# Patient Record
Sex: MALE | Race: WHITE | NOT HISPANIC OR LATINO | Employment: OTHER | URBAN - METROPOLITAN AREA
[De-identification: names, ages, dates, MRNs, and addresses within clinical notes are randomized per-mention and may not be internally consistent; named-entity substitution may affect disease eponyms.]

---

## 2017-02-03 DIAGNOSIS — B20 HUMAN IMMUNODEFICIENCY VIRUS (HIV) DISEASE (H): ICD-10-CM

## 2017-02-03 LAB
ALBUMIN SERPL-MCNC: 3.9 G/DL (ref 3.4–5)
ALP SERPL-CCNC: 77 U/L (ref 40–150)
ALT SERPL W P-5'-P-CCNC: 24 U/L (ref 0–70)
ANION GAP SERPL CALCULATED.3IONS-SCNC: 9 MMOL/L (ref 3–14)
AST SERPL W P-5'-P-CCNC: 25 U/L (ref 0–45)
BASOPHILS # BLD AUTO: 0 10E9/L (ref 0–0.2)
BASOPHILS NFR BLD AUTO: 0.3 %
BILIRUB SERPL-MCNC: 0.7 MG/DL (ref 0.2–1.3)
BUN SERPL-MCNC: 10 MG/DL (ref 7–30)
CALCIUM SERPL-MCNC: 8.5 MG/DL (ref 8.5–10.1)
CD3 CELLS # BLD: 1789 CELLS/UL (ref 603–2990)
CD3 CELLS NFR BLD: 73 % (ref 49–84)
CD3+CD4+ CELLS # BLD: 801 CELLS/UL (ref 441–2156)
CD3+CD4+ CELLS NFR BLD: 33 % (ref 28–63)
CD3+CD4+ CELLS/CD3+CD8+ CLL BLD: 0.85 % (ref 1.4–2.6)
CD3+CD8+ CELLS # BLD: 956 CELLS/UL (ref 125–1312)
CD3+CD8+ CELLS NFR BLD: 39 % (ref 10–40)
CHLORIDE SERPL-SCNC: 100 MMOL/L (ref 94–109)
CHOLEST SERPL-MCNC: 122 MG/DL
CO2 SERPL-SCNC: 28 MMOL/L (ref 20–32)
CREAT SERPL-MCNC: 0.79 MG/DL (ref 0.66–1.25)
DIFFERENTIAL METHOD BLD: NORMAL
EOSINOPHIL # BLD AUTO: 0.1 10E9/L (ref 0–0.7)
EOSINOPHIL NFR BLD AUTO: 0.9 %
ERYTHROCYTE [DISTWIDTH] IN BLOOD BY AUTOMATED COUNT: 12 % (ref 10–15)
GFR SERPL CREATININE-BSD FRML MDRD: NORMAL ML/MIN/1.7M2
GLUCOSE SERPL-MCNC: 85 MG/DL (ref 70–99)
HCT VFR BLD AUTO: 48.1 % (ref 40–53)
HDLC SERPL-MCNC: 52 MG/DL
HGB BLD-MCNC: 17 G/DL (ref 13.3–17.7)
IFC SPECIMEN: ABNORMAL
IMM GRANULOCYTES # BLD: 0 10E9/L (ref 0–0.4)
IMM GRANULOCYTES NFR BLD: 0.2 %
IMMUNODEFICIENCY MARKERS SPEC-IMP: ABNORMAL
LDLC SERPL CALC-MCNC: 59 MG/DL
LYMPHOCYTES # BLD AUTO: 2.2 10E9/L (ref 0.8–5.3)
LYMPHOCYTES NFR BLD AUTO: 32.9 %
MCH RBC QN AUTO: 32.9 PG (ref 26.5–33)
MCHC RBC AUTO-ENTMCNC: 35.3 G/DL (ref 31.5–36.5)
MCV RBC AUTO: 93 FL (ref 78–100)
MONOCYTES # BLD AUTO: 0.5 10E9/L (ref 0–1.3)
MONOCYTES NFR BLD AUTO: 8 %
NEUTROPHILS # BLD AUTO: 3.8 10E9/L (ref 1.6–8.3)
NEUTROPHILS NFR BLD AUTO: 57.7 %
NONHDLC SERPL-MCNC: 70 MG/DL
NRBC # BLD AUTO: 0 10*3/UL
NRBC BLD AUTO-RTO: 0 /100
PLATELET # BLD AUTO: 271 10E9/L (ref 150–450)
POTASSIUM SERPL-SCNC: 4 MMOL/L (ref 3.4–5.3)
PROT SERPL-MCNC: 6.9 G/DL (ref 6.8–8.8)
RBC # BLD AUTO: 5.16 10E12/L (ref 4.4–5.9)
SODIUM SERPL-SCNC: 137 MMOL/L (ref 133–144)
TRIGL SERPL-MCNC: 52 MG/DL
WBC # BLD AUTO: 6.6 10E9/L (ref 4–11)

## 2017-02-04 LAB
HIV1 RNA # PLAS NAA DL=20: 30 {COPIES}/ML
HIV1 RNA SERPL NAA+PROBE-LOG#: 1.5 {LOG_COPIES}/ML

## 2017-02-16 ENCOUNTER — TELEPHONE (OUTPATIENT)
Dept: INFECTIOUS DISEASES | Facility: CLINIC | Age: 67
End: 2017-02-16

## 2017-02-17 ENCOUNTER — OFFICE VISIT (OUTPATIENT)
Dept: INFECTIOUS DISEASES | Facility: CLINIC | Age: 67
End: 2017-02-17
Attending: INTERNAL MEDICINE
Payer: COMMERCIAL

## 2017-02-17 VITALS
HEART RATE: 58 BPM | SYSTOLIC BLOOD PRESSURE: 181 MMHG | DIASTOLIC BLOOD PRESSURE: 97 MMHG | WEIGHT: 199.8 LBS | BODY MASS INDEX: 28.6 KG/M2 | HEIGHT: 70 IN | TEMPERATURE: 97.5 F

## 2017-02-17 DIAGNOSIS — B20 HUMAN IMMUNODEFICIENCY VIRUS (HIV) DISEASE (H): Primary | ICD-10-CM

## 2017-02-17 PROCEDURE — 99212 OFFICE O/P EST SF 10 MIN: CPT | Mod: ZF

## 2017-02-17 PROCEDURE — 36415 COLL VENOUS BLD VENIPUNCTURE: CPT | Performed by: INTERNAL MEDICINE

## 2017-02-17 PROCEDURE — 87536 HIV-1 QUANT&REVRSE TRNSCRPJ: CPT | Performed by: INTERNAL MEDICINE

## 2017-02-17 ASSESSMENT — PAIN SCALES - GENERAL: PAINLEVEL: NO PAIN (0)

## 2017-02-17 NOTE — PROGRESS NOTES
Infectious Disease Clinic  HIV Follow Up Visit    Chief Complaint:  RECHECK (F/U B20)    HPI:  Zac Rosenthal is a 66 year old male who is here for follow-up on HIV treatment.  He is currently taking Stribild once a day without missing doses and no side effects.  Labs were draw 2 weeks prior and viral load was 30 copies/mL and CD4 count was 801.  Patient was concerned about viral load result as he had been consistently undetectable previously.  Reassured patient that this result was probably a blip.     [facility].  Most recently, he had a high resolution anoscopy in August and pathology revealed low grade dysplasia AIN1.  He is due for a followup this month.       ROS:   Eyes: negative, visual blurring, double vision  Ears/Nose/Throat: negative, hearing loss, vertigo  Respiratory: No shortness of breath, dyspnea on exertion, cough, or hemoptysis  Cardiovascular: negative, tachycardia, irregular heart beat, chest pain and exertional chest pain or pressure  Gastrointestinal: negative, poor appetite, nausea, vomiting, abdominal pain, constipation and diarrhea  Genitourinary: negative, frequency, urgency and hematuria  Musculoskeletal: negative, joint pain and joint swelling  Neurologic: negative, headaches, syncope, numbness or tingling of hands and numbness or tingling of feet  Hematologic/Lymphatic/Immunologic: negative, chills and fever      Medications:  Current Outpatient Prescriptions   Medication Sig Dispense Refill     STRIBILD [id number] mg tablet Take 1 tablet by mouth daily (with breakfast) Dispense only in original container. 31 tablet 11     fluconazole (DIFLUCAN) 200 MG tablet Take 1 tablet (200 mg) by mouth daily 14 tablet 11     ketoconazole (NIZORAL) 2 % cream Apply topically daily 15 g 11       Exam:  B/P: 181/97, T: 97.5, P: 58, R: Data Unavailable, Weight:   Wt Readings from Last 2 Encounters:   02/17/17 90.6 kg  (199 lb 12.8 oz)   08/18/16 89.2 kg (196 lb 11.2 oz)     Later remeasured in the exam room to be 145/75.     Physical Exam   Constitutional: He is well-developed, well-nourished, and in no distress.   Cardiovascular: Normal rate and regular rhythm.    Pulmonary/Chest: Breath sounds normal.   Abdominal: Soft. Bowel sounds are normal.   Lymphadenopathy:     He has no cervical adenopathy.   Neurological: He is alert.   Psychiatric: Affect and judgment normal.         Assessment and Plan:  Zac Rosenthal is a 66 year old male who is here for followup on HIV treatment, currently doing well on Stribild once a day.  Will redo viral load today to assure patient's concerns about last count of 30.  If undetectable, will followup in 6 months.  If roughly the same, will see patient in 3 months.  If above 100, will repeat again and schedule appt immediately to discuss change in medication regimen.        Attending:    This note as edited by me reflects my history, physical, assessment and plan.    Victor Hugo Dacosta MD  Professor of Medicine

## 2017-02-17 NOTE — MR AVS SNAPSHOT
After Visit Summary   2/17/2017    Zac Rosenthal    MRN: [id number]           Patient Information     Date Of Birth          1950        Visit Information        Provider Department      2/17/2017 8:30 AM Victor Hugo Dacosta MD [facility] and Infectious Diseases        Today's Diagnoses     Human immunodeficiency virus (HIV) disease (H)    -  1       Follow-ups after your visit        Follow-up notes from your care team     Return in about 6 months (around 8/17/2017).      Your next 10 appointments already scheduled     Aug 18, 2017  8:30 AM CDT   (Arrive by 8:15 AM)   Return Visit with Victor Hugo Dacosta MD   [facility] and Infectious Diseases ([facility])    [address]  3rd Floor  [place] [address] [phone]              Who to contact     If you have questions or need follow up information about today's clinic visit or your schedule please contact [facility] AND INFECTIOUS DISEASES directly at [phone].  Normal or non-critical lab and imaging results will be communicated to you by [employer], letter or phone within 4 business days after the clinic has received the results. If you do not hear from us within 7 days, please contact the clinic through [employer] or phone. If you have a critical or abnormal lab result, we will notify you by phone as soon as possible.  Submit refill requests through [employer] or call your pharmacy and they will forward the refill request to us. Please allow 3 business days for your refill to be completed.          Additional Information About Your Visit        [employer] Information     [employer] gives you secure access to your electronic health record. If you see a primary care provider, you can also send messages to your care team and make appointments. If you have questions, please call your primary care clinic.  If you do not have a primary care provider, please call  "[phone] and they will assist you.        Care EveryWhere ID     This is your Care EveryWhere ID. This could be used by other organizations to access your [place] medical records  [id number]        Your Vitals Were     Pulse Temperature Height BMI (Body Mass Index)          58 97.5  F (36.4  C) (Oral) 1.778 m (5' 10\") 28.67 kg/m2         Blood Pressure from Last 3 Encounters:   02/17/17 (!) 181/97   08/18/16 143/87   08/12/16 155/84    Weight from Last 3 Encounters:   02/17/17 90.6 kg (199 lb 12.8 oz)   08/18/16 89.2 kg (196 lb 11.2 oz)   08/12/16 88.7 kg (195 lb 9.6 oz)              We Performed the Following     HIV-1 RNA quantitative        Primary Care Provider    None Doctor, MD       No address on file        Thank you!     Thank you for choosing [facility] AND INFECTIOUS DISEASES  for your care. Our goal is always to provide you with excellent care. Hearing back from our patients is one way we can continue to improve our services. Please take a few minutes to complete the written survey that you may receive in the mail after your visit with us. Thank you!             Your Updated Medication List - Protect others around you: Learn how to safely use, store and throw away your medicines at www.disposemymeds.org.          This list is accurate as of: 2/17/17 11:59 PM.  Always use your most recent med list.                   Brand Name Dispense Instructions for use    Gupjbyi-Rylnc-Imjrilpd-TenofDF [id number] MG per tablet    STRIBILD    31 tablet    Take 1 tablet by mouth daily (with breakfast) Dispense only in original container.       fluconazole 200 MG tablet    DIFLUCAN    14 tablet    Take 1 tablet (200 mg) by mouth daily       ketoconazole 2 % cream    NIZORAL    15 g    Apply topically daily         "

## 2017-02-17 NOTE — PROGRESS NOTES
Answers for HPI/ROS submitted by the patient on 2/6/2017   General Symptoms: No  Skin Symptoms: No  HENT Symptoms: No  EYE SYMPTOMS: No  HEART SYMPTOMS: No  LUNG SYMPTOMS: No  INTESTINAL SYMPTOMS: No  URINARY SYMPTOMS: No  REPRODUCTIVE SYMPTOMS: No  SKELETAL SYMPTOMS: No  BLOOD SYMPTOMS: No  NERVOUS SYSTEM SYMPTOMS: No  MENTAL HEALTH SYMPTOMS: No

## 2017-02-17 NOTE — LETTER
2/17/2017       RE: Zac Rosenthal  [address]   [place] [facility] [address]     Dear Colleague,    Thank you for referring your patient, Zac Rosenthal, to the [facility] AND INFECTIOUS DISEASES at [facility]. Please see a copy of my visit note below.    Infectious Disease Clinic  HIV Follow Up Visit    Chief Complaint:  RECHECK (F/U B20)    HPI:  Zac Rosenthal is a 66 year old male who is here for follow-up on HIV treatment.  He is currently taking Stribild once a day without missing doses and no side effects.  Labs were draw 2 weeks prior and viral load was 30 copies/mL and CD4 count was 801.  Patient was concerned about viral load result as he had been consistently undetectable previously.  Reassured patient that this result was probably a blip.     [facility] is [facility].  Most recently, he had a high resolution anoscopy in August and pathology revealed low grade dysplasia AIN1.  He is due for a followup this month.       ROS:   Eyes: negative, visual blurring, double vision  Ears/Nose/Throat: negative, hearing loss, vertigo  Respiratory: No shortness of breath, dyspnea on exertion, cough, or hemoptysis  Cardiovascular: negative, tachycardia, irregular heart beat, chest pain and exertional chest pain or pressure  Gastrointestinal: negative, poor appetite, nausea, vomiting, abdominal pain, constipation and diarrhea  Genitourinary: negative, frequency, urgency and hematuria  Musculoskeletal: negative, joint pain and joint swelling  Neurologic: negative, headaches, syncope, numbness or tingling of hands and numbness or tingling of feet  Hematologic/Lymphatic/Immunologic: negative, chills and fever      Medications:  Current Outpatient Prescriptions   Medication Sig Dispense Refill     STRIBILD [id number] mg tablet Take 1 tablet by mouth daily (with breakfast) Dispense only in  original container. 31 tablet 11     fluconazole (DIFLUCAN) 200 MG tablet Take 1 tablet (200 mg) by mouth daily 14 tablet 11     ketoconazole (NIZORAL) 2 % cream Apply topically daily 15 g 11       Exam:  B/P: 181/97, T: 97.5, P: 58, R: Data Unavailable, Weight:   Wt Readings from Last 2 Encounters:   02/17/17 90.6 kg (199 lb 12.8 oz)   08/18/16 89.2 kg (196 lb 11.2 oz)     Later remeasured in the exam room to be 145/75.     Physical Exam   Constitutional: He is well-developed, well-nourished, and in no distress.   Cardiovascular: Normal rate and regular rhythm.    Pulmonary/Chest: Breath sounds normal.   Abdominal: Soft. Bowel sounds are normal.   Lymphadenopathy:     He has no cervical adenopathy.   Neurological: He is alert.   Psychiatric: Affect and judgment normal.         Assessment and Plan:  Zac Rosenthal is a 66 year old male who is here for followup on HIV treatment, currently doing well on Stribild once a day.  Will redo viral load today to assure patient's concerns about last count of 30.  If undetectable, will followup in 6 months.  If roughly the same, will see patient in 3 months.  If above 100, will repeat again and schedule appt immediately to discuss change in medication regimen.        Attending:    This note as edited by me reflects my history, physical, assessment and plan.    Victor Hugo Dacosta MD  Professor of Medicine      Answers for HPI/ROS submitted by the patient on 2/6/2017   General Symptoms: No  Skin Symptoms: No  HENT Symptoms: No  EYE SYMPTOMS: No  HEART SYMPTOMS: No  LUNG SYMPTOMS: No  INTESTINAL SYMPTOMS: No  URINARY SYMPTOMS: No  REPRODUCTIVE SYMPTOMS: No  SKELETAL SYMPTOMS: No  BLOOD SYMPTOMS: No  NERVOUS SYSTEM SYMPTOMS: No  MENTAL HEALTH SYMPTOMS: No      Again, thank you for allowing me to participate in the care of your patient.      Sincerely,    Victor Hugo Dacosta MD

## 2017-02-17 NOTE — LETTER
Date:February 20, 2017      Patient was self referred, no letter generated. Do not send.        [facility] Health Information

## 2017-02-17 NOTE — NURSING NOTE
"Chief Complaint   Patient presents with     RECHECK     F/U B20       Initial BP (!) 181/97 (Cuff Size: Adult Regular)  Pulse 58  Temp 97.5  F (36.4  C) (Oral)  Ht 1.778 m (5' 10\")  Wt 90.6 kg (199 lb 12.8 oz)  BMI 28.67 kg/m2 Estimated body mass index is 28.67 kg/(m^2) as calculated from the following:    Height as of this encounter: 1.778 m (5' 10\").    Weight as of this encounter: 90.6 kg (199 lb 12.8 oz).  Medication Reconciliation: complete  "

## 2017-02-21 LAB
HIV1 RNA # PLAS NAA DL=20: NORMAL {COPIES}/ML
HIV1 RNA SERPL NAA+PROBE-LOG#: NORMAL {LOG_COPIES}/ML

## 2017-02-24 ENCOUNTER — TELEPHONE (OUTPATIENT)
Dept: INFECTIOUS DISEASES | Facility: CLINIC | Age: 67
End: 2017-02-24

## 2017-02-24 NOTE — TELEPHONE ENCOUNTER
Dr. Dacosta requested that nurse call pt and let him know HIV viral load from 2/17/17 was undetectable. Spoke with pt on the phone and notified him.  Lisa Kelly RN

## 2017-04-13 ENCOUNTER — OFFICE VISIT (OUTPATIENT)
Dept: SURGERY | Facility: CLINIC | Age: 67
End: 2017-04-13

## 2017-04-13 VITALS
HEART RATE: 59 BPM | OXYGEN SATURATION: 100 % | WEIGHT: 206 LBS | HEIGHT: 70 IN | SYSTOLIC BLOOD PRESSURE: 147 MMHG | TEMPERATURE: 98.4 F | DIASTOLIC BLOOD PRESSURE: 84 MMHG | BODY MASS INDEX: 29.49 KG/M2

## 2017-04-13 DIAGNOSIS — K62.82 ANAL DYSPLASIA: Primary | ICD-10-CM

## 2017-04-13 ASSESSMENT — PAIN SCALES - GENERAL: PAINLEVEL: NO PAIN (0)

## 2017-04-13 NOTE — NURSING NOTE
"Chief Complaint   Patient presents with     Clinic Care Coordination - Follow-up     HRA procedure today.        Vitals:    04/13/17 1246   BP: 147/84   BP Location: Left arm   Patient Position: Chair   Cuff Size: Adult Regular   Pulse: 59   Temp: 98.4  F (36.9  C)   TempSrc: Oral   SpO2: 100%   Weight: 206 lb   Height: 5' 10\"       Body mass index is 29.56 kg/(m^2).    Sarai MITCHELL LPN                       "

## 2017-04-13 NOTE — LETTER
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 He was last seen in our clinic for HRA on 2017 with biopsies at that time showing low grade dysplasia. Anal cytology on 16 was negative. He presents today for high resolution anoscopy.     HPI: Zac Rosenthal is not a smoker. His HIV is under good control. He has no anorectal complaints and no other concerns today.   His last colonoscopy was in  with a repeat recommended this year.    Answers for HPI/ROS submitted by the patient on 2016   General Symptoms: No  Skin Symptoms: No  HENT Symptoms: No  EYE SYMPTOMS: No  HEART SYMPTOMS: No  LUNG SYMPTOMS: No  INTESTINAL SYMPTOMS: No  URINARY SYMPTOMS: No  REPRODUCTIVE SYMPTOMS: No  SKELETAL SYMPTOMS: No  BLOOD SYMPTOMS: No  NERVOUS SYSTEM SYMPTOMS: No  MENTAL HEALTH SYMPTOMS: No      ASSESSMENT: Written, informed consent was obtained prior to procedure.  Prior to the start of the procedure and with procedural staff participation, I verbally confirmed the patient s identity using two indicators, relevant allergies, that the procedure was appropriate and matched the consent or emergent situation, and that the correct equipment/implants were available. Immediately prior to starting the procedure I conducted the Time Out with the procedural staff and re-confirmed the patient s name, procedure, and site/side. (The Joint Commission universal protocol was followed.)  Yes    Sedation (Moderate or Deep): None    Anal cytology was not obtained today. Digital anal rectal exam was performed without any palpable lesions. Dilute  acetic acid soak was completed for 2 minutes. Lubricant was used to insert the anoscope. Performed high resolution microscopy using the colposcope. The dentate line was viewed in its entirety. Abnormal areas noted were a distinct area of the bright acetowhitening with punctation in the left anterior position at the dentate line and a larger area in the posterior anal canal at the anal verge extending to the perianal skin. No verruciform lesions. After injection with 1% lidocaine with epinephrine biopsies were obtained the left anterior position at the dentate line and in the posterior position in the distal anal canal using a baby Tischler forceps. We discussed that if this represents high grade dysplasia that I would recommend fulguration in the OR. The patient requested to have fulguration in clinic today rather than having to return. Both sites were fulgurated, the base was curetted out, and re fulguration was performed. He tolerated this well. Hemostasis was obtained using Monsel solution at the internal site as this was overlying a hemorrhoid. He tolerated this well.  The perianal area was inspected after acetic acid soak. No additional lesions, acetowhitening, or abnormalities noted.    PLAN: We will follow-up with him with results of the biopsies from today. If this is high-grade dysplasia, it was destroyed completely today and I would recommend follow up for HRA in clinic in 3 months. If low grade dysplasia or normal, repeat HRA in 6 months.   Patient's questions were answered to his stated satisfaction and he is in agreement with this plan.    For details of past medical history, surgical history, family history, medications, allergies, and review of systems, please see details below.    Medical history:  No past medical history on file.    Surgical history:  No past surgical history on file.    Family history:  No family history on file.    Medications:  Current Outpatient Prescriptions   Medication Sig  "Dispense Refill     STRIBILD [id number] mg tablet Take 1 tablet by mouth daily (with breakfast) Dispense only in original container. 31 tablet 11     fluconazole (DIFLUCAN) 200 MG tablet Take 1 tablet (200 mg) by mouth daily 14 tablet 11     ketoconazole (NIZORAL) 2 % cream Apply topically daily 15 g 11     Allergies:  The patientis allergic to amoxicillin.    Social history:  Social History   Substance Use Topics     Smoking status: Never Smoker     Smokeless tobacco: Not on file     Alcohol use Not on file     Marital status: single.    Review of Systems:  Nursing Notes:   Anaya Burgos LPN  4/13/2017 12:51 PM  Signed  Chief Complaint   Patient presents with     Clinic Care Coordination - Follow-up     HRA procedure today.        Vitals:    04/13/17 1246   BP: 147/84   BP Location: Left arm   Patient Position: Chair   Cuff Size: Adult Regular   Pulse: 59   Temp: 98.4  F (36.9  C)   TempSrc: Oral   SpO2: 100%   Weight: 206 lb   Height: 5' 10\"       Body mass index is 29.56 kg/(m^2).    Sarai MITCHELL LPN                          This procedure was performed under a collaborative agreement with Dr. Mikael Prabhakar MD, Chief of Colon and Rectal Surgery, [facility].    Rosangela Valentin, NP-C  Colon and Rectal Surgery  [facility]    Rosangela Valentin, APRN CNP      "

## 2017-04-13 NOTE — MR AVS SNAPSHOT
After Visit Summary   4/13/2017    Zac Rosenthal    MRN: [id number]           Patient Information     Date Of Birth          1950        Visit Information        Provider Department      4/13/2017 1:00 PM Rosangela Pritchett APRN [facility] Colon and Rectal Surgery        Today's Diagnoses     Anal dysplasia    -  1       Follow-ups after your visit        Your next 10 appointments already scheduled     Aug 18, 2017  8:30 AM CDT   (Arrive by 8:15 AM)   Return Visit with Victor Hugo Dacosta MD   [facility] and Infectious Diseases ([facility])    [address] [place] [address] [phone]              Who to contact     Please call your clinic at [phone] to:    Ask questions about your health    Make or cancel appointments    Discuss your medicines    Learn about your test results    Speak to your doctor   If you have compliments or concerns about an experience at your clinic, or if you wish to file a complaint, please contact [facility] Patient Relations at [phone] or email us at [email or web address]         Additional Information About Your Visit        MyChart Information     [employer] gives you secure access to your electronic health record. If you see a primary care provider, you can also send messages to your care team and make appointments. If you have questions, please call your primary care clinic.  If you do not have a primary care provider, please call [phone] and they will assist you.      [employer] is an electronic gateway that provides easy, online access to your medical records. With [employer], you can request a clinic appointment, read your test results, renew a prescription or communicate with your care team.     To access your existing account, please contact your [facility] or call [phone] for assistance.       "  Care EveryWhere ID     This is your Care EveryWhere ID. This could be used by other organizations to access your [place] medical records  [id number]        Your Vitals Were     Pulse Temperature Height Pulse Oximetry BMI (Body Mass Index)       59 98.4  F (36.9  C) (Oral) 5' 10\" 100% 29.56 kg/m2        Blood Pressure from Last 3 Encounters:   04/13/17 147/84   02/17/17 (!) 181/97   08/18/16 143/87    Weight from Last 3 Encounters:   04/13/17 206 lb   02/17/17 199 lb 12.8 oz   08/18/16 196 lb 11.2 oz              We Performed the Following     HC ANOSCOPY DX, HRA W/ BIOPSY(IES)     Surgical pathology exam        Primary Care Provider    None Doctor, MD       No address on file        Thank you!     Thank you for choosing [facility] COLON AND RECTAL SURGERY  for your care. Our goal is always to provide you with excellent care. Hearing back from our patients is one way we can continue to improve our services. Please take a few minutes to complete the written survey that you may receive in the mail after your visit with us. Thank you!             Your Updated Medication List - Protect others around you: Learn how to safely use, store and throw away your medicines at www.disposemymeds.org.          This list is accurate as of: 4/13/17  2:05 PM.  Always use your most recent med list.                   Brand Name Dispense Instructions for use    Btxjjzg-Ntiyt-Cadafiab-TenofDF [id number] MG per tablet    STRIBILD    31 tablet    Take 1 tablet by mouth daily (with breakfast) Dispense only in original container.       fluconazole 200 MG tablet    DIFLUCAN    14 tablet    Take 1 tablet (200 mg) by mouth daily       ketoconazole 2 % cream    NIZORAL    15 g    Apply topically daily         "

## 2017-04-13 NOTE — PROGRESS NOTES
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 He was last seen in our clinic for HRA on 2017 with biopsies at that time showing low grade dysplasia. Anal cytology on 16 was negative. He presents today for high resolution anoscopy.     HPI: Zac Rosenthal is not a smoker. His HIV is under good control. He has no anorectal complaints and no other concerns today.   His last colonoscopy was in  with a repeat recommended this year.    Answers for HPI/ROS submitted by the patient on 2016   General Symptoms: No  Skin Symptoms: No  HENT Symptoms: No  EYE SYMPTOMS: No  HEART SYMPTOMS: No  LUNG SYMPTOMS: No  INTESTINAL SYMPTOMS: No  URINARY SYMPTOMS: No  REPRODUCTIVE SYMPTOMS: No  SKELETAL SYMPTOMS: No  BLOOD SYMPTOMS: No  NERVOUS SYSTEM SYMPTOMS: No  MENTAL HEALTH SYMPTOMS: No      ASSESSMENT: Written, informed consent was obtained prior to procedure.  Prior to the start of the procedure and with procedural staff participation, I verbally confirmed the patient s identity using two indicators, relevant allergies, that the procedure was appropriate and matched the consent or emergent situation, and that the correct equipment/implants were available. Immediately prior to starting the procedure I conducted the Time Out with the procedural staff and re-confirmed the patient s name, procedure, and site/side. (The Joint Commission universal protocol was followed.)  Yes    Sedation (Moderate or Deep): None    Anal cytology was not obtained today. Digital anal rectal exam was performed without any palpable lesions. Dilute acetic acid soak was completed for 2 minutes. Lubricant was used to insert the anoscope.  Performed high resolution microscopy using the colposcope. The dentate line was viewed in its entirety. Abnormal areas noted were a distinct area of the bright acetowhitening with punctation in the left anterior position at the dentate line and a larger area in the posterior anal canal at the anal verge extending to the perianal skin. No verruciform lesions. After injection with 1% lidocaine with epinephrine biopsies were obtained the left anterior position at the dentate line and in the posterior position in the distal anal canal using a baby Tischler forceps. We discussed that if this represents high grade dysplasia that I would recommend fulguration in the OR. The patient requested to have fulguration in clinic today rather than having to return. Both sites were fulgurated, the base was curetted out, and re fulguration was performed. He tolerated this well. Hemostasis was obtained using Monsel solution at the internal site as this was overlying a hemorrhoid. He tolerated this well.  The perianal area was inspected after acetic acid soak. No additional lesions, acetowhitening, or abnormalities noted.    PLAN: We will follow-up with him with results of the biopsies from today. If this is high-grade dysplasia, it was destroyed completely today and I would recommend follow up for HRA in clinic in 3 months. If low grade dysplasia or normal, repeat HRA in 6 months.   Due for colonoscopy this year and we will help him set this up.  Patient's questions were answered to his stated satisfaction and he is in agreement with this plan.    For details of past medical history, surgical history, family history, medications, allergies, and review of systems, please see details below.    Medical history:  No past medical history on file.    Surgical history:  No past surgical history on file.    Family history:  No family history on file.    Medications:  Current Outpatient Prescriptions   Medication Sig Dispense Refill      "STRIBILD [id number] mg tablet Take 1 tablet by mouth daily (with breakfast) Dispense only in original container. 31 tablet 11     fluconazole (DIFLUCAN) 200 MG tablet Take 1 tablet (200 mg) by mouth daily 14 tablet 11     ketoconazole (NIZORAL) 2 % cream Apply topically daily 15 g 11     Allergies:  The patientis allergic to amoxicillin.    Social history:  Social History   Substance Use Topics     Smoking status: Never Smoker     Smokeless tobacco: Not on file     Alcohol use Not on file     Marital status: single.    Review of Systems:  Nursing Notes:   Anaya Burgos LPN  4/13/2017 12:51 PM  Signed  Chief Complaint   Patient presents with     Clinic Care Coordination - Follow-up     HRA procedure today.        Vitals:    04/13/17 1246   BP: 147/84   BP Location: Left arm   Patient Position: Chair   Cuff Size: Adult Regular   Pulse: 59   Temp: 98.4  F (36.9  C)   TempSrc: Oral   SpO2: 100%   Weight: 206 lb   Height: 5' 10\"       Body mass index is 29.56 kg/(m^2).    Sarai MITCHELL LPN                          This procedure was performed under a collaborative agreement with Dr. Mikael Prabhakar MD, Chief of Colon and Rectal Surgery, [facility].    Rosangela Valentin, NP-C  Colon and Rectal Surgery  [facility]  "

## 2017-04-14 LAB — COPATH REPORT: NORMAL

## 2017-04-17 ENCOUNTER — TELEPHONE (OUTPATIENT)
Dept: SURGERY | Facility: CLINIC | Age: 67
End: 2017-04-17

## 2017-04-17 NOTE — TELEPHONE ENCOUNTER
Called to discuss pathology showing low grade dysplasia. Recommend repeat HRA in 6 months. Due for a colonoscopy also which he would like to have us set up.  Encouraged the patient to contact the clinic in the meantime with any questions or concerns.    JULY Marquez, NP-C  Colon and Rectal Surgery  [facility]

## 2017-05-17 ENCOUNTER — TELEPHONE (OUTPATIENT)
Dept: GASTROENTEROLOGY | Facility: OUTPATIENT CENTER | Age: 67
End: 2017-05-17

## 2017-05-17 NOTE — TELEPHONE ENCOUNTER
Patient taking any blood thinners ? no    Heart disease ? denies    Lung disease ? denies      Sleep apnea ? denies    Diabetic ? denies    Kidney disease ? denies    Dialysis ? n/a    Electronic implanted medical devices ? denies    Are you taking any narcotic pain medication ? no  What is your daily dosage ?    PTSD ? n/a    Prep instructions reviewed with patient ? Instructions,  policy, conscious sedation plan reviewed. Advised pt. To have someone stay with him post exam    Pharmacy : n/a    Indication for procedure : Screening colonoscopy    Referring provider : Rosangela Pritchett

## 2017-05-25 ENCOUNTER — DOCUMENTATION ONLY (OUTPATIENT)
Dept: GASTROENTEROLOGY | Facility: OUTPATIENT CENTER | Age: 67
End: 2017-05-25

## 2017-05-25 ENCOUNTER — TRANSFERRED RECORDS (OUTPATIENT)
Dept: HEALTH INFORMATION MANAGEMENT | Facility: CLINIC | Age: 67
End: 2017-05-25

## 2017-05-30 LAB — COPATH REPORT: NORMAL

## 2017-08-18 ENCOUNTER — OFFICE VISIT (OUTPATIENT)
Dept: INFECTIOUS DISEASES | Facility: CLINIC | Age: 67
End: 2017-08-18
Attending: INTERNAL MEDICINE
Payer: COMMERCIAL

## 2017-08-18 VITALS
BODY MASS INDEX: 28.95 KG/M2 | HEIGHT: 70 IN | TEMPERATURE: 98.1 F | HEART RATE: 59 BPM | OXYGEN SATURATION: 98 % | DIASTOLIC BLOOD PRESSURE: 88 MMHG | WEIGHT: 202.2 LBS | SYSTOLIC BLOOD PRESSURE: 165 MMHG

## 2017-08-18 DIAGNOSIS — B20 HUMAN IMMUNODEFICIENCY VIRUS (HIV) DISEASE (H): Primary | ICD-10-CM

## 2017-08-18 DIAGNOSIS — Z00.00 ROUTINE HEALTH MAINTENANCE: ICD-10-CM

## 2017-08-18 LAB
ALBUMIN SERPL-MCNC: 3.8 G/DL (ref 3.4–5)
ALP SERPL-CCNC: 92 U/L (ref 40–150)
ALT SERPL W P-5'-P-CCNC: 23 U/L (ref 0–70)
ANION GAP SERPL CALCULATED.3IONS-SCNC: 8 MMOL/L (ref 3–14)
AST SERPL W P-5'-P-CCNC: 21 U/L (ref 0–45)
BASOPHILS # BLD AUTO: 0 10E9/L (ref 0–0.2)
BASOPHILS NFR BLD AUTO: 0.5 %
BILIRUB SERPL-MCNC: 0.7 MG/DL (ref 0.2–1.3)
BUN SERPL-MCNC: 13 MG/DL (ref 7–30)
CALCIUM SERPL-MCNC: 8.9 MG/DL (ref 8.5–10.1)
CD3 CELLS # BLD: 1319 CELLS/UL (ref 603–2990)
CD3 CELLS NFR BLD: 70 % (ref 49–84)
CD3+CD4+ CELLS # BLD: 591 CELLS/UL (ref 441–2156)
CD3+CD4+ CELLS NFR BLD: 52 % (ref 28–63)
CD3+CD4+ CELLS/CD3+CD8+ CLL BLD: 1.41 % (ref 1.4–2.6)
CD3+CD8+ CELLS # BLD: 700 CELLS/UL (ref 125–1312)
CD3+CD8+ CELLS NFR BLD: 37 % (ref 10–40)
CHLORIDE SERPL-SCNC: 101 MMOL/L (ref 94–109)
CO2 SERPL-SCNC: 27 MMOL/L (ref 20–32)
CREAT SERPL-MCNC: 0.74 MG/DL (ref 0.66–1.25)
DIFFERENTIAL METHOD BLD: NORMAL
EOSINOPHIL # BLD AUTO: 0.1 10E9/L (ref 0–0.7)
EOSINOPHIL NFR BLD AUTO: 1.1 %
ERYTHROCYTE [DISTWIDTH] IN BLOOD BY AUTOMATED COUNT: 12.1 % (ref 10–15)
GFR SERPL CREATININE-BSD FRML MDRD: >90 ML/MIN/1.7M2
GLUCOSE SERPL-MCNC: 91 MG/DL (ref 70–99)
HCT VFR BLD AUTO: 48.7 % (ref 40–53)
HGB BLD-MCNC: 17.2 G/DL (ref 13.3–17.7)
IFC SPECIMEN: NORMAL
IMM GRANULOCYTES # BLD: 0 10E9/L (ref 0–0.4)
IMM GRANULOCYTES NFR BLD: 0.3 %
LYMPHOCYTES # BLD AUTO: 1.8 10E9/L (ref 0.8–5.3)
LYMPHOCYTES NFR BLD AUTO: 27.3 %
MCH RBC QN AUTO: 32.6 PG (ref 26.5–33)
MCHC RBC AUTO-ENTMCNC: 35.3 G/DL (ref 31.5–36.5)
MCV RBC AUTO: 92 FL (ref 78–100)
MONOCYTES # BLD AUTO: 0.5 10E9/L (ref 0–1.3)
MONOCYTES NFR BLD AUTO: 7.3 %
NEUTROPHILS # BLD AUTO: 4.1 10E9/L (ref 1.6–8.3)
NEUTROPHILS NFR BLD AUTO: 63.5 %
NRBC # BLD AUTO: 0 10*3/UL
NRBC BLD AUTO-RTO: 0 /100
PLATELET # BLD AUTO: 263 10E9/L (ref 150–450)
POTASSIUM SERPL-SCNC: 4.2 MMOL/L (ref 3.4–5.3)
PROT SERPL-MCNC: 7.6 G/DL (ref 6.8–8.8)
RBC # BLD AUTO: 5.27 10E12/L (ref 4.4–5.9)
SODIUM SERPL-SCNC: 136 MMOL/L (ref 133–144)
WBC # BLD AUTO: 6.4 10E9/L (ref 4–11)

## 2017-08-18 PROCEDURE — G0009 ADMIN PNEUMOCOCCAL VACCINE: HCPCS | Mod: ZF

## 2017-08-18 PROCEDURE — 90715 TDAP VACCINE 7 YRS/> IM: CPT | Mod: ZF | Performed by: INTERNAL MEDICINE

## 2017-08-18 PROCEDURE — 90472 IMMUNIZATION ADMIN EACH ADD: CPT | Mod: ZF

## 2017-08-18 PROCEDURE — 86360 T CELL ABSOLUTE COUNT/RATIO: CPT | Performed by: INTERNAL MEDICINE

## 2017-08-18 PROCEDURE — 87536 HIV-1 QUANT&REVRSE TRNSCRPJ: CPT | Performed by: INTERNAL MEDICINE

## 2017-08-18 PROCEDURE — 90670 PCV13 VACCINE IM: CPT | Mod: ZF | Performed by: INTERNAL MEDICINE

## 2017-08-18 PROCEDURE — 80053 COMPREHEN METABOLIC PANEL: CPT | Performed by: INTERNAL MEDICINE

## 2017-08-18 PROCEDURE — 85025 COMPLETE CBC W/AUTO DIFF WBC: CPT | Performed by: INTERNAL MEDICINE

## 2017-08-18 PROCEDURE — 86359 T CELLS TOTAL COUNT: CPT | Performed by: INTERNAL MEDICINE

## 2017-08-18 PROCEDURE — 36415 COLL VENOUS BLD VENIPUNCTURE: CPT | Performed by: INTERNAL MEDICINE

## 2017-08-18 PROCEDURE — 99212 OFFICE O/P EST SF 10 MIN: CPT | Mod: 25,ZF

## 2017-08-18 PROCEDURE — 25000128 H RX IP 250 OP 636: Mod: ZF | Performed by: INTERNAL MEDICINE

## 2017-08-18 RX ADMIN — TETANUS TOXOID, REDUCED DIPHTHERIA TOXOID AND ACELLULAR PERTUSSIS VACCINE, ADSORBED 0.5 ML: 5; 2.5; 8; 8; 2.5 SUSPENSION INTRAMUSCULAR at 10:38

## 2017-08-18 RX ADMIN — PNEUMOCOCCAL 13-VALENT CONJUGATE VACCINE 0.5 ML: 2.2; 2.2; 2.2; 2.2; 2.2; 4.4; 2.2; 2.2; 2.2; 2.2; 2.2; 2.2; 2.2 INJECTION, SUSPENSION INTRAMUSCULAR at 10:37

## 2017-08-18 ASSESSMENT — PAIN SCALES - GENERAL: PAINLEVEL: NO PAIN (0)

## 2017-08-18 NOTE — MR AVS SNAPSHOT
After Visit Summary   8/18/2017    Zac Rosenthal    MRN: [id number]           Patient Information     Date Of Birth          1950        Visit Information        Provider Department      8/18/2017 8:30 AM Victor Hugo Dacosta MD [facility] and Infectious Diseases        Today's Diagnoses     Human immunodeficiency virus (HIV) disease (H)    -  1    Routine health maintenance           Follow-ups after your visit        Follow-up notes from your care team     Return in about 6 months (around 2/18/2018).      Your next 10 appointments already scheduled     Jan 19, 2018  3:00 PM CST   LAB with  LAB   [facility] ([facility])    [address] [place] [address] [id number] [phone]           Patient must bring picture ID. Patient should be prepared to give a urine specimen  Please do not eat 10-12 hours before your appointment if you are coming in fasting for labs on lipids, cholesterol, or glucose (sugar). Pregnant women should follow their Care Team instructions. Water with medications is okay. Do not drink coffee or other fluids. If you have concerns about taking  your medications, please ask at office or if scheduling via GCW, send a message by clicking on Secure Messaging, Message Your Care Team.            Feb 16, 2018  8:30 AM CST   (Arrive by 8:15 AM)   Return Visit with Victor Hugo Dacosta MD   [facility] Infectious Diseases ([facility])    [address] [place] [address] [phone]              Future tests that were ordered for you today     Open Future Orders        Priority Expected Expires Ordered    Comprehensive metabolic panel Routine 1/18/2018 8/18/2018 8/18/2017    CBC with platelets differential Routine 1/18/2018 8/18/2018 8/18/2017    HIV-1 RNA quantitative Routine 1/18/2018 8/18/2018 8/18/2017    T cell subset profile Routine 1/18/2018  "8/18/2018 8/18/2017    Comprehensive metabolic panel Routine  8/18/2018 8/18/2017    CBC with platelets differential Routine  8/18/2018 8/18/2017    HIV-1 RNA quantitative Routine  8/18/2018 8/18/2017    T cell subset profile Routine  8/18/2018 8/18/2017            Who to contact     If you have questions or need follow up information about today's clinic visit or your schedule please contact [facility] AND INFECTIOUS DISEASES directly at [phone].  Normal or non-critical lab and imaging results will be communicated to you by [employer], letter or phone within 4 business days after the clinic has received the results. If you do not hear from us within 7 days, please contact the clinic through theAudience or phone. If you have a critical or abnormal lab result, we will notify you by phone as soon as possible.  Submit refill requests through theAudience or call your pharmacy and they will forward the refill request to us. Please allow 3 business days for your refill to be completed.          Additional Information About Your Visit        [email or web address] Information     theAudience gives you secure access to your electronic health record. If you see a primary care provider, you can also send messages to your care team and make appointments. If you have questions, please call your primary care clinic.  If you do not have a primary care provider, please call [phone] and they will assist you.        Care EveryWhere ID     This is your Care EveryWhere ID. This could be used by other organizations to access your [facility] medical records  [id number]        Your Vitals Were     Pulse Temperature Height Pulse Oximetry BMI (Body Mass Index)       59 98.1  F (36.7  C) (Oral) 1.778 m (5' 10\") 98% 29.01 kg/m2        Blood Pressure from Last 3 Encounters:   08/18/17 165/88   04/13/17 147/84   02/17/17 (!) 181/97    Weight from Last 3 Encounters:   08/18/17 91.7 kg (202 lb 3.2 oz)   04/13/17 93.4 kg (206 lb)   02/17/17 90.6 kg " (199 lb 12.8 oz)              We Performed the Following     CBC with platelets differential     Comprehensive metabolic panel     HIV-1 RNA quantitative     T cell subset profile        Primary Care Provider    None Doctor, MD       No address on file        Equal Access to Services     PRAVEEN MCKAYADOLFO : Leandro florentin matos addi Smith, asad luqroxana, dheeraj kadaniel ny, silverio mims. So [facility] [phone].    ATENCIÓN: Si habla español, tiene a nicole disposición servicios gratuitos de asistencia lingüística. Llame al [phone].    We comply with applicable federal civil rights laws and [place] laws. We do not discriminate on the basis of race, color, national origin, age, disability sex, sexual orientation or gender identity.            Thank you!     Thank you for choosing [facility] AND INFECTIOUS DISEASES  for your care. Our goal is always to provide you with excellent care. Hearing back from our patients is one way we can continue to improve our services. Please take a few minutes to complete the written survey that you may receive in the mail after your visit with us. Thank you!             Your Updated Medication List - Protect others around you: Learn how to safely use, store and throw away your medicines at www.disposemymeds.org.          This list is accurate as of: 8/18/17 11:19 AM.  Always use your most recent med list.                   Brand Name Dispense Instructions for use Diagnosis    Geujtgw-Dusqr-Bdxakcru-TenofDF [id number] MG per tablet    STRIBILD    31 tablet    Take 1 tablet by mouth daily (with breakfast) Dispense only in original container.    Human immunodeficiency virus (HIV) disease (H)       fluconazole 200 MG tablet    DIFLUCAN    14 tablet    Take 1 tablet (200 mg) by mouth daily    Human immunodeficiency virus (HIV) disease (H)       ketoconazole 2 % cream    NIZORAL    15 g    Apply topically daily    Human immunodeficiency virus  (HIV) disease (H)

## 2017-08-18 NOTE — NURSING NOTE
Chief Complaint   Patient presents with     RECHECK     B20   Pt roomed, vitals, meds, and allergies reviewed with pt. Pt ready for provider.  Kamron Lopez, CMA

## 2017-08-18 NOTE — PROGRESS NOTES
Infectious Disease Clinic  HIV Follow Up Visit    Chief Complaint:  RECHECK (B20)    HPI:  Zac Rosenthal is a 66 year old male who is here for follow-up on HIV treatment.  He is currently taking Stribild once a day without missing doses and no side effects.  He feels well and has no concerns. Had an HRA in April that showed AIN1 and plans to have a rpt HRA in 6m. Also had a c scope which showed a tubular adenoma. Interestingly, he was biopsied to rule out microscopic colitis. It appears that he complained of loose stools while getting prepped for the scope and the proceduralist interpreted that as diarrhea and biopsied him.       ROS:   Eyes: negative, visual blurring, double vision  Ears/Nose/Throat: negative, hearing loss, vertigo  Respiratory: No shortness of breath, dyspnea on exertion, cough, or hemoptysis  Cardiovascular: negative, tachycardia, irregular heart beat, chest pain and exertional chest pain or pressure  Gastrointestinal: negative, poor appetite, nausea, vomiting, abdominal pain, constipation and diarrhea  Genitourinary: negative, frequency, urgency and hematuria  Musculoskeletal: negative, joint pain and joint swelling  Neurologic: negative, headaches, syncope, numbness or tingling of hands and numbness or tingling of feet  Hematologic/Lymphatic/Immunologic: negative, chills and fever      Medications:  Current Outpatient Prescriptions   Medication Sig Dispense Refill     STRIBILD [id number] mg tablet Take 1 tablet by mouth daily (with breakfast) Dispense only in original container. 31 tablet 11     fluconazole (DIFLUCAN) 200 MG tablet Take 1 tablet (200 mg) by mouth daily 14 tablet 11     ketoconazole (NIZORAL) 2 % cream Apply topically daily 15 g 11       Exam:  B/P: 181/97, T: 97.5, P: 58, R: Data Unavailable, Weight:   Wt Readings from Last 2 Encounters:   08/18/17 91.7 kg (202 lb 3.2 oz)   04/13/17 93.4 kg (206 lb)     Later remeasured in the exam room to be 145/75.     Physical Exam    Constitutional: He is well-developed, well-nourished, and in no distress.   Cardiovascular: Normal rate and regular rhythm.    Pulmonary/Chest: Breath sounds normal.   Abdominal: Soft. Bowel sounds are normal.   Lymphadenopathy:     He has no cervical adenopathy.   Neurological: He is alert.   Psychiatric: Affect and judgment normal.         Assessment and Plan:  Zac Rosenthal is a 66 year old male who is here for followup on HIV treatment.    #HIV: Well controlled on Stribild. No missed doses. Will re do routine labs today and put in labs for 6 months to be done before his next appt    #HTN: Recorded on multiple occasions however patient states that his pressure his normal at home. Encouraged him to get a PCP who can order a 24 hour ambulatory cuff for him to rule out white coat HTN.     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 Needs rpt HRA in October, 2017.     #Routine care: TdaP and PCV13 ordered today. Rpt c-scope in 5 years    Patient seen and discussed with Dr. Victor Hugo Zamora  Internal Medicine, PGY-3  [phone]      ID Staff:    I saw and examined this patient with Dr. Zamora and discussed with him the assessment and plan.  I agree with the above note.    Victor Hugo Dacosta MD  Professor of Medicine

## 2017-08-18 NOTE — LETTER
8/18/2017      RE: Zac Rosenthal  [address]   [facility] [address]       Infectious Disease Clinic  HIV Follow Up Visit    Chief Complaint:  RECHECK (B20)    HPI:  Zac Rosenthal is a 66 year old male who is here for follow-up on HIV treatment.  He is currently taking Stribild once a day without missing doses and no side effects.  He feels well and has no concerns. Had an HRA in April that showed AIN1 and plans to have a rpt HRA in 6m. Also had a c scope which showed a tubular adenoma. Interestingly, he was biopsied to rule out microscopic colitis. It appears that he complained of loose stools while getting prepped for the scope and the proceduralist interpreted that as diarrhea and biopsied him.       ROS:   Eyes: negative, visual blurring, double vision  Ears/Nose/Throat: negative, hearing loss, vertigo  Respiratory: No shortness of breath, dyspnea on exertion, cough, or hemoptysis  Cardiovascular: negative, tachycardia, irregular heart beat, chest pain and exertional chest pain or pressure  Gastrointestinal: negative, poor appetite, nausea, vomiting, abdominal pain, constipation and diarrhea  Genitourinary: negative, frequency, urgency and hematuria  Musculoskeletal: negative, joint pain and joint swelling  Neurologic: negative, headaches, syncope, numbness or tingling of hands and numbness or tingling of feet  Hematologic/Lymphatic/Immunologic: negative, chills and fever      Medications:  Current Outpatient Prescriptions   Medication Sig Dispense Refill     STRIBILD [phone] mg tablet Take 1 tablet by mouth daily (with breakfast) Dispense only in original container. 31 tablet 11     fluconazole (DIFLUCAN) 200 MG tablet Take 1 tablet (200 mg) by mouth daily 14 tablet 11     ketoconazole (NIZORAL) 2 % cream Apply topically daily 15 g 11       Exam:  B/P: 181/97, T: 97.5, P: 58, R: Data Unavailable, Weight:   Wt Readings from Last 2 Encounters:   08/18/17 91.7 kg (202 lb 3.2 oz)   04/13/17  93.4 kg (206 lb)     Later remeasured in the exam room to be 145/75.     Physical Exam   Constitutional: He is well-developed, well-nourished, and in no distress.   Cardiovascular: Normal rate and regular rhythm.    Pulmonary/Chest: Breath sounds normal.   Abdominal: Soft. Bowel sounds are normal.   Lymphadenopathy:     He has no cervical adenopathy.   Neurological: He is alert.   Psychiatric: Affect and judgment normal.         Assessment and Plan:  Zac Rosenthal is a 66 year old male who is here for followup on HIV treatment.    #HIV: Well controlled on Stribild. No missed doses. Will re do routine labs today and put in labs for 6 months to be done before his next appt    #HTN: Recorded on multiple occasions however patient states that his pressure his normal at home. Encouraged him to get a PCP who can order a 24 hour ambulatory cuff for him to rule out white coat HTN.     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 Needs rpt HRA in October, 2017.     #Routine care: TdaP and PCV13 ordered today. Rpt c-scope in 5 years    Patient seen and discussed with Dr. Victor Hugo Zamora  Internal Medicine, PGY-3  [phone]      ID Staff:    I saw and examined this patient with Dr. Zamora and discussed with him the assessment and plan.  I agree with the above note.    Victor Hugo Dacosta MD  Professor of Medicine    Victor Hugo Dacosta MD

## 2017-08-18 NOTE — LETTER
Date:August 21, 2017      Patient was self referred, no letter generated. Do not send.        [facility] Health Information

## 2017-08-24 ENCOUNTER — OFFICE VISIT (OUTPATIENT)
Dept: FAMILY MEDICINE | Facility: CLINIC | Age: 67
End: 2017-08-24
Payer: COMMERCIAL

## 2017-08-24 VITALS
RESPIRATION RATE: 14 BRPM | DIASTOLIC BLOOD PRESSURE: 98 MMHG | OXYGEN SATURATION: 97 % | HEIGHT: 70 IN | SYSTOLIC BLOOD PRESSURE: 146 MMHG | TEMPERATURE: 97.1 F | HEART RATE: 62 BPM | BODY MASS INDEX: 29.35 KG/M2 | WEIGHT: 205 LBS

## 2017-08-24 DIAGNOSIS — I10 ESSENTIAL HYPERTENSION: Primary | ICD-10-CM

## 2017-08-24 DIAGNOSIS — B20 HUMAN IMMUNODEFICIENCY VIRUS (HIV) DISEASE (H): ICD-10-CM

## 2017-08-24 PROCEDURE — 99214 OFFICE O/P EST MOD 30 MIN: CPT | Performed by: FAMILY MEDICINE

## 2017-08-24 RX ORDER — FLUCONAZOLE 200 MG/1
200 TABLET ORAL DAILY
Qty: 14 TABLET | Refills: 11 | Status: CANCELLED | OUTPATIENT
Start: 2017-08-24

## 2017-08-24 RX ORDER — FLUCONAZOLE 200 MG/1
TABLET ORAL
Refills: 11 | COMMUNITY
Start: 2016-12-05 | End: 2017-12-13

## 2017-08-24 RX ORDER — HYDROCHLOROTHIAZIDE 25 MG/1
25 TABLET ORAL DAILY
Qty: 30 TABLET | Refills: 11 | Status: SHIPPED | OUTPATIENT
Start: 2017-08-24 | End: 2018-03-09

## 2017-08-24 NOTE — PROGRESS NOTES
Per pt:  Colonoscopy done [facility] 5-25-17  DEXA scan done at [facility]   Tdap done at [facility] 8-18-17    Info sent to joe.  ZACHARY Corona, CMA

## 2017-08-24 NOTE — NURSING NOTE
Chief Complaint   Patient presents with     Establish Care     new patient with [facility]     Hypertension     follow up       Per pt:  Colonoscopy done [facility] 5-25-17  DEXA scan done at [facility]   Tdap done at [facility] 8-18-17    Info sent to [facility] and Nino sent to [facility].ZACHARY Corona, CMA

## 2017-08-24 NOTE — PROGRESS NOTES
SUBJECTIVE:   Zac Rosenthal is a 66 year old male who presents to clinic today for the following health issues:      New Patient/Transfer of Care  Hypertension Follow-up      Outpatient blood pressures are being checked at home.  Results are 110- 130 / 60's- 80's    Low Salt Diet: not monitoring salt    Amount of exercise or physical activity: 6-7 days/week for an average of > 45-60 minutes    Problems taking medications regularly: No    Medication side effects: none  Diet: regular (no restrictions)    BP Readings from Last 6 Encounters:   17 (!) 146/98   17 165/88   17 147/84   17 (!) 181/97   16 143/87   16 155/84     Patient has mainly been following with [facility] for infectious disease  They suggested that he get a primary care provider to manage his other conditions  We discussed that he has elevated blood pressure as noted above  His readings at home with his home blood pressure cuff however tend to be much lower than this    He feels like he has maximized lifestyle treatment eats very well exercises regularly  He is unsure if he has high blood pressure history his dad was a doctor and didn't really pay attention to this  His mom  at a young age from cervical cancer    ROS: As per HPI.    CV: no palpitations, no chest pain, no lower extremity swelling.  Resp: no shortness of breath, wheezing, or cough.      I have reviewed and updated the patient's past medical history in the EMR. Current problems are:    Patient Active Problem List   Diagnosis     Human immunodeficiency virus (HIV) disease (H)     Anal dysplasia     Essential hypertension     History reviewed. No pertinent surgical history.    Social History   Substance Use Topics     Smoking status: Never Smoker     Smokeless tobacco: Never Used     Alcohol use 0.0 oz/week     0 Standard drinks or equivalent per week      Comment: 3x yr     Family History   Problem Relation Age of Onset     Cervical  "Cancer Mother      Lymphoma Mother      Other Cancer Mother      Myocardial Infarction Father      HEART DISEASE Brother          Allergies, reviewed:     Allergies   Allergen Reactions     Amoxicillin        Current Outpatient Prescriptions   Medication Sig Dispense Refill     hydrochlorothiazide (HYDRODIURIL) 25 MG tablet Take 1 tablet (25 mg) by mouth daily 30 tablet 11     STRIBILD [phone] mg tablet Take 1 tablet by mouth daily (with breakfast) Dispense only in original container. 31 tablet 11     ketoconazole (NIZORAL) 2 % cream Apply topically daily 15 g 11     fluconazole (DIFLUCAN) 200 MG tablet TK 1 T PO D  11       Objective:  BP (!) 146/98  Pulse 62  Temp 97.1  F (36.2  C) (Oral)  Resp 14  Ht 5' 10\" (1.778 m)  Wt 205 lb (93 kg)  SpO2 97%  BMI 29.41 kg/m2  General Appearance: Pleasant, alert, WN/WD in no acute respiratory distress.  Neurologic Exam: Nonfocal, no tremor. Normal gait.  Psychiatric Exam: Alert - appropriate, normal affect    ASSESSMENT/PLAN:    ICD-10-CM    1. Essential hypertension I10 hydrochlorothiazide (HYDRODIURIL) 25 MG tablet   2. Human immunodeficiency virus (HIV) disease (H) B20       Visit was mainly counseling about age and essential hypertension  Whitecoat hypertension  His blood pressures never really been normal at any visit and I think it's likely to be high most of the time though it might be lower in the morning at home  I think he should take a blood pressure medication anyway we could consider doing ambulatory blood pressure monitoring he has a problem with this or his blood pressure gets too low    Counselled on medication choice, use, side effects of medications, nonprescription adjunct treatment and appropriate follow up. Couns time: 20 minutes of 25 minutes total face to face time.    Francis Cordon MD MPH    "

## 2017-08-24 NOTE — NURSING NOTE
"Chief Complaint   Patient presents with     [facility]     new patient with [place]     Hypertension     follow up       Initial BP (!) 146/98  Pulse 62  Temp 97.1  F (36.2  C) (Oral)  Resp 14  Ht 5' 10\" (1.778 m)  Wt 205 lb (93 kg)  SpO2 97%  BMI 29.41 kg/m2 Estimated body mass index is 29.41 kg/(m^2) as calculated from the following:    Height as of this encounter: 5' 10\" (1.778 m).    Weight as of this encounter: 205 lb (93 kg).  BP completed using cuff size: regular    Health Maintenance that is potentially due pending provider review:  BP was high, used pink card, recheck manually and   Health Maintenance Due   Topic Date Due     ADVANCE DIRECTIVE PLANNING Q5 YRS  11/13/2004     FALL RISK ASSESSMENT  11/13/2014     AORTIC ANEURYSM SCREENING (SYSTEM ASSIGNED)  11/13/2014         Fall risk done-rest per provider  "

## 2017-08-24 NOTE — MR AVS SNAPSHOT
After Visit Summary   8/24/2017    aZc Rosenthal    MRN: [id number]           Patient Information     Date Of Birth          1950        Visit Information        Provider Department      8/24/2017 11:30 AM Francis Cordon MD [facility]        Today's Diagnoses     Essential hypertension    -  1    Human immunodeficiency virus (HIV) disease (H)           Follow-ups after your visit        Follow-up notes from your care team     Return in about 3 months (around 11/24/2017).      Your next 10 appointments already scheduled     Jan 31, 2018  3:00 PM CST   LAB with  LAB   [facility] ([facility])    [address] [place] [id number] [phone]           Patient must bring picture ID. Patient should be prepared to give a urine specimen  Please do not eat 10-12 hours before your appointment if you are coming in fasting for labs on lipids, cholesterol, or glucose (sugar). Pregnant women should follow their Care Team instructions. Water with medications is okay. Do not drink coffee or other fluids. If you have concerns about taking  your medications, please ask at office or if scheduling via [employer], send a message by clicking on Secure Messaging, Message Your Care Team.            Feb 16, 2018  8:30 AM CST   (Arrive by 8:15 AM)   Return Visit with Victor Hugo Dacosta MD   [facility] and Infectious Diseases ([facility])    [address] [place] [id number] [phone]              Who to contact     If you have questions or need follow up information about today's clinic visit or your schedule please contact [facility] directly at [phone].  Normal or non-critical lab and imaging results will be communicated to you by MyChart, letter or phone within 4 business days after the clinic has received the results. If you do not hear from us within 7  "days, please contact the clinic through [employer] or phone. If you have a critical or abnormal lab result, we will notify you by phone as soon as possible.  Submit refill requests through [employer] or call your pharmacy and they will forward the refill request to us. Please allow 3 business days for your refill to be completed.          Additional Information About Your Visit        [employer] Information     [employer] gives you secure access to your electronic health record. If you see a primary care provider, you can also send messages to your care team and make appointments. If you have questions, please call your primary care clinic.  If you do not have a primary care provider, please call [phone] and they will assist you.        Care EveryWhere ID     This is your Care EveryWhere ID. This could be used by other organizations to access your [place] medical records  [id number]        Your Vitals Were     Pulse Temperature Respirations Height Pulse Oximetry BMI (Body Mass Index)    62 97.1  F (36.2  C) (Oral) 14 5' 10\" (1.778 m) 97% 29.41 kg/m2       Blood Pressure from Last 3 Encounters:   08/24/17 (!) 146/98   08/18/17 165/88   04/13/17 147/84    Weight from Last 3 Encounters:   08/24/17 205 lb (93 kg)   08/18/17 202 lb 3.2 oz (91.7 kg)   04/13/17 206 lb (93.4 kg)              Today, you had the following     No orders found for display         Today's Medication Changes          These changes are accurate as of: 8/24/17 12:23 PM.  If you have any questions, ask your nurse or doctor.               Start taking these medicines.        Dose/Directions    hydrochlorothiazide 25 MG tablet   Commonly known as:  HYDRODIURIL   Used for:  Essential hypertension   Started by:  Francis Cordon MD        Dose:  25 mg   Take 1 tablet (25 mg) by mouth daily   Quantity:  30 tablet   Refills:  11            Where to get your medicines      These medications were sent to [facility] - [place], [place] - " [address] [facility] AT [facility]  [address] [facility] [address]    Hours:  24-hours Phone:  [phone]     hydrochlorothiazide 25 MG tablet                Primary Care Provider    None , MD       No address on file        Equal Access to Services     PRAVEEN CONSTANCE : Leandro florentin matso addi Smith, waaxda luqadaha, qaybta kaalmada adezuleymada, silverio sierra laQuinndmitry mims. So [facility] [phone].    ATENCIÓN: Si habla español, tiene a nicole disposición servicios gratuitos de asistencia lingüística. Llame al [phone].    We comply with applicable federal civil rights laws and [place] laws. We do not discriminate on the basis of race, color, national origin, age, disability sex, sexual orientation or gender identity.            Thank you!     Thank you for choosing [facility]  for your care. Our goal is always to provide you with excellent care. Hearing back from our patients is one way we can continue to improve our services. Please take a few minutes to complete the written survey that you may receive in the mail after your visit with us. Thank you!             Your Updated Medication List - Protect others around you: Learn how to safely use, store and throw away your medicines at www.disposemymeds.org.          This list is accurate as of: 8/24/17 12:23 PM.  Always use your most recent med list.                   Brand Name Dispense Instructions for use Diagnosis    Rrpmldz-Aomdy-Yjbwukws-TenofDF [phone] MG per tablet    STRIBILD    31 tablet    Take 1 tablet by mouth daily (with breakfast) Dispense only in original container.    Human immunodeficiency virus (HIV) disease (H)       fluconazole 200 MG tablet    DIFLUCAN     TK 1 T PO D        hydrochlorothiazide 25 MG tablet    HYDRODIURIL    30 tablet    Take 1 tablet (25 mg) by mouth daily    Essential hypertension       ketoconazole 2 % cream    NIZORAL    15 g    Apply topically daily    Human immunodeficiency virus  (HIV) disease (H)

## 2017-08-24 NOTE — Clinical Note
Per pt: Colonoscopy done [facility] 5-25-17 DEXA scan done at [facility]  Tdap done at [facility] 8-18-17

## 2017-09-19 ENCOUNTER — TELEPHONE (OUTPATIENT)
Dept: SURGERY | Facility: CLINIC | Age: 67
End: 2017-09-19

## 2017-09-19 NOTE — TELEPHONE ENCOUNTER
Left VM for pt to return my call to schedule follow-up (anal microscopy) appt w/ Rosangela Valentin (Colorectal).  Amisha [phone]

## 2017-10-12 ENCOUNTER — OFFICE VISIT (OUTPATIENT)
Dept: SURGERY | Facility: CLINIC | Age: 67
End: 2017-10-12

## 2017-10-12 VITALS
BODY MASS INDEX: 28.93 KG/M2 | TEMPERATURE: 98.2 F | HEART RATE: 59 BPM | HEIGHT: 70 IN | WEIGHT: 202.1 LBS | OXYGEN SATURATION: 98 % | SYSTOLIC BLOOD PRESSURE: 147 MMHG | DIASTOLIC BLOOD PRESSURE: 84 MMHG

## 2017-10-12 DIAGNOSIS — K62.82 ANAL DYSPLASIA: Primary | ICD-10-CM

## 2017-10-12 ASSESSMENT — PAIN SCALES - GENERAL: PAINLEVEL: NO PAIN (0)

## 2017-10-12 NOTE — PROGRESS NOTES
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 He was last seen in our clinic for HRA on 17 with biopsies at that time showing low grade dysplasia. Anal cytology on 16 was negative.     HPI: Zac Rosenthal is not a smoker. His HIV is under good control. He has no anorectal complaints and no other concerns today.   His underwent a colonoscopy on 17 with one tubular adenoma and repeat recommended after 5 years.    ASSESSMENT: Written, informed consent was obtained prior to procedure.  Prior to the start of the procedure and with procedural staff participation, I verbally confirmed the patient s identity using two indicators, relevant allergies, that the procedure was appropriate and matched the consent or emergent situation, and that the correct equipment/implants were available. Immediately prior to starting the procedure I conducted the Time Out with the procedural staff and re-confirmed the patient s name, procedure, and site/side. (The Joint Commission universal protocol was followed.)  Yes    Sedation (Moderate or Deep): None    Anal cytology was obtained today using a Dacron swab. Digital anal rectal exam was performed without any palpable lesions. Dilute acetic acid soak was completed for 2 minutes. Lubricant was used to insert the anoscope. Performed high resolution microscopy using the colposcope. The dentate line was viewed in its entirety. There was diffuse mild acetowhitening with some fine punctation versus striated vessels. No bright acetowhitening, coarse punctation or verruciform lesions. No biopsies obtained today.  The perianal area was inspected after acetic acid soak. Again some mild acetowhitening with striated vessels in the posterior but no bright acetowhitening, coarse punctation, or verruciform  lesions.    PLAN: Exam with normal to mild dysplasia noted. Repeat HRA in 6 months.   Repeat colonoscopy in 5 years.  Patient's questions were answered to his stated satisfaction and he is in agreement with this plan.    For details of past medical history, surgical history, family history, medications, allergies, and review of systems, please see details below.    Medical history:  No past medical history on file.    Surgical history:  No past surgical history on file.    Family history:  Family History   Problem Relation Age of Onset     Cervical Cancer Mother      Lymphoma Mother      Other Cancer Mother      Myocardial Infarction Father      HEART DISEASE Brother      Coronary Artery Disease Brother        Medications:  Current Outpatient Prescriptions   Medication Sig Dispense Refill     STRIBILD [phone] mg tablet Take 1 tablet by mouth daily (with breakfast) Dispense only in original container. 31 tablet 11     fluconazole (DIFLUCAN) 200 MG tablet TK 1 T PO D  11     hydrochlorothiazide (HYDRODIURIL) 25 MG tablet Take 1 tablet (25 mg) by mouth daily (Patient not taking: Reported on 10/12/2017) 30 tablet 11     ketoconazole (NIZORAL) 2 % cream Apply topically daily (Patient not taking: Reported on 10/12/2017) 15 g 11     Allergies:  The patientis allergic to amoxicillin.    Social history:  Social History   Substance Use Topics     Smoking status: Never Smoker     Smokeless tobacco: Never Used     Alcohol use 0.0 oz/week     0 Standard drinks or equivalent per week      Comment: 3x yr     Marital status: single.    Review of Systems:  Nursing Notes:   Anaya Burgos LPN  10/12/2017  2:00 PM  Signed  Chief Complaint   Patient presents with     Clinic Care Coordination - Follow-up     Patient here today for HRA procedure.        Vitals:    10/12/17 1357   BP: 147/84   BP Location: Left arm   Patient Position: Chair   Cuff Size: Adult Large   Pulse: 59   Temp: 98.2  F (36.8  C)   TempSrc: Oral   SpO2: 98%  "  Weight: 202 lb 1.6 oz   Height: 5' 10\"       Body mass index is 29 kg/(m^2).    Sarai MITCHELL LPN                        This procedure was performed under a collaborative agreement with Dr. Mikael Prabhakar MD, Chief of Colon and Rectal Surgery, [facility].    Rosangela Valentin NP-C  Colon and Rectal Surgery  [facility]  "

## 2017-10-12 NOTE — NURSING NOTE
"Chief Complaint   Patient presents with     Clinic Care Coordination - Follow-up     Patient here today for HRA procedure.        Vitals:    10/12/17 1357   BP: 147/84   BP Location: Left arm   Patient Position: Chair   Cuff Size: Adult Large   Pulse: 59   Temp: 98.2  F (36.8  C)   TempSrc: Oral   SpO2: 98%   Weight: 202 lb 1.6 oz   Height: 5' 10\"       Body mass index is 29 kg/(m^2).    Sarai MITCHELL LPN                     "

## 2017-10-12 NOTE — MR AVS SNAPSHOT
After Visit Summary   10/12/2017    Zac Rosenthal    MRN: [id number]           Patient Information     Date Of Birth          1950        Visit Information        Provider Department      10/12/2017 2:00 PM Rosangela Pritchett APRN [facility] Colon and Rectal Surgery        Today's Diagnoses     Anal dysplasia    -  1       Follow-ups after your visit        Your next 10 appointments already scheduled     Jan 31, 2018  3:00 PM CST   LAB with  LAB   [facility] ([facility])    [address] [place] [id number] [phone]           Patient must bring picture ID. Patient should be prepared to give a urine specimen  Please do not eat 10-12 hours before your appointment if you are coming in fasting for labs on lipids, cholesterol, or glucose (sugar). Pregnant women should follow their Care Team instructions. Water with medications is okay. Do not drink coffee or other fluids. If you have concerns about taking  your medications, please ask at office or if scheduling via [employer], send a message by clicking on Secure Messaging, Message Your Care Team.            Feb 16, 2018  8:30 AM CST   (Arrive by 8:15 AM)   Return Visit with Victor Hugo Dacosta MD   [facility] and Infectious Diseases ([facility])    [address] [place] [address] [phone]              Who to contact     Please call your clinic at [phone] to:    Ask questions about your health    Make or cancel appointments    Discuss your medicines    Learn about your test results    Speak to your doctor   If you have compliments or concerns about an experience at your clinic, or if you wish to file a complaint, please contact [facility] Patient Relations at [phone] or email us at [email or web address]         Additional Information About Your Visit       "  MyChart Information     [employer] gives you secure access to your electronic health record. If you see a primary care provider, you can also send messages to your care team and make appointments. If you have questions, please call your primary care clinic.  If you do not have a primary care provider, please call [phone] and they will assist you.      [employer] is an electronic gateway that provides easy, online access to your medical records. With [employer], you can request a clinic appointment, read your test results, renew a prescription or communicate with your care team.     To access your existing account, please contact your [facility] or call [phone] for assistance.        Care EveryWhere ID     This is your Care EveryWhere ID. This could be used by other organizations to access your [place] medical records  [id number]        Your Vitals Were     Pulse Temperature Height Pulse Oximetry BMI (Body Mass Index)       59 98.2  F (36.8  C) (Oral) 5' 10\" 98% 29 kg/m2        Blood Pressure from Last 3 Encounters:   10/12/17 147/84   08/24/17 (!) 146/98   08/18/17 165/88    Weight from Last 3 Encounters:   10/12/17 202 lb 1.6 oz   08/24/17 205 lb   08/18/17 202 lb 3.2 oz              We Performed the Following     Cytology non gyn (Anal PAP)     HC ANOSCOPY DX, HRA, INCL BRANDAN SPECIMEN, BRUSH/WASH        Primary Care Provider Office Phone # Fax #    Francis Wyatt Cordon [phone] [address] [facility] [address]        Equal Access to Services     DELORES NOLASCO : Hadii aad ku hadasho Soomaali, waaxda luqadaha, qaybta kaalmada adeegyadarby, waxay idiin hayaan adeeg kharash la'aan . So [facility] [phone].    ATENCIÓN: Si habla español, tiene a nicole disposición servicios gratuitos de asistencia lingüística. Llame al [phone].    We comply with applicable federal civil rights laws and [place] laws. We do not discriminate on the basis of race, " color, national origin, age, disability, sex, sexual orientation, or gender identity.            Thank you!     Thank you for choosing [facility] COLON AND RECTAL SURGERY  for your care. Our goal is always to provide you with excellent care. Hearing back from our patients is one way we can continue to improve our services. Please take a few minutes to complete the written survey that you may receive in the mail after your visit with us. Thank you!             Your Updated Medication List - Protect others around you: Learn how to safely use, store and throw away your medicines at www.disposemymeds.org.          This list is accurate as of: 10/12/17  3:10 PM.  Always use your most recent med list.                   Brand Name Dispense Instructions for use Diagnosis    Rzuyxit-Yklne-Mbwinnmh-TenofDF [phone] MG per tablet    STRIBILD    31 tablet    Take 1 tablet by mouth daily (with breakfast) Dispense only in original container.    Human immunodeficiency virus (HIV) disease       fluconazole 200 MG tablet    DIFLUCAN     TK 1 T PO D        hydrochlorothiazide 25 MG tablet    HYDRODIURIL    30 tablet    Take 1 tablet (25 mg) by mouth daily    Essential hypertension       ketoconazole 2 % cream    NIZORAL    15 g    Apply topically daily    Human immunodeficiency virus (HIV) disease

## 2017-10-12 NOTE — LETTER
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 He was last seen in our clinic for HRA on 17 with biopsies at that time showing low grade dysplasia. Anal cytology on 16 was negative.     HPI: Zac Rosenthal is not a smoker. His HIV is under good control. He has no anorectal complaints and no other concerns today.   His underwent a colonoscopy on 17 with one tubular adenoma and repeat recommended after 5 years.    ASSESSMENT: Written, informed consent was obtained prior to procedure.  Prior to the start of the procedure and with procedural staff participation, I verbally confirmed the patient s identity using two indicators, relevant allergies, that the procedure was appropriate and matched the consent or emergent situation, and that the correct equipment/implants were available. Immediately prior to starting the procedure I conducted the Time Out with the procedural staff and re-confirmed the patient s name, procedure, and site/side. (The Joint Commission universal protocol was followed.)  Yes    Sedation (Moderate or Deep): None    Anal cytology was obtained today using a Dacron swab. Digital anal rectal exam was performed without any palpable lesions. Dilute acetic acid soak was completed for 2 minutes. Lubricant was used to insert the anoscope. Performed high resolution microscopy using the colposcope. The dentate line was viewed in its entirety. There was diffuse mild acetowhitening with some fine punctation versus striated vessels. No bright acetowhitening, coarse punctation or verruciform lesions. No biopsies obtained today.  The perianal area was inspected after acetic acid soak. Again some mild acetowhitening with striated  vessels in the posterior but no bright acetowhitening, coarse punctation, or verruciform lesions.    PLAN: Exam with normal to mild dysplasia noted. Repeat HRA in 6 months.   Repeat colonoscopy in 5 years.  Patient's questions were answered to his stated satisfaction and he is in agreement with this plan.    For details of past medical history, surgical history, family history, medications, allergies, and review of systems, please see details below.    Medical history:  No past medical history on file.    Surgical history:  No past surgical history on file.    Family history:  Family History   Problem Relation Age of Onset     Cervical Cancer Mother      Lymphoma Mother      Other Cancer Mother      Myocardial Infarction Father      HEART DISEASE Brother      Coronary Artery Disease Brother        Medications:  Current Outpatient Prescriptions   Medication Sig Dispense Refill     STRIBILD [phone] mg tablet Take 1 tablet by mouth daily (with breakfast) Dispense only in original container. 31 tablet 11     fluconazole (DIFLUCAN) 200 MG tablet TK 1 T PO D  11     hydrochlorothiazide (HYDRODIURIL) 25 MG tablet Take 1 tablet (25 mg) by mouth daily (Patient not taking: Reported on 10/12/2017) 30 tablet 11     ketoconazole (NIZORAL) 2 % cream Apply topically daily (Patient not taking: Reported on 10/12/2017) 15 g 11     Allergies:  The patientis allergic to amoxicillin.    Social history:  Social History   Substance Use Topics     Smoking status: Never Smoker     Smokeless tobacco: Never Used     Alcohol use 0.0 oz/week     0 Standard drinks or equivalent per week      Comment: 3x yr     Marital status: single.    Review of Systems:  Nursing Notes:   Anaya Burgos LPN  10/12/2017  2:00 PM  Signed  Chief Complaint   Patient presents with     Clinic Care Coordination - Follow-up     Patient here today for HRA procedure.        Vitals:    10/12/17 1357   BP: 147/84   BP Location: Left arm   Patient Position: Chair  "  Cuff Size: Adult Large   Pulse: 59   Temp: 98.2  F (36.8  C)   TempSrc: Oral   SpO2: 98%   Weight: 202 lb 1.6 oz   Height: 5' 10\"       Body mass index is 29 kg/(m^2).    Sarai MITCHELL LPN                        This procedure was performed under a collaborative agreement with Dr. Mikael Prabhakar MD, Chief of Colon and Rectal Surgery, [facility].    Rosangela Kaur, NP-C  Colon and Rectal Surgery  [facility]    Rosangela Kaur, APRN CNP      "

## 2017-10-13 LAB — COPATH REPORT: NORMAL

## 2017-10-26 DIAGNOSIS — Z21 HIV (HUMAN IMMUNODEFICIENCY VIRUS INFECTION) (H): Primary | ICD-10-CM

## 2017-12-13 DIAGNOSIS — Z21 HIV (HUMAN IMMUNODEFICIENCY VIRUS INFECTION) (H): Primary | ICD-10-CM

## 2017-12-13 RX ORDER — KETOCONAZOLE 20 MG/G
CREAM TOPICAL DAILY
Qty: 15 G | Refills: 11 | Status: SHIPPED | OUTPATIENT
Start: 2017-12-13 | End: 2018-05-07

## 2017-12-13 RX ORDER — FLUCONAZOLE 200 MG/1
TABLET ORAL
Qty: 30 TABLET | Refills: 3 | Status: SHIPPED | OUTPATIENT
Start: 2017-12-13 | End: 2018-05-07

## 2018-02-21 DIAGNOSIS — B20 HUMAN IMMUNODEFICIENCY VIRUS (HIV) DISEASE (H): ICD-10-CM

## 2018-02-21 LAB
ALBUMIN SERPL-MCNC: 3.9 G/DL (ref 3.4–5)
ALP SERPL-CCNC: 82 U/L (ref 40–150)
ALT SERPL W P-5'-P-CCNC: 22 U/L (ref 0–70)
ANION GAP SERPL CALCULATED.3IONS-SCNC: 6 MMOL/L (ref 3–14)
AST SERPL W P-5'-P-CCNC: 23 U/L (ref 0–45)
BASOPHILS # BLD AUTO: 0 10E9/L (ref 0–0.2)
BASOPHILS NFR BLD AUTO: 0.3 %
BILIRUB SERPL-MCNC: 0.9 MG/DL (ref 0.2–1.3)
BUN SERPL-MCNC: 10 MG/DL (ref 7–30)
CALCIUM SERPL-MCNC: 8.9 MG/DL (ref 8.5–10.1)
CD3 CELLS # BLD: 1874 CELLS/UL (ref 603–2990)
CD3 CELLS NFR BLD: 70 % (ref 49–84)
CD3+CD4+ CELLS # BLD: 848 CELLS/UL (ref 441–2156)
CD3+CD4+ CELLS NFR BLD: 32 % (ref 28–63)
CD3+CD4+ CELLS/CD3+CD8+ CLL BLD: 0.86 % (ref 1.4–2.6)
CD3+CD8+ CELLS # BLD: 989 CELLS/UL (ref 125–1312)
CD3+CD8+ CELLS NFR BLD: 37 % (ref 10–40)
CHLORIDE SERPL-SCNC: 94 MMOL/L (ref 94–109)
CO2 SERPL-SCNC: 27 MMOL/L (ref 20–32)
CREAT SERPL-MCNC: 0.85 MG/DL (ref 0.66–1.25)
DIFFERENTIAL METHOD BLD: NORMAL
EOSINOPHIL # BLD AUTO: 0.1 10E9/L (ref 0–0.7)
EOSINOPHIL NFR BLD AUTO: 1.1 %
ERYTHROCYTE [DISTWIDTH] IN BLOOD BY AUTOMATED COUNT: 11.6 % (ref 10–15)
GFR SERPL CREATININE-BSD FRML MDRD: 89 ML/MIN/1.7M2
GLUCOSE SERPL-MCNC: 80 MG/DL (ref 70–99)
HCT VFR BLD AUTO: 47.3 % (ref 40–53)
HGB BLD-MCNC: 17.1 G/DL (ref 13.3–17.7)
IFC SPECIMEN: ABNORMAL
IMM GRANULOCYTES # BLD: 0 10E9/L (ref 0–0.4)
IMM GRANULOCYTES NFR BLD: 0.3 %
LYMPHOCYTES # BLD AUTO: 2.4 10E9/L (ref 0.8–5.3)
LYMPHOCYTES NFR BLD AUTO: 33.5 %
MCH RBC QN AUTO: 32.3 PG (ref 26.5–33)
MCHC RBC AUTO-ENTMCNC: 36.2 G/DL (ref 31.5–36.5)
MCV RBC AUTO: 89 FL (ref 78–100)
MONOCYTES # BLD AUTO: 0.7 10E9/L (ref 0–1.3)
MONOCYTES NFR BLD AUTO: 10.1 %
NEUTROPHILS # BLD AUTO: 3.8 10E9/L (ref 1.6–8.3)
NEUTROPHILS NFR BLD AUTO: 54.7 %
NRBC # BLD AUTO: 0 10*3/UL
NRBC BLD AUTO-RTO: 0 /100
PLATELET # BLD AUTO: 278 10E9/L (ref 150–450)
POTASSIUM SERPL-SCNC: 3.6 MMOL/L (ref 3.4–5.3)
PROT SERPL-MCNC: 7.2 G/DL (ref 6.8–8.8)
RBC # BLD AUTO: 5.3 10E12/L (ref 4.4–5.9)
SODIUM SERPL-SCNC: 127 MMOL/L (ref 133–144)
WBC # BLD AUTO: 7 10E9/L (ref 4–11)

## 2018-03-09 ENCOUNTER — OFFICE VISIT (OUTPATIENT)
Dept: FAMILY MEDICINE | Facility: CLINIC | Age: 68
End: 2018-03-09
Payer: COMMERCIAL

## 2018-03-09 ENCOUNTER — OFFICE VISIT (OUTPATIENT)
Dept: INFECTIOUS DISEASES | Facility: CLINIC | Age: 68
End: 2018-03-09
Attending: INTERNAL MEDICINE
Payer: COMMERCIAL

## 2018-03-09 VITALS
BODY MASS INDEX: 29.14 KG/M2 | DIASTOLIC BLOOD PRESSURE: 82 MMHG | SYSTOLIC BLOOD PRESSURE: 143 MMHG | OXYGEN SATURATION: 98 % | WEIGHT: 203.1 LBS | HEART RATE: 86 BPM

## 2018-03-09 VITALS
SYSTOLIC BLOOD PRESSURE: 179 MMHG | BODY MASS INDEX: 28.88 KG/M2 | TEMPERATURE: 97.7 F | DIASTOLIC BLOOD PRESSURE: 89 MMHG | HEIGHT: 70 IN | HEART RATE: 65 BPM | WEIGHT: 201.7 LBS

## 2018-03-09 DIAGNOSIS — I10 ESSENTIAL HYPERTENSION: ICD-10-CM

## 2018-03-09 DIAGNOSIS — K62.82 ANAL DYSPLASIA: ICD-10-CM

## 2018-03-09 DIAGNOSIS — B20 HUMAN IMMUNODEFICIENCY VIRUS (HIV) DISEASE (H): Primary | ICD-10-CM

## 2018-03-09 DIAGNOSIS — I10 ESSENTIAL HYPERTENSION: Primary | ICD-10-CM

## 2018-03-09 PROCEDURE — 99214 OFFICE O/P EST MOD 30 MIN: CPT | Performed by: FAMILY MEDICINE

## 2018-03-09 PROCEDURE — G0463 HOSPITAL OUTPT CLINIC VISIT: HCPCS | Mod: ZF

## 2018-03-09 RX ORDER — LISINOPRIL 20 MG/1
20 TABLET ORAL DAILY
Qty: 30 TABLET | Refills: 11 | Status: SHIPPED | OUTPATIENT
Start: 2018-03-09 | End: 2019-02-18

## 2018-03-09 ASSESSMENT — PAIN SCALES - GENERAL: PAINLEVEL: NO PAIN (0)

## 2018-03-09 NOTE — MR AVS SNAPSHOT
After Visit Summary   3/9/2018    Zac Rosenthal    MRN: [id number]           Patient Information     Date Of Birth          1950        Visit Information        Provider Department      3/9/2018 9:00 AM Victor Hugo Dacosta MD [facility] and Infectious Diseases        Today's Diagnoses     Human immunodeficiency virus (HIV) disease (H)    -  1    Anal dysplasia        Essential hypertension           Follow-ups after your visit        Follow-up notes from your care team     Return in about 6 months (around 9/9/2018).      Your next 10 appointments already scheduled     Aug 30, 2018  3:00 PM CDT   LAB with  LAB   [facility] ([facility])    [address]  1st Floor  [facility] [id number] [phone]           Please do not eat 10-12 hours before your appointment if you are coming in fasting for labs on lipids, cholesterol, or glucose (sugar). This does not apply to pregnant women. Water, hot tea and black coffee (with nothing added) are okay. Do not drink other fluids, diet soda or chew gum.            Sep 14, 2018  8:30 AM CDT   (Arrive by 8:15 AM)   Return Visit with Victor Hugo Dacosta MD   [facility] Infect[facility] ([facility])    [address] [place] [address]  [address]  [facility] [id number] [phone]              Future tests that were ordered for you today     Open Future Orders        Priority Expected Expires Ordered    Comprehensive metabolic panel Routine 8/9/2018 10/9/2018 3/9/2018    Amylase Routine 8/9/2018 10/9/2018 3/9/2018    CBC with platelets differential Routine 8/9/2018 10/9/2018 3/9/2018    HIV-1 RNA quantitative Routine 8/9/2018 10/9/2018 3/9/2018    T cell subset profile Routine 8/9/2018 10/9/2018 3/9/2018            Who to contact     If you have questions or need follow up information about today's clinic visit or your schedule please contact [facility]  "STREET AND INFECTIOUS DISEASES directly at [phone].  Normal or non-critical lab and imaging results will be communicated to you by MyChart, letter or phone within 4 business days after the clinic has received the results. If you do not hear from us within 7 days, please contact the clinic through Vindiciahart or phone. If you have a critical or abnormal lab result, we will notify you by phone as soon as possible.  Submit refill requests through independenceIT or call your pharmacy and they will forward the refill request to us. Please allow 3 business days for your refill to be completed.          Additional Information About Your Visit        VindiciaharOmniture Information     independenceIT gives you secure access to your electronic health record. If you see a primary care provider, you can also send messages to your care team and make appointments. If you have questions, please call your primary care clinic.  If you do not have a primary care provider, please call [phone] and they will assist you.        Care EveryWhere ID     This is your Care EveryWhere ID. This could be used by other organizations to access your [facility] medical records  [id number]        Your Vitals Were     Pulse Temperature Height BMI (Body Mass Index)          65 97.7  F (36.5  C) (Oral) 1.778 m (5' 10\") 28.94 kg/m2         Blood Pressure from Last 3 Encounters:   03/09/18 179/89   10/12/17 147/84   08/24/17 (!) 146/98    Weight from Last 3 Encounters:   03/09/18 91.5 kg (201 lb 11.2 oz)   10/12/17 91.7 kg (202 lb 1.6 oz)   08/24/17 93 kg (205 lb)               Primary Care Provider Office Phone # Fax #    Francis Wyatt Cordon [phone] [address] [facility] [address]        Equal Access to Services     DELORES NOLASCO : Leandro Smith, waandrew fernandez, qaybta kaalsilverio ayoub. So [facility] [phone].    ATENCIÓN: Si habla español, tiene a nicole disposición servicios " suleiman de asistencia lingüística. Daphne arango [phone].    We comply with applicable federal civil rights laws and [place] laws. We do not discriminate on the basis of race, color, national origin, age, disability, sex, sexual orientation, or gender identity.            Thank you!     Thank you for choosing [facility] AND INFECTIOUS DISEASES  for your care. Our goal is always to provide you with excellent care. Hearing back from our patients is one way we can continue to improve our services. Please take a few minutes to complete the written survey that you may receive in the mail after your visit with us. Thank you!             Your Updated Medication List - Protect others around you: Learn how to safely use, store and throw away your medicines at www.disposemymeds.org.          This list is accurate as of 3/9/18  9:39 AM.  Always use your most recent med list.                   Brand Name Dispense Instructions for use Diagnosis    Wozkpvk-Hlvhv-Gwericdj-TenofDF [id number] MG per tablet    STRIBILD    90 tablet    Take 1 tablet by mouth daily with food    HIV (human immunodeficiency virus infection) (H)       fluconazole 200 MG tablet    DIFLUCAN    30 tablet    Take 1 tablet by mouth daily.    HIV (human immunodeficiency virus infection) (H)       hydrochlorothiazide 25 MG tablet    HYDRODIURIL    30 tablet    Take 1 tablet (25 mg) by mouth daily    Essential hypertension       ketoconazole 2 % cream    NIZORAL    15 g    Apply topically daily    HIV (human immunodeficiency virus infection) (H)

## 2018-03-09 NOTE — NURSING NOTE
"Chief Complaint   Patient presents with     RECHECK       Initial /82 (BP Location: Left arm, Patient Position: Chair, Cuff Size: Adult Regular)  Pulse 86  Wt 203 lb 1.6 oz (92.1 kg)  SpO2 98%  BMI 29.14 kg/m2 Estimated body mass index is 29.14 kg/(m^2) as calculated from the following:    Height as of an earlier encounter on 3/9/18: 5' 10\" (1.778 m).    Weight as of this encounter: 203 lb 1.6 oz (92.1 kg).  Medication Reconciliation: complete     Beverly Medina MA     "

## 2018-03-09 NOTE — MR AVS SNAPSHOT
After Visit Summary   3/9/2018    Zac Rosenthal    MRN: [id number]           Patient Information     Date Of Birth          1950        Visit Information        Provider Department      3/9/2018 3:00 PM Francis Cordon MD [facility]        Today's Diagnoses     Essential hypertension    -  1       Follow-ups after your visit        Follow-up notes from your care team     Return in about 4 weeks (around 4/6/2018) for BP Recheck.      Your next 10 appointments already scheduled     Aug 30, 2018  3:00 PM CDT   LAB with  LAB   [facility] ([facility])    [address]  1st Floor  [facility] [id number] [phone]           Please do not eat 10-12 hours before your appointment if you are coming in fasting for labs on lipids, cholesterol, or glucose (sugar). This does not apply to pregnant women. Water, hot tea and black coffee (with nothing added) are okay. Do not drink other fluids, diet soda or chew gum.            Sep 14, 2018  8:30 AM CDT   (Arrive by 8:15 AM)   Return Visit with Victor Hugo Dacosta MD   [facility] and Infectious Diseases ([facility])    [address] [place] [address]  [address]  [facility] [id number] [phone]              Future tests that were ordered for you today     Open Future Orders        Priority Expected Expires Ordered    Comprehensive metabolic panel Routine 8/9/2018 10/9/2018 3/9/2018    Amylase Routine 8/9/2018 10/9/2018 3/9/2018    CBC with platelets differential Routine 8/9/2018 10/9/2018 3/9/2018    HIV-1 RNA quantitative Routine 8/9/2018 10/9/2018 3/9/2018    T cell subset profile Routine 8/9/2018 10/9/2018 3/9/2018            Who to contact     If you have questions or need follow up information about today's clinic visit or your schedule please contact [facility] directly at [phone].  Normal or non-critical lab and imaging results will be  communicated to you by Quant the [employer], letter or phone within 4 business days after the clinic has received the results. If you do not hear from us within 7 days, please contact the clinic through CatchThatBus or phone. If you have a critical or abnormal lab result, we will notify you by phone as soon as possible.  Submit refill requests through CatchThatBus or call your pharmacy and they will forward the refill request to us. Please allow 3 business days for your refill to be completed.          Additional Information About Your Visit        CatchThatBus Information     CatchThatBus gives you secure access to your electronic health record. If you see a primary care provider, you can also send messages to your care team and make appointments. If you have questions, please call your primary care clinic.  If you do not have a primary care provider, please call [phone] and they will assist you.        Care EveryWhere ID     This is your Care EveryWhere ID. This could be used by other organizations to access your [facility] medical records  [id number]        Your Vitals Were     Pulse Pulse Oximetry BMI (Body Mass Index)             86 98% 29.14 kg/m2          Blood Pressure from Last 3 Encounters:   03/09/18 143/82   03/09/18 179/89   10/12/17 147/84    Weight from Last 3 Encounters:   03/09/18 203 lb 1.6 oz (92.1 kg)   03/09/18 201 lb 11.2 oz (91.5 kg)   10/12/17 202 lb 1.6 oz (91.7 kg)              Today, you had the following     No orders found for display         Today's Medication Changes          These changes are accurate as of 3/9/18  3:42 PM.  If you have any questions, ask your nurse or doctor.               Start taking these medicines.        Dose/Directions    lisinopril 20 MG tablet   Commonly known as:  PRINIVIL/ZESTRIL   Used for:  Essential hypertension   Started by:  Francis Cordon MD        Dose:  20 mg   Take 1 tablet (20 mg) by mouth daily   Quantity:  30 tablet   Refills:  11         Stop taking these  medicines if you haven't already. Please contact your care team if you have questions.     hydrochlorothiazide 25 MG tablet   Commonly known as:  HYDRODIURIL   Stopped by:  Francis Cordon MD                Where to get your medicines      These medications were sent to [facility] [address] [place] [address] AT [address] [address]    Hours:  24-hours Phone:  [phone]     lisinopril 20 MG tablet                Primary Care Provider Office Phone # Fax #    Francis Cordon [phone] [address] [facility] [address]        Equal Access to Services     PRAVEEN NOLASCO : Hadii aad ku hadasho Soomaali, waaxda luqadaha, qaybta kaalmada adeegyada, waxay laurain hayemirn jacinto yanez . So [facility] [phone].    ATENCIÓN: Si habla español, tiene a nicole disposición servicios gratuitos de asistencia lingüística. Llame al [phone].    We comply with applicable federal civil rights laws and [place] laws. We do not discriminate on the basis of race, color, national origin, age, disability, sex, sexual orientation, or gender identity.            Thank you!     Thank you for choosing [facility]  for your care. Our goal is always to provide you with excellent care. Hearing back from our patients is one way we can continue to improve our services. Please take a few minutes to complete the written survey that you may receive in the mail after your visit with us. Thank you!             Your Updated Medication List - Protect others around you: Learn how to safely use, store and throw away your medicines at www.disposemymeds.org.          This list is accurate as of 3/9/18  3:42 PM.  Always use your most recent med list.                   Brand Name Dispense Instructions for use Diagnosis    Yxvedci-Vfmnt-Agwsbqsa-TenofDF [id number] MG per tablet    STRIBILD    90 tablet    Take 1 tablet by mouth daily with  food    HIV (human immunodeficiency virus infection) (H)       fluconazole 200 MG tablet    DIFLUCAN    30 tablet    Take 1 tablet by mouth daily.    HIV (human immunodeficiency virus infection) (H)       ketoconazole 2 % cream    NIZORAL    15 g    Apply topically daily    HIV (human immunodeficiency virus infection) (H)       lisinopril 20 MG tablet    PRINIVIL/ZESTRIL    30 tablet    Take 1 tablet (20 mg) by mouth daily    Essential hypertension

## 2018-03-09 NOTE — NURSING NOTE
"Chief Complaint   Patient presents with     RECHECK     follow up with B20, tzimmer cma       Initial /89  Pulse 65  Temp 97.7  F (36.5  C) (Oral)  Ht 1.778 m (5' 10\")  Wt 91.5 kg (201 lb 11.2 oz)  BMI 28.94 kg/m2 Estimated body mass index is 28.94 kg/(m^2) as calculated from the following:    Height as of this encounter: 1.778 m (5' 10\").    Weight as of this encounter: 91.5 kg (201 lb 11.2 oz).  Medication Reconciliation: complete    "

## 2018-03-09 NOTE — PROGRESS NOTES
SUBJECTIVE:   Zac Rosenthal is a 67 year old male who presents to clinic today for the following health issues:      Hypertension Follow-up          Outpatient blood pressures are not being checked.    Low Salt Diet: not monitoring salt      Amount of exercise or physical activity: 6-7 days/week for an average of greater than 60 minutes    Problems taking medications regularly: No    Medication side effects: none    Diet: low fat/cholesterol    Pt states that he has been experiencing some dizziness - with the HCTZ, would like to try something different      BP Readings from Last 6 Encounters:   18 143/82   18 179/89   10/12/17 147/84   17 (!) 146/98   17 165/88   17 147/84       Patient has mainly been following with [facility] for infectious disease  They suggested that he get a primary care provider to manage his other conditions  He was seen again there recently and they noted his blood pressure was still high despite starting hydrochlorthiazide        He feels like he has maximized lifestyle treatment eats very well exercises regularly  He is unsure if he has high blood pressure history his dad was a doctor and didn't really pay attention to this  His mom  at a young age from cervical cancer    ROS: As per HPI.    CV: no palpitations, no chest pain, no lower extremity swelling.  Resp: no shortness of breath, wheezing, or cough.      I have reviewed and updated the patient's past medical history in the EMR. Current problems are:    Patient Active Problem List   Diagnosis     Human immunodeficiency virus (HIV) disease (H)     Anal dysplasia     Essential hypertension     History reviewed. No pertinent surgical history.    Social History   Substance Use Topics     Smoking status: Never Smoker     Smokeless tobacco: Never Used     Alcohol use 0.0 oz/week     0 Standard drinks or equivalent per week      Comment: 3x yr     Family History   Problem Relation Age of Onset      Cervical Cancer Mother      Lymphoma Mother      Other Cancer Mother      Myocardial Infarction Father      HEART DISEASE Brother      Coronary Artery Disease Brother          Allergies, reviewed:     Allergies   Allergen Reactions     Amoxicillin        Current Outpatient Prescriptions   Medication Sig Dispense Refill     lisinopril (PRINIVIL/ZESTRIL) 20 MG tablet Take 1 tablet (20 mg) by mouth daily 30 tablet 11     ketoconazole (NIZORAL) 2 % cream Apply topically daily 15 g 11     fluconazole (DIFLUCAN) 200 MG tablet Take 1 tablet by mouth daily. 30 tablet 3     STRIBILD [phone] mg tablet Take 1 tablet by mouth daily with food 90 tablet 3       Objective:  /82 (BP Location: Left arm, Patient Position: Chair, Cuff Size: Adult Regular)  Pulse 86  Wt 203 lb 1.6 oz (92.1 kg)  SpO2 98%  BMI 29.14 kg/m2  General Appearance: Pleasant, alert, WN/WD in no acute respiratory distress.  Neurologic Exam: Nonfocal, no tremor. Normal gait.  Psychiatric Exam: Alert - appropriate, normal affect    ASSESSMENT/PLAN:    ICD-10-CM    1. Essential hypertension I10 lisinopril (PRINIVIL/ZESTRIL) 20 MG tablet      Visit was mainly counseling about age and essential hypertension  Home blood pressures have been in the 120s-150s  However he is getting some lightheadedness on the hydrochlorothiazide we will try switching him to lisinopril  We will also consider adding amlodipine to that after he is adjusted to lisinopril    Counselled on medication choice, use, side effects of medications, nonprescription adjunct treatment and appropriate follow up. Couns time: 20 minutes of 25 minutes total face to face time.    Follow-up with home blood pressure monitoring and in clinic follow-up    Francis Cordon MD MPH

## 2018-03-09 NOTE — PROGRESS NOTES
Infectious Disease Clinic  HIV Follow Up Visit    HPI:  Zac Rosenthal is a 66 year old male who is here for follow-up on HIV treatment.  He is currently taking Stribild once a day without missing doses and no side effects.  He feels well and has no concerns.     Had a high resolution anoscopy in April 2017 that showed AIN1 and had a repeat in October 2017 with pap showing ASCUS. Also had a colonoscopy which showed a tubular adenoma in April 2017.    He established primary care with family medicine, Dr. Francis Cordon, who started hum on HCTZ for hypertension.    ROS:   Eyes: negative, visual blurring, double vision  Ears/Nose/Throat: negative, hearing loss, vertigo  Respiratory: No shortness of breath, dyspnea on exertion, cough, or hemoptysis  Cardiovascular: negative, tachycardia, irregular heart beat, chest pain and exertional chest pain or pressure  Gastrointestinal: negative, poor appetite, nausea, vomiting, abdominal pain, constipation and diarrhea  Genitourinary: negative, frequency, urgency and hematuria  Musculoskeletal: negative, joint pain and joint swelling  Neurologic: negative, headaches, syncope, numbness or tingling of hands and numbness or tingling of feet  Hematologic/Lymphatic/Immunologic: negative, chills and fever      Medications:  Current Outpatient Prescriptions   Medication Sig Dispense Refill     ketoconazole (NIZORAL) 2 % cream Apply topically daily 15 g 11     STRIBILD [phone] mg tablet Take 1 tablet by mouth daily with food 90 tablet 3     hydrochlorothiazide (HYDRODIURIL) 25 MG tablet Take 1 tablet (25 mg) by mouth daily 30 tablet 11     fluconazole (DIFLUCAN) 200 MG tablet Take 1 tablet by mouth daily. 30 tablet 3       Exam:  /89  Wt Readings from Last 2 Encounters:   03/09/18 91.5 kg (201 lb 11.2 oz)   10/12/17 91.7 kg (202 lb 1.6 oz)       Physical Exam   Constitutional: He is well-developed, well-nourished, and in no distress.   Cardiovascular: Normal rate and  regular rhythm.    Pulmonary/Chest: Breath sounds normal.   Abdominal: Soft. Bowel sounds are normal.   Lymphadenopathy:     He has no cervical adenopathy.   Neurological: He is alert.   Psychiatric: Affect and judgment normal.         Assessment and Plan:  Zac Rosenthal is a 66 year old male who is here for followup on HIV treatment.    #HIV: Well controlled on Stribild. No missed doses. Will repeat HIV labs in 6 months, before his next appt    #HTN: The patient will call his PCP to address his elevated BP.    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 Anal Pap in Oct 2017 showed ASCUS.  Repeat anoscopy scheduled for April 2018.     #Colonic tubular adenoma polyp in April 2017.  Colonoscopy recommended after 5 years from then (4 more years).      Victor Hugo Dacosta MD  Professor of Medicine  Answers for HPI/ROS submitted by the patient on 3/4/2018   General Symptoms: No  Skin Symptoms: No  HENT Symptoms: No  EYE SYMPTOMS: No  HEART SYMPTOMS: No  LUNG SYMPTOMS: No  INTESTINAL SYMPTOMS: No  URINARY SYMPTOMS: No  REPRODUCTIVE SYMPTOMS: No  SKELETAL SYMPTOMS: No  BLOOD SYMPTOMS: No  NERVOUS SYSTEM SYMPTOMS: No  MENTAL HEALTH SYMPTOMS: No

## 2018-03-09 NOTE — LETTER
3/9/2018      RE: Zac Rosenthal  [address]   [facility] [address]       Infectious Disease Clinic  HIV Follow Up Visit    HPI:  Zac Rosenthal is a 66 year old male who is here for follow-up on HIV treatment.  He is currently taking Stribild once a day without missing doses and no side effects.  He feels well and has no concerns.     Had a high resolution anoscopy in April 2017 that showed AIN1 and had a repeat in October 2017 with pap showing ASCUS. Also had a colonoscopy which showed a tubular adenoma in April 2017.    He established primary care with family medicine, Dr. Francis Cordon, who started hum on HCTZ for hypertension.    ROS:   Eyes: negative, visual blurring, double vision  Ears/Nose/Throat: negative, hearing loss, vertigo  Respiratory: No shortness of breath, dyspnea on exertion, cough, or hemoptysis  Cardiovascular: negative, tachycardia, irregular heart beat, chest pain and exertional chest pain or pressure  Gastrointestinal: negative, poor appetite, nausea, vomiting, abdominal pain, constipation and diarrhea  Genitourinary: negative, frequency, urgency and hematuria  Musculoskeletal: negative, joint pain and joint swelling  Neurologic: negative, headaches, syncope, numbness or tingling of hands and numbness or tingling of feet  Hematologic/Lymphatic/Immunologic: negative, chills and fever      Medications:  Current Outpatient Prescriptions   Medication Sig Dispense Refill     ketoconazole (NIZORAL) 2 % cream Apply topically daily 15 g 11     STRIBILD [phone] mg tablet Take 1 tablet by mouth daily with food 90 tablet 3     hydrochlorothiazide (HYDRODIURIL) 25 MG tablet Take 1 tablet (25 mg) by mouth daily 30 tablet 11     fluconazole (DIFLUCAN) 200 MG tablet Take 1 tablet by mouth daily. 30 tablet 3       Exam:  /89  Wt Readings from Last 2 Encounters:   03/09/18 91.5 kg (201 lb 11.2 oz)   10/12/17 91.7 kg (202 lb 1.6 oz)       Physical Exam   Constitutional: He is  well-developed, well-nourished, and in no distress.   Cardiovascular: Normal rate and regular rhythm.    Pulmonary/Chest: Breath sounds normal.   Abdominal: Soft. Bowel sounds are normal.   Lymphadenopathy:     He has no cervical adenopathy.   Neurological: He is alert.   Psychiatric: Affect and judgment normal.     Assessment and Plan:  Zac Rosenthal is a 66 year old male who is here for followup on HIV treatment.    #HIV: Well controlled on Stribild. No missed doses. Will repeat HIV labs in 6 months, before his next appt    #HTN: The patient will call his PCP to address his elevated BP.    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 Anal Pap in Oct 2017 showed ASCUS.  Repeat anoscopy scheduled for April 2018.     #Colonic tubular adenoma polyp in April 2017.  Colonoscopy recommended after 5 years from then (4 more years).    Victor Hugo Dacosta MD  Professor of Medicine

## 2018-05-03 ENCOUNTER — OFFICE VISIT (OUTPATIENT)
Dept: SURGERY | Facility: CLINIC | Age: 68
End: 2018-05-03
Payer: COMMERCIAL

## 2018-05-03 VITALS
TEMPERATURE: 98.1 F | WEIGHT: 203.2 LBS | HEIGHT: 70 IN | SYSTOLIC BLOOD PRESSURE: 147 MMHG | BODY MASS INDEX: 29.09 KG/M2 | OXYGEN SATURATION: 98 % | DIASTOLIC BLOOD PRESSURE: 91 MMHG | HEART RATE: 82 BPM

## 2018-05-03 DIAGNOSIS — K62.82 ANAL DYSPLASIA: Primary | ICD-10-CM

## 2018-05-03 ASSESSMENT — PAIN SCALES - GENERAL: PAINLEVEL: NO PAIN (0)

## 2018-05-03 NOTE — NURSING NOTE
"Chief Complaint   Patient presents with     HRA     HRA       Vitals:    05/03/18 1138   BP: (!) 147/91   Pulse: 82   Temp: 98.1  F (36.7  C)   TempSrc: Oral   SpO2: 98%   Weight: 203 lb 3.2 oz   Height: 5' 10\"       Body mass index is 29.16 kg/(m^2).    Brea REVELES LPN                    "

## 2018-05-03 NOTE — PROGRESS NOTES
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 He was last seen in our clinic for HRA on 10/12/2017 with mild acetowhitening on exam and no biopsies at that time. Anal cytology on 10/12/17 showed ASCUS.    HPI: Zac Rosenthal is not a smoker. His HIV is under good control. He has no anorectal complaints and no other concerns today.   His underwent a colonoscopy on 17 with one tubular adenoma and repeat recommended after 5 years.    ASSESSMENT: Written, informed consent was obtained prior to procedure.  Prior to the start of the procedure and with procedural staff participation, I verbally confirmed the patient s identity using two indicators, relevant allergies, that the procedure was appropriate and matched the consent or emergent situation, and that the correct equipment/implants were available. Immediately prior to starting the procedure I conducted the Time Out with the procedural staff and re-confirmed the patient s name, procedure, and site/side. (The Joint Commission universal protocol was followed.)  Yes    Sedation (Moderate or Deep): None    Anal cytology was not obtained today. Digital anal rectal exam was performed without any palpable lesions. Dilute acetic acid soak was completed for 2 minutes. Lubricant was used to insert the anoscope. Performed high resolution microscopy using the colposcope. The dentate line was viewed in its entirety. There was one small area of bright acetowhitening with coarse punctation in the right lateral position at the dentate line. After injection with 1% lidocaine with epinephrine, biopsy was obtained at this site using a baby Tischler forceps. Entire lesion was destroyed with fulguration. Hemostasis was obtained using Monsel solution as this was overlying a hemorrhoid. The remainder of  the anal canal with diffuse mild acetowhitening with some fine punctation versus striated vessels.  The perianal area was inspected after acetic acid soak. Again some mild acetowhitening with striated vessels in the posterior but no bright acetowhitening, coarse punctation, or verruciform lesions.    PLAN: Will follow up with results of biopsy from today. If high grade dysplasia, this was destroyed completely today so would have him follow up in 3 months. If low grade or normal, follow up in 6 months.  Repeat colonoscopy in 2022.  Patient's questions were answered to his stated satisfaction and he is in agreement with this plan.    For details of past medical history, surgical history, family history, medications, allergies, and review of systems, please see details below.    Medical history:  No past medical history on file.    Surgical history:  No past surgical history on file.    Family history:  Family History   Problem Relation Age of Onset     Cervical Cancer Mother      Lymphoma Mother      Other Cancer Mother      Myocardial Infarction Father      HEART DISEASE Brother      Coronary Artery Disease Brother        Medications:  Current Outpatient Prescriptions   Medication Sig Dispense Refill     fluconazole (DIFLUCAN) 200 MG tablet Take 1 tablet by mouth daily. 30 tablet 3     ketoconazole (NIZORAL) 2 % cream Apply topically daily 15 g 11     lisinopril (PRINIVIL/ZESTRIL) 20 MG tablet Take 1 tablet (20 mg) by mouth daily 30 tablet 11     STRIBILD [phone] mg tablet Take 1 tablet by mouth daily with food 90 tablet 3     Allergies:  The patientis allergic to amoxicillin.    Social history:  Social History   Substance Use Topics     Smoking status: Never Smoker     Smokeless tobacco: Never Used     Alcohol use 0.0 oz/week     0 Standard drinks or equivalent per week      Comment: 3x yr     Marital status: single.    Review of Systems:  Nursing Notes:   Brea Morataya LPN  5/3/2018 11:42 AM  Signed  Chief  "Complaint   Patient presents with     HRA     HRA       Vitals:    05/03/18 1138   BP: (!) 147/91   Pulse: 82   Temp: 98.1  F (36.7  C)   TempSrc: Oral   SpO2: 98%   Weight: 203 lb 3.2 oz   Height: 5' 10\"       Body mass index is 29.16 kg/(m^2).    Brea REVELES LPN                       This procedure was performed under a collaborative agreement with Dr. Mikael Prabhakar MD, Chief of Colon and Rectal Surgery, [facility].    Rosangela Valentin, NP-C  Colon and Rectal Surgery  [facility]  "

## 2018-05-03 NOTE — NURSING NOTE
The following medication was given:     MEDICATION:  Lidocaine with epinephrine  ROUTE: SQ  SITE: Rectum   DOSE: 1% 30mL  LOT #: -[id number]  : Hospira  EXPIRATION DATE: 2/1/2019  NDC#: [id number]   Was there drug waste? Yes  Amount of drug waste (mL): 25.  Reason for waste:  Single use vial      Brea Morataya LPN  May 3, 2018

## 2018-05-03 NOTE — MR AVS SNAPSHOT
After Visit Summary   5/3/2018    Zac Rosenthal    MRN: [id number]           Patient Information     Date Of Birth          1950        Visit Information        Provider Department      5/3/2018 12:00 PM Rosangela Pritchett APRN [facility] Colon and Rectal Surgery        Today's Diagnoses     Anal dysplasia    -  1       Follow-ups after your visit        Your next 10 appointments already scheduled     Aug 30, 2018  3:00 PM CDT   LAB with  LAB   [facility] ([facility])    [address] [place] [address]  1st Floor  [facility] [address] [phone]           Please do not eat 10-12 hours before your appointment if you are coming in fasting for labs on lipids, cholesterol, or glucose (sugar). This does not apply to pregnant women. Water, hot tea and black coffee (with nothing added) are okay. Do not drink other fluids, diet soda or chew gum.            Sep 14, 2018  8:30 AM CDT   (Arrive by 8:15 AM)   Return Visit with Victor Hugo Dacosta MD   [facility] ([facility])    [address] [place] [address]  [address] [place] [address] [phone]              Who to contact     Please call your clinic at [phone] to:    Ask questions about your health    Make or cancel appointments    Discuss your medicines    Learn about your test results    Speak to your doctor            Additional Information About Your Visit        [email or web address] Information     My 1% gives you secure access to your electronic health record. If you see a primary care provider, you can also send messages to your care team and make appointments. If you have questions, please call your primary care clinic.  If you do not have a primary care provider, please call [phone] and they will assist you.      My 1% is an electronic gateway that provides easy, online access to your medical records. With My 1%, you can request a  "clinic appointment, read your test results, renew a prescription or communicate with your care team.     To access your existing account, please contact your [facility] or call [phone] for assistance.        Care EveryWhere ID     This is your Care EveryWhere ID. This could be used by other organizations to access your [facility] medical records  [id number]        Your Vitals Were     Pulse Temperature Height Pulse Oximetry BMI (Body Mass Index)       82 98.1  F (36.7  C) (Oral) 5' 10\" 98% 29.16 kg/m2        Blood Pressure from Last 3 Encounters:   05/03/18 (!) 147/91   03/09/18 143/82   03/09/18 179/89    Weight from Last 3 Encounters:   05/03/18 203 lb 3.2 oz   03/09/18 203 lb 1.6 oz   03/09/18 201 lb 11.2 oz              We Performed the Following     HC ANOSCOPY DX, HRA W/ BIOPSY(IES)     Surgical pathology exam        Primary Care Provider Office Phone # Fax #    Francis Wyatt Corodn [phone] [address] [facility] [address]        Equal Access to Services     [facility]: Hadii aad ku hadasho Sopatricia, waaxda luqadaha, qaybta kaalmada adeegyada, silverio yanez . So [facility] [phone].    ATENCIÓN: Si habla español, tiene a nicole disposición servicios gratuitos de asistencia lingüística. Llame al [phone].    We comply with applicable federal civil rights laws and [place] laws. We do not discriminate on the basis of race, color, national origin, age, disability, sex, sexual orientation, or gender identity.            Thank you!     Thank you for choosing [facility] COLON AND RECTAL SURGERY  for your care. Our goal is always to provide you with excellent care. Hearing back from our patients is one way we can continue to improve our services. Please take a few minutes to complete the written survey that you may receive in the mail after your visit with us. Thank you!             Your Updated Medication List - " Protect others around you: Learn how to safely use, store and throw away your medicines at www.disposemymeds.org.          This list is accurate as of 5/3/18 12:31 PM.  Always use your most recent med list.                   Brand Name Dispense Instructions for use Diagnosis    Babnajk-Vexxd-Czyzbyau-TenofDF [id number] MG per tablet    STRIBILD    90 tablet    Take 1 tablet by mouth daily with food    HIV (human immunodeficiency virus infection) (H)       fluconazole 200 MG tablet    DIFLUCAN    30 tablet    Take 1 tablet by mouth daily.    HIV (human immunodeficiency virus infection) (H)       ketoconazole 2 % cream    NIZORAL    15 g    Apply topically daily    HIV (human immunodeficiency virus infection) (H)       lisinopril 20 MG tablet    PRINIVIL/ZESTRIL    30 tablet    Take 1 tablet (20 mg) by mouth daily    Essential hypertension

## 2018-05-03 NOTE — LETTER
5/3/2018       RE: Zac Rosenthal  [address]   [facility] [address]     Dear Colleague,    Thank you for referring your patient, Zac Rosenthal, to the [facility] COLON AND RECTAL SURGERY at [facility]. Please see a copy of my visit note below.      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 He was last seen in our clinic for HRA on 10/12/2017 with mild acetowhitening on exam and no biopsies at that time. Anal cytology on 10/12/17 showed ASCUS.    HPI: Zac Rosenthal is not a smoker. His HIV is under good control. He has no anorectal complaints and no other concerns today.   His underwent a colonoscopy on 17 with one tubular adenoma and repeat recommended after 5 years.    ASSESSMENT: Written, informed consent was obtained prior to procedure.  Prior to the start of the procedure and with procedural staff participation, I verbally confirmed the patient s identity using two indicators, relevant allergies, that the procedure was appropriate and matched the consent or emergent situation, and that the correct equipment/implants were available. Immediately prior to starting the procedure I conducted the Time Out with the procedural staff and re-confirmed the patient s name, procedure, and site/side. (The Joint Commission universal protocol was followed.)  Yes    Sedation (Moderate or Deep): None    Anal cytology was not obtained today. Digital anal rectal exam was performed without any palpable lesions. Dilute acetic acid soak was completed for 2 minutes. Lubricant was used to insert the anoscope. Performed high resolution microscopy using the colposcope. The dentate line was viewed in its entirety. There was one small area of bright acetowhitening with coarse punctation in the right  lateral position at the dentate line. After injection with 1% lidocaine with epinephrine, biopsy was obtained at this site using a baby Tischler forceps. Entire lesion was destroyed with fulguration. Hemostasis was obtained using Monsel solution as this was overlying a hemorrhoid. The remainder of the anal canal with diffuse mild acetowhitening with some fine punctation versus striated vessels.  The perianal area was inspected after acetic acid soak. Again some mild acetowhitening with striated vessels in the posterior but no bright acetowhitening, coarse punctation, or verruciform lesions.    PLAN: Will follow up with results of biopsy from today. If high grade dysplasia, this was destroyed completely today so would have him follow up in 3 months. If low grade or normal, follow up in 6 months.  Repeat colonoscopy in 2022.  Patient's questions were answered to his stated satisfaction and he is in agreement with this plan.    For details of past medical history, surgical history, family history, medications, allergies, and review of systems, please see details below.    Medical history:  No past medical history on file.    Surgical history:  No past surgical history on file.    Family history:  Family History   Problem Relation Age of Onset     Cervical Cancer Mother      Lymphoma Mother      Other Cancer Mother      Myocardial Infarction Father      HEART DISEASE Brother      Coronary Artery Disease Brother        Medications:  Current Outpatient Prescriptions   Medication Sig Dispense Refill     fluconazole (DIFLUCAN) 200 MG tablet Take 1 tablet by mouth daily. 30 tablet 3     ketoconazole (NIZORAL) 2 % cream Apply topically daily 15 g 11     lisinopril (PRINIVIL/ZESTRIL) 20 MG tablet Take 1 tablet (20 mg) by mouth daily 30 tablet 11     STRIBILD [phone] mg tablet Take 1 tablet by mouth daily with food 90 tablet 3     Allergies:  The patientis allergic to amoxicillin.    Social history:  Social History  "  Substance Use Topics     Smoking status: Never Smoker     Smokeless tobacco: Never Used     Alcohol use 0.0 oz/week     0 Standard drinks or equivalent per week      Comment: 3x yr     Marital status: single.    Review of Systems:  Nursing Notes:   Brea Morataya LPN  5/3/2018 11:42 AM  Signed  Chief Complaint   Patient presents with     HRA     HRA       Vitals:    05/03/18 1138   BP: (!) 147/91   Pulse: 82   Temp: 98.1  F (36.7  C)   TempSrc: Oral   SpO2: 98%   Weight: 203 lb 3.2 oz   Height: 5' 10\"       Body mass index is 29.16 kg/(m^2).    Brea REVELES LPN      This procedure was performed under a collaborative agreement with Dr. Mikael Prabhakar MD, Chief of Colon and Rectal Surgery, [facility].    Again, thank you for allowing me to participate in the care of your patient.      Sincerely,    JULY Monson CNP      "

## 2018-05-04 LAB — COPATH REPORT: NORMAL

## 2018-05-07 ENCOUNTER — TELEPHONE (OUTPATIENT)
Dept: SURGERY | Facility: CLINIC | Age: 68
End: 2018-05-07

## 2018-05-07 ENCOUNTER — OFFICE VISIT (OUTPATIENT)
Dept: FAMILY MEDICINE | Facility: CLINIC | Age: 68
End: 2018-05-07
Payer: COMMERCIAL

## 2018-05-07 VITALS
WEIGHT: 205 LBS | RESPIRATION RATE: 14 BRPM | OXYGEN SATURATION: 98 % | SYSTOLIC BLOOD PRESSURE: 138 MMHG | BODY MASS INDEX: 29.35 KG/M2 | TEMPERATURE: 97.3 F | HEIGHT: 70 IN | HEART RATE: 61 BPM | DIASTOLIC BLOOD PRESSURE: 76 MMHG

## 2018-05-07 DIAGNOSIS — B36.9 DERMATITIS FUNGAL: ICD-10-CM

## 2018-05-07 DIAGNOSIS — Z21 HIV (HUMAN IMMUNODEFICIENCY VIRUS INFECTION) (H): ICD-10-CM

## 2018-05-07 DIAGNOSIS — I10 ESSENTIAL HYPERTENSION: Primary | ICD-10-CM

## 2018-05-07 PROCEDURE — 80048 BASIC METABOLIC PNL TOTAL CA: CPT | Performed by: FAMILY MEDICINE

## 2018-05-07 PROCEDURE — 36415 COLL VENOUS BLD VENIPUNCTURE: CPT | Performed by: FAMILY MEDICINE

## 2018-05-07 PROCEDURE — 99213 OFFICE O/P EST LOW 20 MIN: CPT | Performed by: FAMILY MEDICINE

## 2018-05-07 RX ORDER — FLUCONAZOLE 200 MG/1
TABLET ORAL
Qty: 30 TABLET | Refills: 3 | Status: SHIPPED | OUTPATIENT
Start: 2018-05-07 | End: 2019-08-19

## 2018-05-07 RX ORDER — KETOCONAZOLE 20 MG/G
CREAM TOPICAL DAILY
Qty: 15 G | Refills: 11 | Status: SHIPPED | OUTPATIENT
Start: 2018-05-07 | End: 2019-08-19

## 2018-05-07 NOTE — TELEPHONE ENCOUNTER
Called to discuss pathology showing AIN 2. This was fulgurated in clinic. Return for high resolution anoscopy in 3 months. Patient's questions were answered to his stated satisfaction and he is in agreement with this plan.    JULY Marquez, NP-C  Colon and Rectal Surgery  [facility]

## 2018-05-07 NOTE — PROGRESS NOTES
SUBJECTIVE:   Zac Rosenthal is a 67 year old male who presents to clinic today for the following health issues:      Hypertension Follow-up      Outpatient blood pressures are being checked at home 3-4x daily  Results are pt brought readings list.    Low Salt Diet: no added salt, low salt    Amount of exercise or physical activity: 6-7 days/week for an average of greater than 60 minutes    Problems taking medications regularly: No    Medication side effects: none    Diet: low salt,  Eats healthy    Felt lightheaded at first    Last time experiencing some dizziness - with the HCTZ, tried something different    Home BP monitoring looks good , mostly 110-120s systolic      CV: no palpitations, no chest pain, no lower extremity swelling.  Resp: no shortness of breath, wheezing, or cough.      I have reviewed and updated the patient's past medical history in the EMR. Current problems are:    Patient Active Problem List   Diagnosis     Human immunodeficiency virus (HIV) disease (H)     Anal dysplasia     Essential hypertension     History reviewed. No pertinent surgical history.    Social History   Substance Use Topics     Smoking status: Never Smoker     Smokeless tobacco: Never Used     Alcohol use 0.0 oz/week     0 Standard drinks or equivalent per week      Comment: 3x yr     Family History   Problem Relation Age of Onset     Cervical Cancer Mother      Lymphoma Mother      Other Cancer Mother      Myocardial Infarction Father      HEART DISEASE Brother      Coronary Artery Disease Brother          Allergies, reviewed:     Allergies   Allergen Reactions     Amoxicillin        Current Outpatient Prescriptions   Medication Sig Dispense Refill     fluconazole (DIFLUCAN) 200 MG tablet Take 1 tablet by mouth daily. 30 tablet 3     ketoconazole (NIZORAL) 2 % cream Apply topically daily 15 g 11     lisinopril (PRINIVIL/ZESTRIL) 20 MG tablet Take 1 tablet (20 mg) by mouth daily 30 tablet 11     STRIBILD [phone] mg  "tablet Take 1 tablet by mouth daily with food 90 tablet 3       Objective:  /76  Pulse 61  Temp 97.3  F (36.3  C) (Tympanic)  Resp 14  Ht 5' 10\" (1.778 m)  Wt 205 lb (93 kg)  SpO2 98%  BMI 29.41 kg/m2  General Appearance: Pleasant, alert, WN/WD in no acute respiratory distress.  Neurologic Exam: Nonfocal, no tremor. Normal gait.  Psychiatric Exam: Alert - appropriate, normal affect    ASSESSMENT/PLAN:    ICD-10-CM    1. Essential hypertension I10 Basic metabolic panel   2. HIV (human immunodeficiency virus infection) (H) B20 fluconazole (DIFLUCAN) 200 MG tablet     ketoconazole (NIZORAL) 2 % cream   3. Dermatitis fungal B36.9 fluconazole (DIFLUCAN) 200 MG tablet     ketoconazole (NIZORAL) 2 % cream      Visit was mainly counseling about age and essential hypertension  Home blood pressures have been in the 110-120s    Also following at the  for infections disease, though today he would like a refill on azole medication, sometimes he gets a rash/skin breakdown on his gluteal cleft and uses this for that    .Follow up: Return as needed if symptoms fail to improve    Francis Cordon MD MPH    "

## 2018-05-07 NOTE — NURSING NOTE
"Chief Complaint   Patient presents with     Hypertension     follow up       Initial /76  Pulse 61  Temp 97.3  F (36.3  C) (Tympanic)  Resp 14  Ht 5' 10\" (1.778 m)  Wt 205 lb (93 kg)  SpO2 98%  BMI 29.41 kg/m2 Estimated body mass index is 29.41 kg/(m^2) as calculated from the following:    Height as of this encounter: 5' 10\" (1.778 m).    Weight as of this encounter: 205 lb (93 kg).  BP completed using cuff size: large    Health Maintenance that is potentially due pending provider review:  Health Maintenance Due   Topic Date Due     ADVANCE DIRECTIVE PLANNING Q5 YRS  11/13/2004     AORTIC ANEURYSM SCREENING (SYSTEM ASSIGNED)  11/13/2014           "

## 2018-05-07 NOTE — MR AVS SNAPSHOT
After Visit Summary   5/7/2018    Zac Rosenthal    MRN: [id number]           Patient Information     Date Of Birth          1950        Visit Information        Provider Department      5/7/2018 12:00 PM Francis oCrdon MD [facility] [place]        Today's Diagnoses     Essential hypertension    -  1    HIV (human immunodeficiency virus infection) (H)        Dermatitis fungal           Follow-ups after your visit        Your next 10 appointments already scheduled     Aug 30, 2018  3:00 PM CDT   LAB with  LAB   [facility] ([facility])    [address]  1st Floor  [facility] [id number] [phone]           Please do not eat 10-12 hours before your appointment if you are coming in fasting for labs on lipids, cholesterol, or glucose (sugar). This does not apply to pregnant women. Water, hot tea and black coffee (with nothing added) are okay. Do not drink other fluids, diet soda or chew gum.            Sep 14, 2018  8:30 AM CDT   (Arrive by 8:15 AM)   Return Visit with Victor Hugo Dacosta MD   [facility] ([facility])    [address] [place] [address]  [address]  [facility] [id number] [phone]              Who to contact     If you have questions or need follow up information about today's clinic visit or your schedule please contact [facility] directly at [phone].  Normal or non-critical lab and imaging results will be communicated to you by MyChart, letter or phone within 4 business days after the clinic has received the results. If you do not hear from us within 7 days, please contact the clinic through MyChart or phone. If you have a critical or abnormal lab result, we will notify you by phone as soon as possible.  Submit refill requests through SMS Assist or call your pharmacy and they will forward the refill request to us. Please allow 3 business days for your  "refill to be completed.          Additional Information About Your Visit        [employer] Information     [employer] gives you secure access to your electronic health record. If you see a primary care provider, you can also send messages to your care team and make appointments. If you have questions, please call your primary care clinic.  If you do not have a primary care provider, please call [phone] and they will assist you.        Care EveryWhere ID     This is your Care EveryWhere ID. This could be used by other organizations to access your [place] medical records  [id number]        Your Vitals Were     Pulse Temperature Respirations Height Pulse Oximetry BMI (Body Mass Index)    61 97.3  F (36.3  C) (Tympanic) 14 5' 10\" (1.778 m) 98% 29.41 kg/m2       Blood Pressure from Last 3 Encounters:   05/07/18 138/76   05/03/18 (!) 147/91   03/09/18 143/82    Weight from Last 3 Encounters:   05/07/18 205 lb (93 kg)   05/03/18 203 lb 3.2 oz (92.2 kg)   03/09/18 203 lb 1.6 oz (92.1 kg)              We Performed the Following     Basic metabolic panel          Where to get your medicines      These medications were sent to [facility] [address] [place], [place] [id number]    Hours:  24-hours Phone:  [phone]     fluconazole 200 MG tablet    ketoconazole 2 % cream          Primary Care Provider Office Phone # Fax #    Francis Wyatt Cordon [phone] [address] [facility] [address]        Equal Access to Services     [facility] CONSTANCE : Hadii florentin Simth, waaxda lupanda, qaybta maryjoalsilverio ayoub. So [facility] [phone].    ATENCIÓN: Si habla español, tiene a nicole disposición servicios gratuitos de asistencia lingüística. Llame al [phone].    We comply with applicable federal civil rights laws and [place] laws. We do not discriminate on the basis of race, color, " national origin, age, disability, sex, sexual orientation, or gender identity.            Thank you!     Thank you for choosing [facility]  for your care. Our goal is always to provide you with excellent care. Hearing back from our patients is one way we can continue to improve our services. Please take a few minutes to complete the written survey that you may receive in the mail after your visit with us. Thank you!             Your Updated Medication List - Protect others around you: Learn how to safely use, store and throw away your medicines at www.disposemymeds.org.          This list is accurate as of 5/7/18 12:15 PM.  Always use your most recent med list.                   Brand Name Dispense Instructions for use Diagnosis    Ptcpcwo-Bygxp-Yaqcmbjn-TenofDF [phone] MG per tablet    STRIBILD    90 tablet    Take 1 tablet by mouth daily with food    HIV (human immunodeficiency virus infection) (H)       fluconazole 200 MG tablet    DIFLUCAN    30 tablet    Take 1 tablet by mouth daily.    HIV (human immunodeficiency virus infection) (H), Dermatitis fungal       ketoconazole 2 % cream    NIZORAL    15 g    Apply topically daily    HIV (human immunodeficiency virus infection) (H), Dermatitis fungal       lisinopril 20 MG tablet    PRINIVIL/ZESTRIL    30 tablet    Take 1 tablet (20 mg) by mouth daily    Essential hypertension

## 2018-05-08 LAB
ANION GAP SERPL CALCULATED.3IONS-SCNC: 9 MMOL/L (ref 3–14)
BUN SERPL-MCNC: 17 MG/DL (ref 7–30)
CALCIUM SERPL-MCNC: 9.1 MG/DL (ref 8.5–10.1)
CHLORIDE SERPL-SCNC: 104 MMOL/L (ref 94–109)
CO2 SERPL-SCNC: 24 MMOL/L (ref 20–32)
CREAT SERPL-MCNC: 0.8 MG/DL (ref 0.66–1.25)
GFR SERPL CREATININE-BSD FRML MDRD: >90 ML/MIN/1.7M2
GLUCOSE SERPL-MCNC: 93 MG/DL (ref 70–99)
POTASSIUM SERPL-SCNC: 4.6 MMOL/L (ref 3.4–5.3)
SODIUM SERPL-SCNC: 137 MMOL/L (ref 133–144)

## 2018-07-10 ENCOUNTER — TELEPHONE (OUTPATIENT)
Dept: SURGERY | Facility: CLINIC | Age: 68
End: 2018-07-10

## 2018-07-10 NOTE — TELEPHONE ENCOUNTER
Left VM for pt to return my call to schedule follow-up (anal microscopy) appt w/ Rosangela Valentin (Colorectal).  Amisha [phone]

## 2018-08-08 ENCOUNTER — TELEPHONE (OUTPATIENT)
Dept: SURGERY | Facility: CLINIC | Age: 68
End: 2018-08-08

## 2018-08-08 NOTE — TELEPHONE ENCOUNTER
Patient answered phone call and details of future appointment were discussed. The appointment was confirmed and the patient agreed to details.   Yadiel Sebastian, EMT

## 2018-08-09 ENCOUNTER — OFFICE VISIT (OUTPATIENT)
Dept: SURGERY | Facility: CLINIC | Age: 68
End: 2018-08-09
Payer: COMMERCIAL

## 2018-08-09 VITALS
TEMPERATURE: 98.2 F | WEIGHT: 200.1 LBS | HEIGHT: 70 IN | SYSTOLIC BLOOD PRESSURE: 145 MMHG | HEART RATE: 59 BPM | DIASTOLIC BLOOD PRESSURE: 84 MMHG | BODY MASS INDEX: 28.65 KG/M2

## 2018-08-09 DIAGNOSIS — K62.82 ANAL DYSPLASIA: Primary | ICD-10-CM

## 2018-08-09 NOTE — NURSING NOTE
The following medication was given:     MEDICATION:  Xylocaine with epinephrine  ROUTE: SQ  SITE: Anal canal  DOSE: 4 mL of 10 mg/mL xylocaine with 1% epinephrine  LOT #: [id number]  : Therasis  EXPIRATION DATE: 03/2020  NDC#: 85387-003-07   Was there drug waste? Yes  Amount of drug waste (mL): 26.  Reason for waste:  Single use vial  Multi-dose vial: No    BRYAN Bashir  August 9, 2018

## 2018-08-09 NOTE — MR AVS SNAPSHOT
After Visit Summary   8/9/2018    Zac Rosenthal    MRN: [id number]           Patient Information     Date Of Birth          1950        Visit Information        Provider Department      8/9/2018 1:00 PM Rosangela Pritchett APRN [facility] Colon and Rectal Surgery        Today's Diagnoses     Anal dysplasia    -  1       Follow-ups after your visit        Your next 10 appointments already scheduled     Aug 23, 2018  3:00 PM CDT   LAB with  LAB   [facility] ([facility])    [address] [place] [address]  1st Floor  [facility] [address] [phone]           Please do not eat 10-12 hours before your appointment if you are coming in fasting for labs on lipids, cholesterol, or glucose (sugar). This does not apply to pregnant women. Water, hot tea and black coffee (with nothing added) are okay. Do not drink other fluids, diet soda or chew gum.            Sep 07, 2018 10:30 AM CDT   (Arrive by 10:15 AM)   Return Visit with Victor Hugo Dacosta MD   [facility] and Infectious Diseases ([facility])    [address] [place] [address]  [address] [place] [address] [phone]              Who to contact     Please call your clinic at [phone] to:    Ask questions about your health    Make or cancel appointments    Discuss your medicines    Learn about your test results    Speak to your doctor            Additional Information About Your Visit        [employer] Information     [employer] gives you secure access to your electronic health record. If you see a primary care provider, you can also send messages to your care team and make appointments. If you have questions, please call your primary care clinic.  If you do not have a primary care provider, please call [phone] and they will assist you.      [employer] is an electronic gateway that provides easy, online access to your medical records. With [employer], you can request a  "clinic appointment, read your test results, renew a prescription or communicate with your care team.     To access your existing account, please contact your [facility] or call [phone] for assistance.        Care EveryWhere ID     This is your Care EveryWhere ID. This could be used by other organizations to access your [place] medical records  [id number]        Your Vitals Were     Pulse Temperature Height BMI (Body Mass Index)          59 98.2  F (36.8  C) (Oral) 5' 10\" 28.71 kg/m2         Blood Pressure from Last 3 Encounters:   08/09/18 145/84   05/07/18 138/76   05/03/18 (!) 147/91    Weight from Last 3 Encounters:   08/09/18 200 lb 1.6 oz   05/07/18 205 lb   05/03/18 203 lb 3.2 oz              We Performed the Following     Cytology non gyn (Anal PAP)     High Resolution Anoscopy (with fulguration)     Surgical pathology exam        Primary Care Provider Office Phone # Fax #    Francis Wyatt Cordon [phone] [address] [facility] [address]        Equal Access to Services     [facility] : Hadii aad ku hadasho Soomaali, waaxda luqadaha, qaybta kaalmada adeegyada, silverio yanez . So [facility] [phone].    ATENCIÓN: Si habla español, tiene a nicole disposición servicios gratuitos de asistencia lingüística. [facility] [phone].    We comply with applicable federal civil rights laws and [place] laws. We do not discriminate on the basis of race, color, national origin, age, disability, sex, sexual orientation, or gender identity.            Thank you!     Thank you for choosing [facility] COLON AND RECTAL SURGERY  for your care. Our goal is always to provide you with excellent care. Hearing back from our patients is one way we can continue to improve our services. Please take a few minutes to complete the written survey that you may receive in the mail after your visit with us. Thank you!             Your Updated " Medication List - Protect others around you: Learn how to safely use, store and throw away your medicines at www.disposemymeds.org.          This list is accurate as of 8/9/18  1:43 PM.  Always use your most recent med list.                   Brand Name Dispense Instructions for use Diagnosis    Hqkimjp-Chlcr-Emeatscr-TenofDF [phone] MG per tablet    STRIBILD    90 tablet    Take 1 tablet by mouth daily with food    HIV (human immunodeficiency virus infection) (H)       fluconazole 200 MG tablet    DIFLUCAN    30 tablet    Take 1 tablet by mouth daily.    HIV (human immunodeficiency virus infection) (H), Dermatitis fungal       ketoconazole 2 % cream    NIZORAL    15 g    Apply topically daily    HIV (human immunodeficiency virus infection) (H), Dermatitis fungal       lisinopril 20 MG tablet    PRINIVIL/ZESTRIL    30 tablet    Take 1 tablet (20 mg) by mouth daily    Essential hypertension

## 2018-08-09 NOTE — LETTER
2018       RE: Zac Rosenthal  [address]  [facility] [address]     Dear Colleague,    Thank you for referring your patient, Zac Rosenthal, to the [facility] COLON AND RECTAL SURGERY at [facility]. Please see a copy of my visit note below.      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 He was last seen in our clinic for HRA on 5/3/2018 with biopsy showing AIN 2, which was fulgurated in clinic. Anal cytology on 10/12/17 showed ASCUS.    HPI: Zac Rosenthal is not a smoker. His HIV is under good control. He has no anorectal complaints and no other concerns today.   His underwent a colonoscopy on 17 with one tubular adenoma and repeat recommended after 5 years. His brother was just diagnosed with bladder cancer. Mother had lymphoma.     ASSESSMENT: Written, informed consent was obtained prior to procedure.  Prior to the start of the procedure and with procedural staff participation, I verbally confirmed the patient s identity using two indicators, relevant allergies, that the procedure was appropriate and matched the consent or emergent situation, and that the correct equipment/implants were available. Immediately prior to starting the procedure I conducted the Time Out with the procedural staff and re-confirmed the patient s name, procedure, and site/side. (The Joint Commission universal protocol was followed.)  Yes    Sedation (Moderate or Deep): None    Anal cytology was obtained today using a Dacron swab. Digital anal rectal exam was performed without any palpable lesions. Dilute acetic acid soak was completed for 2 minutes. Lubricant was used to insert the anoscope. Performed high resolution microscopy using the colposcope. The dentate line was viewed in its entirety.  There was a larger area of bright acetowhitening with punctation in the distal anal canal in the left posterior and posterior positions. After injection with 1% lidocaine with epinephrine, biopsy was obtained at this site using a baby Tischler forceps. Entire lesion was destroyed with fulguration. Hemostasis was obtained.   The perianal area was inspected after acetic acid soak without bright acetowhitening, coarse punctation, or verruciform lesions.    PLAN: Will follow up with results of biopsy from today. If high grade dysplasia, this was destroyed completely today so would have him follow up in 3 months. If low grade or normal, follow up in 6 months.  Repeat colonoscopy in 2022.  Patient's questions were answered to his stated satisfaction and he is in agreement with this plan.    For details of past medical history, surgical history, family history, medications, allergies, and review of systems, please see details below.    Medical history:  No past medical history on file.    Surgical history:  No past surgical history on file.    Family history:  Family History   Problem Relation Age of Onset     Cervical Cancer Mother      Lymphoma Mother      Other Cancer Mother      Myocardial Infarction Father      HEART DISEASE Brother      Coronary Artery Disease Brother        Medications:  Current Outpatient Prescriptions   Medication Sig Dispense Refill     fluconazole (DIFLUCAN) 200 MG tablet Take 1 tablet by mouth daily. 30 tablet 3     ketoconazole (NIZORAL) 2 % cream Apply topically daily 15 g 11     lisinopril (PRINIVIL/ZESTRIL) 20 MG tablet Take 1 tablet (20 mg) by mouth daily 30 tablet 11     STRIBILD [phone] mg tablet Take 1 tablet by mouth daily with food 90 tablet 3     Allergies:  The patientis allergic to amoxicillin.    Social history:  Social History   Substance Use Topics     Smoking status: Never Smoker     Smokeless tobacco: Never Used     Alcohol use 0.0 oz/week     0 Standard drinks or  equivalent per week      Comment: 3x yr     Marital status: single.    Review of Systems:  There are no exam notes on file for this visit.     This procedure was performed under a collaborative agreement with Dr. Mikael Prabhakar MD, Chief of Colon and Rectal Surgery, [facility].      Again, thank you for allowing me to participate in the care of your patient.      Sincerely,    JULY Monson CNP

## 2018-08-09 NOTE — PROGRESS NOTES
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 He was last seen in our clinic for HRA on 5/3/2018 with biopsy showing AIN 2, which was fulgurated in clinic. Anal cytology on 10/12/17 showed ASCUS.    HPI: Zac Rosenthal is not a smoker. His HIV is under good control. He has no anorectal complaints and no other concerns today.   His underwent a colonoscopy on 17 with one tubular adenoma and repeat recommended after 5 years. His brother was just diagnosed with bladder cancer. Mother had lymphoma.     ASSESSMENT: Written, informed consent was obtained prior to procedure.  Prior to the start of the procedure and with procedural staff participation, I verbally confirmed the patient s identity using two indicators, relevant allergies, that the procedure was appropriate and matched the consent or emergent situation, and that the correct equipment/implants were available. Immediately prior to starting the procedure I conducted the Time Out with the procedural staff and re-confirmed the patient s name, procedure, and site/side. (The Joint Commission universal protocol was followed.)  Yes    Sedation (Moderate or Deep): None    Anal cytology was obtained today using a Dacron swab. Digital anal rectal exam was performed without any palpable lesions. Dilute acetic acid soak was completed for 2 minutes. Lubricant was used to insert the anoscope. Performed high resolution microscopy using the colposcope. The dentate line was viewed in its entirety. There was a larger area of bright acetowhitening with punctation in the distal anal canal in the left posterior and posterior positions. After injection with 1% lidocaine with epinephrine, biopsy was obtained at this site using a baby Tischler forceps. Entire lesion was destroyed with fulguration.  Hemostasis was obtained.   The perianal area was inspected after acetic acid soak without bright acetowhitening, coarse punctation, or verruciform lesions.    PLAN: Will follow up with results of biopsy from today. If high grade dysplasia, this was destroyed completely today so would have him follow up in 3 months. If low grade or normal, follow up in 6 months.  Repeat colonoscopy in 2022.  Patient's questions were answered to his stated satisfaction and he is in agreement with this plan.    For details of past medical history, surgical history, family history, medications, allergies, and review of systems, please see details below.    Medical history:  No past medical history on file.    Surgical history:  No past surgical history on file.    Family history:  Family History   Problem Relation Age of Onset     Cervical Cancer Mother      Lymphoma Mother      Other Cancer Mother      Myocardial Infarction Father      HEART DISEASE Brother      Coronary Artery Disease Brother        Medications:  Current Outpatient Prescriptions   Medication Sig Dispense Refill     fluconazole (DIFLUCAN) 200 MG tablet Take 1 tablet by mouth daily. 30 tablet 3     ketoconazole (NIZORAL) 2 % cream Apply topically daily 15 g 11     lisinopril (PRINIVIL/ZESTRIL) 20 MG tablet Take 1 tablet (20 mg) by mouth daily 30 tablet 11     STRIBILD [phone] mg tablet Take 1 tablet by mouth daily with food 90 tablet 3     Allergies:  The patientis allergic to amoxicillin.    Social history:  Social History   Substance Use Topics     Smoking status: Never Smoker     Smokeless tobacco: Never Used     Alcohol use 0.0 oz/week     0 Standard drinks or equivalent per week      Comment: 3x yr     Marital status: single.    Review of Systems:  There are no exam notes on file for this visit.     This procedure was performed under a collaborative agreement with Dr. Mikael Prabhakar MD, Chief of Colon and Rectal Surgery, [facility]  [facility]icians.    Rosangela Valentin NP-C  Colon and Rectal Surgery  [facility]

## 2018-08-09 NOTE — NURSING NOTE
"Chief Complaint   Patient presents with     HRA       Vitals:    08/09/18 1248   BP: 145/84   BP Location: Left arm   Patient Position: Sitting   Cuff Size: Adult Regular   Pulse: 59   Temp: 98.2  F (36.8  C)   TempSrc: Oral   Weight: 200 lb 1.6 oz   Height: 5' 10\"       Body mass index is 28.71 kg/(m^2).      Yadiel Sebastian, EMT                      "

## 2018-08-10 LAB
COPATH REPORT: NORMAL
COPATH REPORT: NORMAL

## 2018-08-13 ENCOUNTER — TELEPHONE (OUTPATIENT)
Dept: SURGERY | Facility: CLINIC | Age: 68
End: 2018-08-13

## 2018-08-13 NOTE — TELEPHONE ENCOUNTER
Called to discuss biopsy results showing low grade dysplasia. Recommend repeat high resolution anoscopy in 6 months. Left a message.    JULY Marquez, NP-C  Colon and Rectal Surgery  [facility]

## 2018-08-23 DIAGNOSIS — B20 HUMAN IMMUNODEFICIENCY VIRUS (HIV) DISEASE (H): ICD-10-CM

## 2018-08-23 LAB
ALBUMIN SERPL-MCNC: 3.8 G/DL (ref 3.4–5)
ALP SERPL-CCNC: 87 U/L (ref 40–150)
ALT SERPL W P-5'-P-CCNC: 23 U/L (ref 0–70)
AMYLASE SERPL-CCNC: 81 U/L (ref 30–110)
ANION GAP SERPL CALCULATED.3IONS-SCNC: 9 MMOL/L (ref 3–14)
AST SERPL W P-5'-P-CCNC: 24 U/L (ref 0–45)
BASOPHILS # BLD AUTO: 0 10E9/L (ref 0–0.2)
BASOPHILS NFR BLD AUTO: 0.5 %
BILIRUB SERPL-MCNC: 0.8 MG/DL (ref 0.2–1.3)
BUN SERPL-MCNC: 9 MG/DL (ref 7–30)
CALCIUM SERPL-MCNC: 8.9 MG/DL (ref 8.5–10.1)
CHLORIDE SERPL-SCNC: 99 MMOL/L (ref 94–109)
CO2 SERPL-SCNC: 25 MMOL/L (ref 20–32)
CREAT SERPL-MCNC: 0.84 MG/DL (ref 0.66–1.25)
DIFFERENTIAL METHOD BLD: NORMAL
EOSINOPHIL # BLD AUTO: 0.4 10E9/L (ref 0–0.7)
EOSINOPHIL NFR BLD AUTO: 4.4 %
ERYTHROCYTE [DISTWIDTH] IN BLOOD BY AUTOMATED COUNT: 11.9 % (ref 10–15)
GFR SERPL CREATININE-BSD FRML MDRD: >90 ML/MIN/1.7M2
GLUCOSE SERPL-MCNC: 83 MG/DL (ref 70–99)
HCT VFR BLD AUTO: 47.3 % (ref 40–53)
HGB BLD-MCNC: 16.8 G/DL (ref 13.3–17.7)
IMM GRANULOCYTES # BLD: 0 10E9/L (ref 0–0.4)
IMM GRANULOCYTES NFR BLD: 0.3 %
LYMPHOCYTES # BLD AUTO: 3.2 10E9/L (ref 0.8–5.3)
LYMPHOCYTES NFR BLD AUTO: 37.2 %
MCH RBC QN AUTO: 33 PG (ref 26.5–33)
MCHC RBC AUTO-ENTMCNC: 35.5 G/DL (ref 31.5–36.5)
MCV RBC AUTO: 93 FL (ref 78–100)
MONOCYTES # BLD AUTO: 0.8 10E9/L (ref 0–1.3)
MONOCYTES NFR BLD AUTO: 9.4 %
NEUTROPHILS # BLD AUTO: 4.1 10E9/L (ref 1.6–8.3)
NEUTROPHILS NFR BLD AUTO: 48.2 %
NRBC # BLD AUTO: 0 10*3/UL
NRBC BLD AUTO-RTO: 0 /100
PLATELET # BLD AUTO: 269 10E9/L (ref 150–450)
POTASSIUM SERPL-SCNC: 3.9 MMOL/L (ref 3.4–5.3)
PROT SERPL-MCNC: 7.1 G/DL (ref 6.8–8.8)
RBC # BLD AUTO: 5.09 10E12/L (ref 4.4–5.9)
SODIUM SERPL-SCNC: 132 MMOL/L (ref 133–144)
WBC # BLD AUTO: 8.6 10E9/L (ref 4–11)

## 2018-08-24 LAB
CD3 CELLS # BLD: 2098 CELLS/UL (ref 603–2990)
CD3 CELLS NFR BLD: 70 % (ref 49–84)
CD3+CD4+ CELLS # BLD: 1014 CELLS/UL (ref 441–2156)
CD3+CD4+ CELLS NFR BLD: 34 % (ref 28–63)
CD3+CD4+ CELLS/CD3+CD8+ CLL BLD: 0.97 % (ref 1.4–2.6)
CD3+CD8+ CELLS # BLD: 1066 CELLS/UL (ref 125–1312)
CD3+CD8+ CELLS NFR BLD: 35 % (ref 10–40)
HIV1 RNA # PLAS NAA DL=20: <20 {COPIES}/ML
HIV1 RNA SERPL NAA+PROBE-LOG#: <1.3 {LOG_COPIES}/ML
IFC SPECIMEN: ABNORMAL

## 2018-09-07 ENCOUNTER — OFFICE VISIT (OUTPATIENT)
Dept: INFECTIOUS DISEASES | Facility: CLINIC | Age: 68
End: 2018-09-07
Attending: INTERNAL MEDICINE
Payer: COMMERCIAL

## 2018-09-07 VITALS
SYSTOLIC BLOOD PRESSURE: 156 MMHG | HEIGHT: 71 IN | WEIGHT: 201.5 LBS | DIASTOLIC BLOOD PRESSURE: 85 MMHG | HEART RATE: 85 BPM | TEMPERATURE: 98.1 F | BODY MASS INDEX: 28.21 KG/M2

## 2018-09-07 DIAGNOSIS — Z21 HIV (HUMAN IMMUNODEFICIENCY VIRUS INFECTION) (H): ICD-10-CM

## 2018-09-07 PROCEDURE — G0463 HOSPITAL OUTPT CLINIC VISIT: HCPCS | Mod: ZF

## 2018-09-07 ASSESSMENT — PAIN SCALES - GENERAL: PAINLEVEL: NO PAIN (0)

## 2018-09-07 NOTE — LETTER
9/7/2018      RE: Zac Rosenthal  [address]  [facility] [address]       Infectious Disease Clinic  HIV Follow Up Visit    HPI:  Zac Rosenthal is a 66 year old male who is here for follow-up on HIV treatment.  He is currently taking Stribild once a day without missing doses and no side effects.  He feels well and has no concerns.     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 Also had a colonoscopy which showed a tubular adenoma in April 2017.    He established primary care with family medicine, Dr. Francis Cordon, who is treating him with lisinopril for hypertension.    ROS:   Eyes: negative, visual blurring, double vision  Ears/Nose/Throat: negative, hearing loss, vertigo  Respiratory: No shortness of breath, dyspnea on exertion, cough, or hemoptysis  Cardiovascular: negative, tachycardia, irregular heart beat, chest pain and exertional chest pain or pressure  Gastrointestinal: negative, poor appetite, nausea, vomiting, abdominal pain, constipation and diarrhea  Genitourinary: negative, frequency, urgency and hematuria  Musculoskeletal: negative, joint pain and joint swelling  Neurologic: negative, headaches, syncope, numbness or tingling of hands and numbness or tingling of feet  Hematologic/Lymphatic/Immunologic: negative, chills and fever      Medications:  Current Outpatient Prescriptions   Medication Sig Dispense Refill     fluconazole (DIFLUCAN) 200 MG tablet Take 1 tablet by mouth daily. 30 tablet 3     ketoconazole (NIZORAL) 2 % cream Apply topically daily 15 g 11     lisinopril (PRINIVIL/ZESTRIL) 20 MG tablet Take 1 tablet (20 mg) by mouth daily 30 tablet 11     STRIBILD [id number] mg tablet Take 1 tablet by mouth daily with food 90 tablet 3       Exam:  /89  Wt Readings from Last 2 Encounters:   09/07/18 91.4 kg (201 lb 8 oz)   08/09/18 90.8 kg (200 lb 1.6 oz)       Physical Exam    Constitutional: He is well-developed, well-nourished, and in no distress.   Cardiovascular: Normal rate and regular rhythm.    Pulmonary/Chest: Breath sounds normal.   Abdominal: Soft. Bowel sounds are normal.   Lymphadenopathy:     He has no cervical adenopathy.   Neurological: He is alert.   Psychiatric: Affect and judgment normal.         Assessment and Plan:  Zac Rosenthal is a 66 year old male who is here for followup on HIV treatment.    #HIV: Well controlled on Stribild. No missed doses. Will repeat HIV labs in 6 months, before his next appt    #HTN: The patient will work his PCP to address his elevated BP.    #Anal dysplasia: Repeat anoscopy scheduled for Feb 2019.     #Colonic tubular adenoma polyp in April 2017.  Colonoscopy recommended after 5 years from then (4 more years).      Infectious Disease Clinic  HIV Follow Up Visit    HPI:  Zac Rosenthal is a 66 year old male who is here for follow-up on HIV treatment.  He is currently taking Stribild once a day without missing doses and no side effects.  He feels well and has no concerns.     Had a high resolution anoscopy in May 2018 and August 2018.  Also had a colonoscopy which showed a tubular adenoma in April 2017.    He established primary care with family medicine, Dr. Francis Cordon, who started hum on lisinpril for hypertension.    ROS:   Eyes: negative, visual blurring, double vision  Ears/Nose/Throat: negative, hearing loss, vertigo  Respiratory: No shortness of breath, dyspnea on exertion, cough, or hemoptysis  Cardiovascular: negative, tachycardia, irregular heart beat, chest pain and exertional chest pain or pressure  Gastrointestinal: negative, poor appetite, nausea, vomiting, abdominal pain, constipation and diarrhea  Genitourinary: negative, frequency, urgency and hematuria  Musculoskeletal: negative, joint pain and joint swelling  Neurologic: negative, headaches, syncope, numbness or tingling of hands and numbness or tingling of  feet  Hematologic/Lymphatic/Immunologic: negative, chills and fever      Medications:  Current Outpatient Prescriptions   Medication Sig Dispense Refill     fluconazole (DIFLUCAN) 200 MG tablet Take 1 tablet by mouth daily. 30 tablet 3     ketoconazole (NIZORAL) 2 % cream Apply topically daily 15 g 11     lisinopril (PRINIVIL/ZESTRIL) 20 MG tablet Take 1 tablet (20 mg) by mouth daily 30 tablet 11     STRIBILD [id number] mg tablet Take 1 tablet by mouth daily with food 90 tablet 3       Exam:  /89  Wt Readings from Last 2 Encounters:   09/07/18 91.4 kg (201 lb 8 oz)   08/09/18 90.8 kg (200 lb 1.6 oz)       Physical Exam   Constitutional: He is well-developed, well-nourished, and in no distress.   Cardiovascular: Normal rate and regular rhythm.    Pulmonary/Chest: Breath sounds normal.   Abdominal: Soft. Bowel sounds are normal.   Lymphadenopathy:     He has no cervical adenopathy.   Neurological: He is alert.   Psychiatric: Affect and judgment normal.         Assessment and Plan:  Zac Rosenthal is a 66 year old male who is here for followup on HIV treatment.    #HIV: Well controlled on Stribild. No missed doses. Will repeat HIV labs in 6 months, before his next appt    #HTN: The patient will follow up with his PCP to address his elevated BP. Continue lisinopril.    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 He will follow-up again in Feb 2019.    #Colonic tubular adenoma polyp in April 2017.  Colonoscopy recommended after 5 years from then (4 more years).      Victor Hugo Dacosta MD

## 2018-09-07 NOTE — PROGRESS NOTES
Infectious Disease Clinic  HIV Follow Up Visit    HPI:  Zac Rosenthal is a 66 year old male who is here for follow-up on HIV treatment.  He is currently taking Stribild once a day without missing doses and no side effects.  He feels well and has no concerns.     Had a high resolution anoscopy in May 2018 and August 2018.  Also had a colonoscopy which showed a tubular adenoma in April 2017.    He established primary care with family medicine, Dr. Francis Cordon, who started hum on lisinpril for hypertension.    ROS:   Eyes: negative, visual blurring, double vision  Ears/Nose/Throat: negative, hearing loss, vertigo  Respiratory: No shortness of breath, dyspnea on exertion, cough, or hemoptysis  Cardiovascular: negative, tachycardia, irregular heart beat, chest pain and exertional chest pain or pressure  Gastrointestinal: negative, poor appetite, nausea, vomiting, abdominal pain, constipation and diarrhea  Genitourinary: negative, frequency, urgency and hematuria  Musculoskeletal: negative, joint pain and joint swelling  Neurologic: negative, headaches, syncope, numbness or tingling of hands and numbness or tingling of feet  Hematologic/Lymphatic/Immunologic: negative, chills and fever      Medications:  Current Outpatient Prescriptions   Medication Sig Dispense Refill     fluconazole (DIFLUCAN) 200 MG tablet Take 1 tablet by mouth daily. 30 tablet 3     ketoconazole (NIZORAL) 2 % cream Apply topically daily 15 g 11     lisinopril (PRINIVIL/ZESTRIL) 20 MG tablet Take 1 tablet (20 mg) by mouth daily 30 tablet 11     STRIBILD [phone] mg tablet Take 1 tablet by mouth daily with food 90 tablet 3       Exam:  /89  Wt Readings from Last 2 Encounters:   09/07/18 91.4 kg (201 lb 8 oz)   08/09/18 90.8 kg (200 lb 1.6 oz)       Physical Exam   Constitutional: He is well-developed, well-nourished, and in no distress.   Cardiovascular: Normal rate and regular rhythm.    Pulmonary/Chest: Breath sounds normal.    Abdominal: Soft. Bowel sounds are normal.   Lymphadenopathy:     He has no cervical adenopathy.   Neurological: He is alert.   Psychiatric: Affect and judgment normal.         Assessment and Plan:  Zac Rosenthal is a 66 year old male who is here for followup on HIV treatment.    #HIV: Well controlled on Stribild. No missed doses. Will repeat HIV labs in 6 months, before his next appt    #HTN: The patient will follow up with his PCP to address his elevated BP. Continue lisinopril.    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 He will follow-up again in Feb 2019.    #Colonic tubular adenoma polyp in April 2017.  Colonoscopy recommended after 5 years from then (4 more years).      Victor Hugo Dacosta MD  Professor of Medicine

## 2018-09-07 NOTE — PROGRESS NOTES
Infectious Disease Clinic  HIV Follow Up Visit    HPI:  Zac Rosenthal is a 66 year old male who is here for follow-up on HIV treatment.  He is currently taking Stribild once a day without missing doses and no side effects.  He feels well and has no concerns.     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 Also had a colonoscopy which showed a tubular adenoma in April 2017.    He established primary care with family medicine, Dr. Francis Cordon, who is treating him with lisinopril for hypertension.    ROS:   Eyes: negative, visual blurring, double vision  Ears/Nose/Throat: negative, hearing loss, vertigo  Respiratory: No shortness of breath, dyspnea on exertion, cough, or hemoptysis  Cardiovascular: negative, tachycardia, irregular heart beat, chest pain and exertional chest pain or pressure  Gastrointestinal: negative, poor appetite, nausea, vomiting, abdominal pain, constipation and diarrhea  Genitourinary: negative, frequency, urgency and hematuria  Musculoskeletal: negative, joint pain and joint swelling  Neurologic: negative, headaches, syncope, numbness or tingling of hands and numbness or tingling of feet  Hematologic/Lymphatic/Immunologic: negative, chills and fever      Medications:  Current Outpatient Prescriptions   Medication Sig Dispense Refill     fluconazole (DIFLUCAN) 200 MG tablet Take 1 tablet by mouth daily. 30 tablet 3     ketoconazole (NIZORAL) 2 % cream Apply topically daily 15 g 11     lisinopril (PRINIVIL/ZESTRIL) 20 MG tablet Take 1 tablet (20 mg) by mouth daily 30 tablet 11     STRIBILD [phone] mg tablet Take 1 tablet by mouth daily with food 90 tablet 3       Exam:  /89  Wt Readings from Last 2 Encounters:   09/07/18 91.4 kg (201 lb 8 oz)   08/09/18 90.8 kg (200 lb 1.6 oz)       Physical Exam   Constitutional: He is well-developed, well-nourished, and in no distress.   Cardiovascular: Normal  rate and regular rhythm.    Pulmonary/Chest: Breath sounds normal.   Abdominal: Soft. Bowel sounds are normal.   Lymphadenopathy:     He has no cervical adenopathy.   Neurological: He is alert.   Psychiatric: Affect and judgment normal.         Assessment and Plan:  Zac Rosenthal is a 66 year old male who is here for followup on HIV treatment.    #HIV: Well controlled on Stribild. No missed doses. Will repeat HIV labs in 6 months, before his next appt    #HTN: The patient will work his PCP to address his elevated BP.    #Anal dysplasia: Repeat anoscopy scheduled for Feb 2019.     #Colonic tubular adenoma polyp in April 2017.  Colonoscopy recommended after 5 years from then (4 more years).      Victor Hugo Dacosta MD  Professor of Medicine

## 2018-09-07 NOTE — NURSING NOTE
"/85  Pulse 85  Temp 98.1  F (36.7  C) (Oral)  Ht 1.803 m (5' 11\")  Wt 91.4 kg (201 lb 8 oz)  BMI 28.1 kg/m2  Chief Complaint   Patient presents with     RECHECK     follow up with B20, tzimmer cma       "

## 2018-09-07 NOTE — MR AVS SNAPSHOT
After Visit Summary   9/7/2018    Zac Rosenthal    MRN: [id number]           Patient Information     Date Of Birth          1950        Visit Information        Provider Department      9/7/2018 10:30 AM Victor Hugo Dacosta MD [facility] and Infectious Diseases        Today's Diagnoses     HIV (human immunodeficiency virus infection) (H)           Follow-ups after your visit        Follow-up notes from your care team     Return in about 6 months (around 3/7/2019).      Your next 10 appointments already scheduled     Mar 01, 2019  1:30 PM CST   LAB with  LAB   [facility])    [address]  1st Floor  [facility] [id number] [phone]           Please do not eat 10-12 hours before your appointment if you are coming in fasting for labs on lipids, cholesterol, or glucose (sugar). This does not apply to pregnant women. Water, hot tea and black coffee (with nothing added) are okay. Do not drink other fluids, diet soda or chew gum.            Mar 15, 2019  9:00 AM CDT   (Arrive by 8:45 AM)   Return Visit with Victor Hugo Dacosta MD   [facility] and Infectious Diseases ([facility])    [address] [place] [address]  [address]  [facility] [id number] [phone]              Future tests that were ordered for you today     Open Future Orders        Priority Expected Expires Ordered    CBC with platelets differential Routine 2/7/2019 4/7/2019 9/7/2018    HIV-1 RNA quantitative Routine 2/7/2019 4/7/2019 9/7/2018    T cell subset profile Routine 2/7/2019 4/7/2019 9/7/2018    Comprehensive metabolic panel Routine 2/7/2019 4/7/2019 9/7/2018    Lipid Profile Routine 2/7/2019 4/7/2019 9/7/2018            Who to contact     If you have questions or need follow up information about today's clinic visit or your schedule please contact [facility] AND INFECTIOUS DISEASES directly at  "[phone].  Normal or non-critical lab and imaging results will be communicated to you by MyChart, letter or phone within 4 business days after the clinic has received the results. If you do not hear from us within 7 days, please contact the clinic through [email or web address] or phone. If you have a critical or abnormal lab result, we will notify you by phone as soon as possible.  Submit refill requests through [employer] or call your pharmacy and they will forward the refill request to us. Please allow 3 business days for your refill to be completed.          Additional Information About Your Visit        [email or web address] Information     [employer] gives you secure access to your electronic health record. If you see a primary care provider, you can also send messages to your care team and make appointments. If you have questions, please call your primary care clinic.  If you do not have a primary care provider, please call [phone] and they will assist you.        Care EveryWhere ID     This is your Care EveryWhere ID. This could be used by other organizations to access your [facility] medical records  [id number]        Your Vitals Were     Pulse Temperature Height BMI (Body Mass Index)          85 98.1  F (36.7  C) (Oral) 1.803 m (5' 11\") 28.1 kg/m2         Blood Pressure from Last 3 Encounters:   09/07/18 156/85   08/09/18 145/84   05/07/18 138/76    Weight from Last 3 Encounters:   09/07/18 91.4 kg (201 lb 8 oz)   08/09/18 90.8 kg (200 lb 1.6 oz)   05/07/18 93 kg (205 lb)                 Where to get your medicines      These medications were sent to [facility] [address] [place] [address] [address] [place] [address] AT [address] [address]    Hours:  24-hours Phone:  [phone]     Noiazxh-Sksct-Nwuaulmr-TenofDF [id number] MG per tablet          Primary Care Provider Office Phone # Fax #    Francis Wyatt Cordon [phone] [address] [facility] [address]      "   Equal Access to Services     [facility] Hospital[facility] : Hadii aad ku hadjuan rmanju Gmali, waaxda luqadaha, qaybta kaandressadarby ny, silverio mims. So [facility] [phone].    ATENCIÓN: Si habla español, tiene a nicole disposición servicios gratuitos de asistencia lingüística. Mckennaame al [phone].    We comply with applicable federal civil rights laws and [place] laws. We do not discriminate on the basis of race, color, national origin, age, disability, sex, sexual orientation, or gender identity.            Thank you!     Thank you for choosing [facility] AND INFECTIOUS DISEASES  for your care. Our goal is always to provide you with excellent care. Hearing back from our patients is one way we can continue to improve our services. Please take a few minutes to complete the written survey that you may receive in the mail after your visit with us. Thank you!             Your Updated Medication List - Protect others around you: Learn how to safely use, store and throw away your medicines at www.disposemymeds.org.          This list is accurate as of 9/7/18 11:33 AM.  Always use your most recent med list.                   Brand Name Dispense Instructions for use Diagnosis    Ycbicqv-Vqbwd-Lkegqhwd-TenofDF [id number] MG per tablet    STRIBILD    30 tablet    Take 1 tablet by mouth daily with food    HIV (human immunodeficiency virus infection) (H)       fluconazole 200 MG tablet    DIFLUCAN    30 tablet    Take 1 tablet by mouth daily.    HIV (human immunodeficiency virus infection) (H), Dermatitis fungal       ketoconazole 2 % cream    NIZORAL    15 g    Apply topically daily    HIV (human immunodeficiency virus infection) (H), Dermatitis fungal       lisinopril 20 MG tablet    PRINIVIL/ZESTRIL    30 tablet    Take 1 tablet (20 mg) by mouth daily    Essential hypertension

## 2018-12-03 DIAGNOSIS — I10 ESSENTIAL HYPERTENSION: ICD-10-CM

## 2018-12-03 RX ORDER — LISINOPRIL 20 MG/1
TABLET ORAL
Start: 2018-12-03

## 2018-12-03 NOTE — TELEPHONE ENCOUNTER
"Requested Prescriptions   Pending Prescriptions Disp Refills     lisinopril (PRINIVIL/ZESTRIL) 20 MG tablet [Pharmacy Med Name: LISINOPRIL 20MG TABLETS] 30 tablet 0     Sig: TAKE 1 TABLET(20 MG) BY MOUTH DAILY    ACE Inhibitors (Including Combos) Protocol Failed    12/3/2018  5:57 AM       Failed - Blood pressure under 140/90 in past 12 months    BP Readings from Last 3 Encounters:   09/07/18 156/85   08/09/18 145/84   05/07/18 138/76                Passed - Recent (12 mo) or future (30 days) visit within the authorizing provider's specialty    Patient had office visit in the last 12 months or has a visit in the next 30 days with authorizing provider or within the authorizing provider's specialty.  See \"Patient Info\" tab in inbasket, or \"Choose Columns\" in Meds & Orders section of the refill encounter.             Passed - Patient is age 18 or older       Passed - Normal serum creatinine on file in past 12 months    Recent Labs   Lab Test  08/23/18   1507   CR  0.84            Passed - Normal serum potassium on file in past 12 months    Recent Labs   Lab Test  08/23/18   1507   POTASSIUM  3.9             "

## 2018-12-07 DIAGNOSIS — I10 ESSENTIAL HYPERTENSION: ICD-10-CM

## 2018-12-07 RX ORDER — LISINOPRIL 20 MG/1
TABLET ORAL
Start: 2018-12-07

## 2018-12-07 NOTE — TELEPHONE ENCOUNTER
Please call Cheryl for patient and confirm  Refills. Cheryl is telling Pt that he has none.  Please call Pt back- [phone]

## 2018-12-07 NOTE — TELEPHONE ENCOUNTER
Pt does not need a nurse to call the pharmacy.  He has enough to last until march.  He had not counted them out.  He apologizes for the confusion

## 2019-02-11 ENCOUNTER — TELEPHONE (OUTPATIENT)
Dept: SURGERY | Facility: CLINIC | Age: 69
End: 2019-02-11

## 2019-02-14 ENCOUNTER — OFFICE VISIT (OUTPATIENT)
Dept: SURGERY | Facility: CLINIC | Age: 69
End: 2019-02-14
Payer: COMMERCIAL

## 2019-02-14 VITALS
OXYGEN SATURATION: 99 % | DIASTOLIC BLOOD PRESSURE: 83 MMHG | WEIGHT: 200.3 LBS | SYSTOLIC BLOOD PRESSURE: 151 MMHG | BODY MASS INDEX: 28.67 KG/M2 | HEIGHT: 70 IN | HEART RATE: 56 BPM

## 2019-02-14 DIAGNOSIS — K62.82 ANAL DYSPLASIA: Primary | ICD-10-CM

## 2019-02-14 ASSESSMENT — MIFFLIN-ST. JEOR: SCORE: 1679.8

## 2019-02-14 NOTE — PROGRESS NOTES
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 He was last seen in our clinic for HRA on 2018 with biopsy showing AIN 1. Anal cytology at that time was unsatisfactory.    HPI: aZc Rosenthal is not a smoker. His HIV is under good control. He has no anorectal complaints and no other concerns today.   His underwent a colonoscopy on 17 with one tubular adenoma and repeat recommended after 5 years. His brother has bladder cancer.      ASSESSMENT: Written, informed consent was obtained prior to procedure.  Prior to the start of the procedure and with procedural staff participation, I verbally confirmed the patient s identity using two indicators, relevant allergies, that the procedure was appropriate and matched the consent or emergent situation, and that the correct equipment/implants were available. Immediately prior to starting the procedure I conducted the Time Out with the procedural staff and re-confirmed the patient s name, procedure, and site/side. (The Joint Commission universal protocol was followed.)  Yes    Sedation (Moderate or Deep): None    Anal cytology was obtained today using a Dacron swab. Digital anal rectal exam was performed without any palpable lesions. Dilute acetic acid soak was completed for 2 minutes. Lubricant was used to insert the anoscope. Performed high resolution microscopy using the colposcope. The dentate line was viewed in its entirety. Some mild punctation at the dentate line but without acetowhitening. No bright acetowhitening or punctation.  The perianal area was inspected after acetic acid soak without bright acetowhitening, coarse punctation, or verruciform lesions.    PLAN: No evidence of high grade dyplasia on exam today. Follow up in 6 months.  Repeat colonoscopy in .  Patient's  questions were answered to his stated satisfaction and he is in agreement with this plan.    For details of past medical history, surgical history, family history, medications, allergies, and review of systems, please see details below.    Medical history:  No past medical history on file.    Surgical history:  No past surgical history on file.    Family history:  Family History   Problem Relation Age of Onset     Cervical Cancer Mother      Lymphoma Mother      Other Cancer Mother      Myocardial Infarction Father      Heart Disease Brother      Coronary Artery Disease Brother        Medications:  Current Outpatient Medications   Medication Sig Dispense Refill     fluconazole (DIFLUCAN) 200 MG tablet Take 1 tablet by mouth daily. 30 tablet 3     ketoconazole (NIZORAL) 2 % cream Apply topically daily 15 g 11     lisinopril (PRINIVIL/ZESTRIL) 20 MG tablet Take 1 tablet (20 mg) by mouth daily 30 tablet 11     STRIBILD [phone] mg tablet Take 1 tablet by mouth daily with food 30 tablet 11     Allergies:  The patientis allergic to amoxicillin.    Social history:  Social History     Tobacco Use     Smoking status: Never Smoker     Smokeless tobacco: Never Used   Substance Use Topics     Alcohol use: Yes     Alcohol/week: 0.0 oz     Comment: 3x yr     Marital status: single.    Review of Systems:  There are no exam notes on file for this visit.     This procedure was performed under a collaborative agreement with Dr. Mikael Prabhakar MD, Chief of Colon and Rectal Surgery, [facility].    JUAN Marquez  Colon and Rectal Surgery  [facility]

## 2019-02-14 NOTE — NURSING NOTE
"Chief Complaint   Patient presents with     High Resolution Anoscopy       Vitals:    02/14/19 1249   BP: 151/83   BP Location: Left arm   Patient Position: Sitting   Cuff Size: Adult Regular   Pulse: 56   SpO2: 99%   Weight: 200 lb 4.8 oz   Height: 5' 10\"       Body mass index is 28.74 kg/m .      Yadiel Sebastian, EMT                      "

## 2019-02-14 NOTE — LETTER
2019       RE: Zac Rosenthal  [address]  [facility] [address]     Dear Colleague,    Thank you for referring your patient, Zac Rosenthal, to the [facility] COLON AND RECTAL SURGERY at [facility]. Please see a copy of my visit note below.    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 He was last seen in our clinic for HRA on 2018 with biopsy showing AIN 1. Anal cytology at that time was unsatisfactory.    HPI: Zac Rosenthal is not a smoker. His HIV is under good control. He has no anorectal complaints and no other concerns today.   His underwent a colonoscopy on 17 with one tubular adenoma and repeat recommended after 5 years. His brother has bladder cancer.      ASSESSMENT: Written, informed consent was obtained prior to procedure.  Prior to the start of the procedure and with procedural staff participation, I verbally confirmed the patient s identity using two indicators, relevant allergies, that the procedure was appropriate and matched the consent or emergent situation, and that the correct equipment/implants were available. Immediately prior to starting the procedure I conducted the Time Out with the procedural staff and re-confirmed the patient s name, procedure, and site/side. (The Joint Commission universal protocol was followed.)  Yes    Sedation (Moderate or Deep): None    Anal cytology was obtained today using a Dacron swab. Digital anal rectal exam was performed without any palpable lesions. Dilute acetic acid soak was completed for 2 minutes. Lubricant was used to insert the anoscope. Performed high resolution microscopy using the colposcope. The dentate line was viewed in its entirety. Some mild punctation at the dentate line but without  acetowhitening. No bright acetowhitening or punctation.  The perianal area was inspected after acetic acid soak without bright acetowhitening, coarse punctation, or verruciform lesions.    PLAN: No evidence of high grade dyplasia on exam today. Follow up in 6 months.  Repeat colonoscopy in 2022.  Patient's questions were answered to his stated satisfaction and he is in agreement with this plan.    For details of past medical history, surgical history, family history, medications, allergies, and review of systems, please see details below.    Medical history:  No past medical history on file.    Surgical history:  No past surgical history on file.    Family history:  Family History   Problem Relation Age of Onset     Cervical Cancer Mother      Lymphoma Mother      Other Cancer Mother      Myocardial Infarction Father      Heart Disease Brother      Coronary Artery Disease Brother        Medications:  Current Outpatient Medications   Medication Sig Dispense Refill     fluconazole (DIFLUCAN) 200 MG tablet Take 1 tablet by mouth daily. 30 tablet 3     ketoconazole (NIZORAL) 2 % cream Apply topically daily 15 g 11     lisinopril (PRINIVIL/ZESTRIL) 20 MG tablet Take 1 tablet (20 mg) by mouth daily 30 tablet 11     STRIBILD [id number] mg tablet Take 1 tablet by mouth daily with food 30 tablet 11     Allergies:  The patientis allergic to amoxicillin.    Social history:  Social History     Tobacco Use     Smoking status: Never Smoker     Smokeless tobacco: Never Used   Substance Use Topics     Alcohol use: Yes     Alcohol/week: 0.0 oz     Comment: 3x yr     Marital status: single.    Review of Systems:  There are no exam notes on file for this visit.     This procedure was performed under a collaborative agreement with Dr. Mikael Prabhakar MD, Chief of Colon and Rectal Surgery, [facility].    Rosangela Valentin NP-C  Colon and Rectal Surgery  [facility]

## 2019-02-15 LAB — COPATH REPORT: NORMAL

## 2019-02-18 ENCOUNTER — TELEPHONE (OUTPATIENT)
Dept: FAMILY MEDICINE | Facility: CLINIC | Age: 69
End: 2019-02-18

## 2019-02-18 DIAGNOSIS — I10 ESSENTIAL HYPERTENSION: ICD-10-CM

## 2019-02-19 NOTE — TELEPHONE ENCOUNTER
JF  Lisinopril      Last Written Prescription Date:  3/9/18  Last Fill Quantity: 30,   # refills: 11  Last Office Visit: 5/7/18  Future Office visit:       Routing refill request to provider for review/approval because:  BPs not in range    BP Readings from Last 6 Encounters:   02/14/19 151/83   09/07/18 156/85   08/09/18 145/84   05/07/18 138/76   05/03/18 (!) 147/91   03/09/18 143/82

## 2019-02-20 RX ORDER — LISINOPRIL 20 MG/1
TABLET ORAL
Qty: 30 TABLET | Refills: 0 | Status: SHIPPED | OUTPATIENT
Start: 2019-02-20 | End: 2019-02-28

## 2019-02-25 NOTE — TELEPHONE ENCOUNTER
Pt would like to know why there were no refills put on this medication when it was sent to the pharmacy.   [phone] ok to leave message

## 2019-02-25 NOTE — TELEPHONE ENCOUNTER
Called patient.  Informed BP has been high past 3 OV and needs f/u.  Patient verbalizes understanding. Scheduled him to see  Thursday 2/28.    Colleen Fuentes RN

## 2019-02-28 ENCOUNTER — OFFICE VISIT (OUTPATIENT)
Dept: FAMILY MEDICINE | Facility: CLINIC | Age: 69
End: 2019-02-28
Payer: COMMERCIAL

## 2019-02-28 VITALS
BODY MASS INDEX: 28.99 KG/M2 | WEIGHT: 202.5 LBS | HEART RATE: 65 BPM | SYSTOLIC BLOOD PRESSURE: 136 MMHG | OXYGEN SATURATION: 97 % | DIASTOLIC BLOOD PRESSURE: 76 MMHG | HEIGHT: 70 IN | TEMPERATURE: 97.7 F

## 2019-02-28 DIAGNOSIS — Z21 HIV (HUMAN IMMUNODEFICIENCY VIRUS INFECTION) (H): ICD-10-CM

## 2019-02-28 DIAGNOSIS — I10 ESSENTIAL HYPERTENSION: ICD-10-CM

## 2019-02-28 PROCEDURE — 99214 OFFICE O/P EST MOD 30 MIN: CPT | Performed by: FAMILY MEDICINE

## 2019-02-28 RX ORDER — LISINOPRIL 40 MG/1
40 TABLET ORAL DAILY
Qty: 90 TABLET | Refills: 1 | Status: SHIPPED | OUTPATIENT
Start: 2019-02-28 | End: 2019-08-19

## 2019-02-28 ASSESSMENT — MIFFLIN-ST. JEOR: SCORE: 1689.78

## 2019-02-28 NOTE — PROGRESS NOTES
SUBJECTIVE:   Zac Rosenthal is a 68 year old male who presents to clinic today for the following health issues:      Hypertension Follow-up      Outpatient blood pressures are not being checked about once a week with an average reading 120s/60s.    Low Salt Diet: no added salt      Amount of exercise or physical activity: 6-7 days/week for an average of greater than 60 minutes    Problems taking medications regularly: No    Medication side effects: none    Diet: regular (no restrictions)       BP Readings from Last 6 Encounters:   02/28/19 136/76   02/14/19 151/83   09/07/18 156/85   08/09/18 145/84   05/07/18 138/76   05/03/18 (!) 147/91       Fi4rs med was HCTZ, tried something different    Home BP monitoring looks good , mostly 110-120s systolic  But high here    discussed this with HIV meds      CV: no palpitations, no chest pain, no lower extremity swelling.  Resp: no shortness of breath, wheezing, or cough.      I have reviewed and updated the patient's past medical history in the EMR. Current problems are:    Patient Active Problem List   Diagnosis     Human immunodeficiency virus (HIV) disease (H)     Anal dysplasia     Essential hypertension     No past surgical history on file.    Social History     Tobacco Use     Smoking status: Never Smoker     Smokeless tobacco: Never Used   Substance Use Topics     Alcohol use: Yes     Alcohol/week: 0.0 oz     Comment: 3x yr     Family History   Problem Relation Age of Onset     Cervical Cancer Mother      Lymphoma Mother      Other Cancer Mother      Myocardial Infarction Father      Heart Disease Brother      Coronary Artery Disease Brother          Allergies, reviewed:     Allergies   Allergen Reactions     Amoxicillin        Current Outpatient Medications   Medication Sig Dispense Refill     lisinopril (PRINIVIL/ZESTRIL) 40 MG tablet Take 1 tablet (40 mg) by mouth daily 90 tablet 1     STRIBILD [phone] mg tablet Take 1 tablet by mouth daily with food  "30 tablet 11     fluconazole (DIFLUCAN) 200 MG tablet Take 1 tablet by mouth daily. (Patient not taking: Reported on 2/14/2019) 30 tablet 3     ketoconazole (NIZORAL) 2 % cream Apply topically daily (Patient not taking: Reported on 2/14/2019) 15 g 11       Objective:  /76   Pulse 65   Temp 97.7  F (36.5  C) (Oral)   Ht 1.778 m (5' 10\")   Wt 91.9 kg (202 lb 8 oz)   SpO2 97%   BMI 29.06 kg/m    General Appearance: Pleasant, alert, WN/WD in no acute respiratory distress.  Neurologic Exam: Nonfocal, no tremor. Normal gait.  Psychiatric Exam: Alert - appropriate, normal affect    ASSESSMENT/PLAN:    ICD-10-CM    1. Essential hypertension I10 lisinopril (PRINIVIL/ZESTRIL) 40 MG tablet   2. HIV (human immunodeficiency virus infection) (H) B20       Visit was mainly counseling about age and essential hypertension  Home blood pressures have been in the 110-120s    Blood pressures here continue to be borderline with frequent results in the 140s to 150s in the office    We will increase lisinopril to 40 mg to try to get him closer to the goal of 120s    Discussed his follow-up with his HIV doc  Overall this is been going well and he does not have any additional concerns about that    Recommend follow-up in 6 months for a complete physical and fasting labs    Francis Cordon MD MPH    "

## 2019-04-02 DIAGNOSIS — Z21 HIV (HUMAN IMMUNODEFICIENCY VIRUS INFECTION) (H): ICD-10-CM

## 2019-04-02 LAB
ALBUMIN SERPL-MCNC: 3.7 G/DL (ref 3.4–5)
ALP SERPL-CCNC: 87 U/L (ref 40–150)
ALT SERPL W P-5'-P-CCNC: 21 U/L (ref 0–70)
ANION GAP SERPL CALCULATED.3IONS-SCNC: 5 MMOL/L (ref 3–14)
AST SERPL W P-5'-P-CCNC: 21 U/L (ref 0–45)
BASOPHILS # BLD AUTO: 0 10E9/L (ref 0–0.2)
BASOPHILS NFR BLD AUTO: 0.5 %
BILIRUB SERPL-MCNC: 0.9 MG/DL (ref 0.2–1.3)
BUN SERPL-MCNC: 10 MG/DL (ref 7–30)
CALCIUM SERPL-MCNC: 8.9 MG/DL (ref 8.5–10.1)
CHLORIDE SERPL-SCNC: 97 MMOL/L (ref 94–109)
CHOLEST SERPL-MCNC: 110 MG/DL
CO2 SERPL-SCNC: 26 MMOL/L (ref 20–32)
CREAT SERPL-MCNC: 0.82 MG/DL (ref 0.66–1.25)
DIFFERENTIAL METHOD BLD: NORMAL
EOSINOPHIL # BLD AUTO: 0.1 10E9/L (ref 0–0.7)
EOSINOPHIL NFR BLD AUTO: 1.6 %
ERYTHROCYTE [DISTWIDTH] IN BLOOD BY AUTOMATED COUNT: 11.9 % (ref 10–15)
GFR SERPL CREATININE-BSD FRML MDRD: 90 ML/MIN/{1.73_M2}
GLUCOSE SERPL-MCNC: 79 MG/DL (ref 70–99)
HCT VFR BLD AUTO: 45.9 % (ref 40–53)
HDLC SERPL-MCNC: 52 MG/DL
HGB BLD-MCNC: 15.7 G/DL (ref 13.3–17.7)
IMM GRANULOCYTES # BLD: 0 10E9/L (ref 0–0.4)
IMM GRANULOCYTES NFR BLD: 0.2 %
LDLC SERPL CALC-MCNC: 47 MG/DL
LYMPHOCYTES # BLD AUTO: 2.1 10E9/L (ref 0.8–5.3)
LYMPHOCYTES NFR BLD AUTO: 33.9 %
MCH RBC QN AUTO: 31.5 PG (ref 26.5–33)
MCHC RBC AUTO-ENTMCNC: 34.2 G/DL (ref 31.5–36.5)
MCV RBC AUTO: 92 FL (ref 78–100)
MONOCYTES # BLD AUTO: 0.6 10E9/L (ref 0–1.3)
MONOCYTES NFR BLD AUTO: 10.2 %
NEUTROPHILS # BLD AUTO: 3.3 10E9/L (ref 1.6–8.3)
NEUTROPHILS NFR BLD AUTO: 53.6 %
NONHDLC SERPL-MCNC: 58 MG/DL
NRBC # BLD AUTO: 0 10*3/UL
NRBC BLD AUTO-RTO: 0 /100
PLATELET # BLD AUTO: 273 10E9/L (ref 150–450)
POTASSIUM SERPL-SCNC: 3.9 MMOL/L (ref 3.4–5.3)
PROT SERPL-MCNC: 6.7 G/DL (ref 6.8–8.8)
RBC # BLD AUTO: 4.99 10E12/L (ref 4.4–5.9)
SODIUM SERPL-SCNC: 128 MMOL/L (ref 133–144)
TRIGL SERPL-MCNC: 56 MG/DL
WBC # BLD AUTO: 6.2 10E9/L (ref 4–11)

## 2019-04-03 LAB
CD3 CELLS # BLD: 1468 CELLS/UL (ref 603–2990)
CD3 CELLS NFR BLD: 68 % (ref 49–84)
CD3+CD4+ CELLS # BLD: 761 CELLS/UL (ref 441–2156)
CD3+CD4+ CELLS NFR BLD: 35 % (ref 28–63)
CD3+CD4+ CELLS/CD3+CD8+ CLL BLD: 1.13 % (ref 1.4–2.6)
CD3+CD8+ CELLS # BLD: 677 CELLS/UL (ref 125–1312)
CD3+CD8+ CELLS NFR BLD: 31 % (ref 10–40)
IFC SPECIMEN: ABNORMAL

## 2019-04-19 ENCOUNTER — OFFICE VISIT (OUTPATIENT)
Dept: INFECTIOUS DISEASES | Facility: CLINIC | Age: 69
End: 2019-04-19
Attending: INTERNAL MEDICINE
Payer: COMMERCIAL

## 2019-04-19 VITALS
TEMPERATURE: 97.7 F | HEART RATE: 76 BPM | BODY MASS INDEX: 27.57 KG/M2 | WEIGHT: 192.6 LBS | OXYGEN SATURATION: 99 % | DIASTOLIC BLOOD PRESSURE: 78 MMHG | HEIGHT: 70 IN | SYSTOLIC BLOOD PRESSURE: 148 MMHG

## 2019-04-19 DIAGNOSIS — B20 HUMAN IMMUNODEFICIENCY VIRUS (HIV) DISEASE (H): Primary | ICD-10-CM

## 2019-04-19 PROCEDURE — G0463 HOSPITAL OUTPT CLINIC VISIT: HCPCS | Mod: ZF

## 2019-04-19 ASSESSMENT — PAIN SCALES - GENERAL: PAINLEVEL: NO PAIN (0)

## 2019-04-19 ASSESSMENT — MIFFLIN-ST. JEOR: SCORE: 1644.88

## 2019-04-19 NOTE — LETTER
4/19/2019      RE: Zac Rosenthal  [address]  [facility] [address]       Infectious Disease Clinic  HIV Follow Up Visit    HPI:  Zac Rosenthal is a 68 year old male who is here for follow-up on HIV treatment.  He is currently taking Stribild once a day without missing doses and no side effects.  He feels well and has no concerns.     Had a high resolution anoscopy in May 2018, August 2018, and Feb 2019.  The cytology from Feb 2019 showed no abnormality.  Also had a colonoscopy which showed a tubular adenoma in April 2017.    He established primary care with family medicine, Dr. Francis Cordon, who started hum on lisinpril for hypertension.    ROS:   Eyes: negative, visual blurring, double vision  Ears/Nose/Throat: negative, hearing loss, vertigo  Respiratory: No shortness of breath, dyspnea on exertion, cough, or hemoptysis  Cardiovascular: negative, tachycardia, irregular heart beat, chest pain and exertional chest pain or pressure  Gastrointestinal: negative, poor appetite, nausea, vomiting, abdominal pain, constipation and diarrhea  Genitourinary: negative, frequency, urgency and hematuria  Musculoskeletal: negative, joint pain and joint swelling  Neurologic: negative, headaches, syncope, numbness or tingling of hands and numbness or tingling of feet  Hematologic/Lymphatic/Immunologic: negative, chills and fever      Medications:  Current Outpatient Medications   Medication Sig Dispense Refill     fluconazole (DIFLUCAN) 200 MG tablet Take 1 tablet by mouth daily. 30 tablet 3     ketoconazole (NIZORAL) 2 % cream Apply topically daily 15 g 11     lisinopril (PRINIVIL/ZESTRIL) 40 MG tablet Take 1 tablet (40 mg) by mouth daily 90 tablet 1     STRIBILD [phone] mg tablet Take 1 tablet by mouth daily with food 30 tablet 11       Exam:  /89  Wt Readings from Last 2 Encounters:   04/19/19 87.4 kg (192 lb 9.6 oz)   02/28/19 91.9 kg (202 lb 8 oz)       Physical Exam   Constitutional: He is  well-developed, well-nourished, and in no distress.   Cardiovascular: Normal rate and regular rhythm.   Pulmonary/Chest: Breath sounds normal.   Abdominal: Soft. Bowel sounds are normal.   Lymphadenopathy:     He has no cervical adenopathy.   Neurological: He is alert.   Psychiatric: Affect and judgment normal.         Assessment and Plan:  Zac Rosenthal is a 66 year old male who is here for followup on HIV treatment.    #HIV: Well controlled on Stribild. No missed doses. Will repeat HIV labs in 6 months, before his next appt    #HTN: The patient will follow up with his PCP to address his elevated BP. Continue lisinopril.    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 Subsequent follow-up again in Feb 2019 showed no dysplasia.    #Colonic tubular adenoma polyp in April 2017.  Colonoscopy recommended after 5 years from then (3 more years).      Victor Hugo Dacosta MD  Professor of Medicine      Victor Hugo Dacosta MD

## 2019-04-19 NOTE — NURSING NOTE
"Chief Complaint   Patient presents with     RECHECK     B20     /78   Pulse 76   Temp 97.7  F (36.5  C) (Oral)   Ht 1.778 m (5' 10\")   Wt 87.4 kg (192 lb 9.6 oz)   SpO2 99%   BMI 27.64 kg/m    Elis Alston CMA    "

## 2019-04-19 NOTE — PROGRESS NOTES
Infectious Disease Clinic  HIV Follow Up Visit    HPI:  Zac Rosenthal is a 68 year old male who is here for follow-up on HIV treatment.  He is currently taking Stribild once a day without missing doses and no side effects.  He feels well and has no concerns.     Had a high resolution anoscopy in May 2018, August 2018, and Feb 2019.  The cytology from Feb 2019 showed no abnormality.  Also had a colonoscopy which showed a tubular adenoma in April 2017.    He established primary care with family medicine, Dr. Francis Cordon, who started hum on lisinpril for hypertension.    ROS:   Eyes: negative, visual blurring, double vision  Ears/Nose/Throat: negative, hearing loss, vertigo  Respiratory: No shortness of breath, dyspnea on exertion, cough, or hemoptysis  Cardiovascular: negative, tachycardia, irregular heart beat, chest pain and exertional chest pain or pressure  Gastrointestinal: negative, poor appetite, nausea, vomiting, abdominal pain, constipation and diarrhea  Genitourinary: negative, frequency, urgency and hematuria  Musculoskeletal: negative, joint pain and joint swelling  Neurologic: negative, headaches, syncope, numbness or tingling of hands and numbness or tingling of feet  Hematologic/Lymphatic/Immunologic: negative, chills and fever      Medications:  Current Outpatient Medications   Medication Sig Dispense Refill     fluconazole (DIFLUCAN) 200 MG tablet Take 1 tablet by mouth daily. 30 tablet 3     ketoconazole (NIZORAL) 2 % cream Apply topically daily 15 g 11     lisinopril (PRINIVIL/ZESTRIL) 40 MG tablet Take 1 tablet (40 mg) by mouth daily 90 tablet 1     STRIBILD [phone] mg tablet Take 1 tablet by mouth daily with food 30 tablet 11       Exam:  /89  Wt Readings from Last 2 Encounters:   04/19/19 87.4 kg (192 lb 9.6 oz)   02/28/19 91.9 kg (202 lb 8 oz)       Physical Exam   Constitutional: He is well-developed, well-nourished, and in no distress.   Cardiovascular: Normal rate and  regular rhythm.   Pulmonary/Chest: Breath sounds normal.   Abdominal: Soft. Bowel sounds are normal.   Lymphadenopathy:     He has no cervical adenopathy.   Neurological: He is alert.   Psychiatric: Affect and judgment normal.         Assessment and Plan:  Zac Rosenthal is a 66 year old male who is here for followup on HIV treatment.    #HIV: Well controlled on Stribild. No missed doses. Will repeat HIV labs in 6 months, before his next appt    #HTN: The patient will follow up with his PCP to address his elevated BP. Continue lisinopril.    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 Subsequent follow-up again in Feb 2019 showed no dysplasia.    #Colonic tubular adenoma polyp in April 2017.  Colonoscopy recommended after 5 years from then (3 more years).      Victor Hugo Dacosta MD  Professor of Medicine

## 2019-04-19 NOTE — LETTER
4/19/2019       RE: Zac Rosenthal  [address]  [facility] [address]     Dear Colleague,    Thank you for referring your patient, Zac Rosenthal, to the [facility] AND INFECTIOUS DISEASES at [facility]. Please see a copy of my visit note below.    Infectious Disease Clinic  HIV Follow Up Visit    HPI:  Zac Rosenthal is a 68 year old male who is here for follow-up on HIV treatment.  He is currently taking Stribild once a day without missing doses and no side effects.  He feels well and has no concerns.     Had a high resolution anoscopy in May 2018, August 2018, and Feb 2019.  The cytology from Feb 2019 showed no abnormality.  Also had a colonoscopy which showed a tubular adenoma in April 2017.    He established primary care with family medicine, Dr. Francis Cordon, who started hum on lisinpril for hypertension.    ROS:   Eyes: negative, visual blurring, double vision  Ears/Nose/Throat: negative, hearing loss, vertigo  Respiratory: No shortness of breath, dyspnea on exertion, cough, or hemoptysis  Cardiovascular: negative, tachycardia, irregular heart beat, chest pain and exertional chest pain or pressure  Gastrointestinal: negative, poor appetite, nausea, vomiting, abdominal pain, constipation and diarrhea  Genitourinary: negative, frequency, urgency and hematuria  Musculoskeletal: negative, joint pain and joint swelling  Neurologic: negative, headaches, syncope, numbness or tingling of hands and numbness or tingling of feet  Hematologic/Lymphatic/Immunologic: negative, chills and fever      Medications:  Current Outpatient Medications   Medication Sig Dispense Refill     fluconazole (DIFLUCAN) 200 MG tablet Take 1 tablet by mouth daily. 30 tablet 3     ketoconazole (NIZORAL) 2 % cream Apply topically daily 15 g 11     lisinopril (PRINIVIL/ZESTRIL) 40 MG tablet Take 1 tablet (40 mg) by mouth daily 90 tablet 1     STRIBILD [phone] mg tablet  Take 1 tablet by mouth daily with food 30 tablet 11       Exam:  /89  Wt Readings from Last 2 Encounters:   04/19/19 87.4 kg (192 lb 9.6 oz)   02/28/19 91.9 kg (202 lb 8 oz)       Physical Exam   Constitutional: He is well-developed, well-nourished, and in no distress.   Cardiovascular: Normal rate and regular rhythm.   Pulmonary/Chest: Breath sounds normal.   Abdominal: Soft. Bowel sounds are normal.   Lymphadenopathy:     He has no cervical adenopathy.   Neurological: He is alert.   Psychiatric: Affect and judgment normal.         Assessment and Plan:  Zac Rosenthal is a 66 year old male who is here for followup on HIV treatment.    #HIV: Well controlled on Stribild. No missed doses. Will repeat HIV labs in 6 months, before his next appt    #HTN: The patient will follow up with his PCP to address his elevated BP. Continue lisinopril.    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 Subsequent follow-up again in Feb 2019 showed no dysplasia.    #Colonic tubular adenoma polyp in April 2017.  Colonoscopy recommended after 5 years from then (3 more years).      Victor Hugo Dacosta MD  Professor of Medicine

## 2019-08-19 ENCOUNTER — OFFICE VISIT (OUTPATIENT)
Dept: FAMILY MEDICINE | Facility: CLINIC | Age: 69
End: 2019-08-19
Payer: COMMERCIAL

## 2019-08-19 VITALS
HEART RATE: 61 BPM | TEMPERATURE: 97.5 F | OXYGEN SATURATION: 100 % | WEIGHT: 192 LBS | RESPIRATION RATE: 18 BRPM | DIASTOLIC BLOOD PRESSURE: 64 MMHG | BODY MASS INDEX: 27.49 KG/M2 | HEIGHT: 70 IN | SYSTOLIC BLOOD PRESSURE: 138 MMHG

## 2019-08-19 DIAGNOSIS — Z00.00 ENCOUNTER FOR MEDICARE ANNUAL WELLNESS EXAM: Primary | ICD-10-CM

## 2019-08-19 DIAGNOSIS — Z21 ASYMPTOMATIC HIV INFECTION (H): ICD-10-CM

## 2019-08-19 DIAGNOSIS — I10 ESSENTIAL HYPERTENSION: ICD-10-CM

## 2019-08-19 DIAGNOSIS — B36.9 DERMATITIS FUNGAL: ICD-10-CM

## 2019-08-19 PROCEDURE — G0438 PPPS, INITIAL VISIT: HCPCS | Performed by: FAMILY MEDICINE

## 2019-08-19 PROCEDURE — 99207 C PAF COMPLETED  NO CHARGE: CPT | Performed by: FAMILY MEDICINE

## 2019-08-19 RX ORDER — LISINOPRIL 40 MG/1
40 TABLET ORAL DAILY
Qty: 90 TABLET | Refills: 3 | Status: SHIPPED | OUTPATIENT
Start: 2019-08-19 | End: 2020-08-04

## 2019-08-19 RX ORDER — FLUCONAZOLE 200 MG/1
TABLET ORAL
Qty: 90 TABLET | Refills: 3 | Status: SHIPPED | OUTPATIENT
Start: 2019-08-19 | End: 2020-09-10

## 2019-08-19 RX ORDER — KETOCONAZOLE 20 MG/G
CREAM TOPICAL DAILY
Qty: 15 G | Refills: 11 | Status: SHIPPED | OUTPATIENT
Start: 2019-08-19 | End: 2020-09-10

## 2019-08-19 ASSESSMENT — MIFFLIN-ST. JEOR: SCORE: 1642.16

## 2019-08-19 ASSESSMENT — ACTIVITIES OF DAILY LIVING (ADL): CURRENT_FUNCTION: NO ASSISTANCE NEEDED

## 2019-08-19 NOTE — PROGRESS NOTES
"SUBJECTIVE:   Zac Rosenthal is a 68 year old male who presents for Preventive Visit.  Are you in the first 12 months of your Medicare coverage?  No      Healthy Habits:     In general, how would you rate your overall health?  Good    Frequency of exercise:  4-5 days/week    Duration of exercise:  Greater than 60 minutes    Do you usually eat at least 4 servings of fruit and vegetables a day, include whole grains    & fiber and avoid regularly eating high fat or \"junk\" foods?  Yes    Taking medications regularly:  Yes    Medication side effects:  None    Ability to successfully perform activities of daily living:  No assistance needed    Home Safety:  No safety concerns identified    Hearing Impairment:  No hearing concerns    In the past 6 months, have you been bothered by leaking of urine?  No    In general, how would you rate your overall mental or emotional health?  Excellent      PHQ-2 Total Score: 0    Additional concerns today:  No    Do you feel safe in your environment? Yes    Do you have a Health Care Directive? No: Advance care planning was reviewed with patient; patient declined at this time.    Fall risk       in addition to health maintenance patient would like to discuss the following problem:  Patient has stable chronic conditions as noted in A/P including  Diagnoses of Asymptomatic HIV infection (H), Dermatitis fungal, and Essential hypertension were also pertinent to this visit.    These diagnoses were each reviewed for stability and medications were reviewed and refilled, labs ordered and updated.    Cognitive Screening   1) Repeat 3 items (Leader, Season, Table)    2) Clock draw: ABNORMAL handles   3) 3 item recall: Recalls 1 object   Results: NORMAL clock, 1-2 items recalled: Normal    Mini-CogTM Copyright BRIAN Tamez. Licensed by the author for use in [facility]; reprinted with permission (tammy@.[email or web address]). All rights reserved.      Do you have sleep apnea, excessive snoring or daytime " drowsiness?: no    Reviewed and updated as needed this visit by clinical staff  Tobacco  Allergies  Meds         Reviewed and updated as needed this visit by Provider        Social History     Tobacco Use     Smoking status: Never Smoker     Smokeless tobacco: Never Used   Substance Use Topics     Alcohol use: Yes     Alcohol/week: 0.0 oz     Comment: 3x yr     If you drink alcohol do you typically have >3 drinks per day or >7 drinks per week? No    Alcohol Use 8/19/2019   Prescreen: >3 drinks/day or >7 drinks/week? Not Applicable   Prescreen: >3 drinks/day or >7 drinks/week? -   No flowsheet data found.            Current providers sharing in care for this patient include:   Patient Care Team:  Francis Cordon MD as PCP - General (Family Practice)  Victor Hugo Dacosta MD as MD (Infectious Diseases)  Francis Cordon MD as Assigned PCP    The following health maintenance items are reviewed in Epic and correct as of today:  Health Maintenance   Topic Date Due     ADVANCE CARE PLANNING  1950     ZOSTER IMMUNIZATION (1 of 2) 11/13/1999     MEDICARE ANNUAL WELLNESS VISIT  11/13/2014     AORTIC ANEURYSM SCREENING (SYSTEM ASSIGNED)  11/13/2014     INFLUENZA VACCINE (1) 09/01/2019     FALL RISK ASSESSMENT  02/28/2020     MENINGITIS IMMUNIZATION (3 - Risk 2-dose series) 09/21/2020     LIPID  04/02/2024     COLONOSCOPY  05/25/2027     DTAP/TDAP/TD IMMUNIZATION (4 - Td) 08/18/2027     HEPATITIS C SCREENING  Completed     PHQ-2  Completed     IPV IMMUNIZATION  Aged Out     Lab work is in process  Labs reviewed in EPIC  BP Readings from Last 3 Encounters:   08/19/19 (!) 149/85   04/19/19 148/78   02/28/19 136/76    Wt Readings from Last 3 Encounters:   08/19/19 87.1 kg (192 lb)   04/19/19 87.4 kg (192 lb 9.6 oz)   02/28/19 91.9 kg (202 lb 8 oz)                  Patient Active Problem List   Diagnosis     Human immunodeficiency virus (HIV) disease (H)     Anal dysplasia     Essential hypertension      "No past surgical history on file.    Social History     Tobacco Use     Smoking status: Never Smoker     Smokeless tobacco: Never Used   Substance Use Topics     Alcohol use: Yes     Alcohol/week: 0.0 oz     Comment: 3x yr     Family History   Problem Relation Age of Onset     Cervical Cancer Mother      Lymphoma Mother      Other Cancer Mother      Myocardial Infarction Father      Heart Disease Brother      Coronary Artery Disease Brother          Current Outpatient Medications   Medication Sig Dispense Refill     fluconazole (DIFLUCAN) 200 MG tablet Take 1 tablet by mouth daily. 30 tablet 3     ketoconazole (NIZORAL) 2 % cream Apply topically daily 15 g 11     lisinopril (PRINIVIL/ZESTRIL) 40 MG tablet Take 1 tablet (40 mg) by mouth daily 90 tablet 1     STRIBILD [id number] mg tablet Take 1 tablet by mouth daily with food 30 tablet 11     Allergies   Allergen Reactions     Amoxicillin      Immunization History   Administered Date(s) Administered     HepA, Unspecified 06/25/1999, 01/21/2000     HepB-Adult 06/25/1999, 07/28/1999, 01/21/2000, 07/13/2012     Influenza (H1N1) 12/15/2009     Influenza (High Dose) 3 valent vaccine 09/21/2015     Influenza Vaccine IM 3yrs+ 4 Valent IIV4 10/09/2009, 10/12/2010, 11/04/2011, 10/09/2012, 10/25/2013, 10/14/2014     Meningococcal (Menveo ) 07/23/2015, 09/21/2015     Pneumo Conj 13-V (2010&after) 07/24/2013, 08/18/2017     Pneumococcal 23 valent 01/21/2000, 02/06/2009, 11/20/2014     TDAP Vaccine (Adacel) 10/23/2008     TDAP Vaccine (Boostrix) 08/18/2017     Tdap (Adult) Unspecified Formulation 10/01/1998         Review of Systems  Constitutional, HEENT, cardiovascular, pulmonary, GI, , musculoskeletal, neuro, skin, endocrine and psych systems are negative, except as otherwise noted.    OBJECTIVE:   BP (!) 149/85   Pulse 61   Temp 97.5  F (36.4  C) (Oral)   Resp 18   Ht 1.778 m (5' 10\")   Wt 87.1 kg (192 lb)   SpO2 100%   BMI 27.55 kg/m   Estimated body mass index " "is 27.55 kg/m  as calculated from the following:    Height as of this encounter: 1.778 m (5' 10\").    Weight as of this encounter: 87.1 kg (192 lb).  Physical Exam    General Appearance: Pleasant, alert, in no acute respiratory distress.  Head Exam: Normal. Normocephalic, atraumatic. No sinus tenderness.  Eye Exam: Normal external eye, conjunctiva, lids. ROSY.  Ear Exam: Normal auditory canals and external ears. Non-tender.  Left TM-normal. Right TM-normal.  OroPharynx Exam: Dental hygiene adequate. Normal buccal mucosa. Normal pharynx.  Neck Exam: Supple, no masses or enlarged, tender nodes.  Thyroid Exam: No nodules or enlargement or tenderness.  Chest/Respiratory Exam: Normal, comfortable, easy respirations. Chest wall normal. Lungs are clear to auscultation. No wheezing, crackles, or rhonchi.  Cardiovascular Exam: Regular rate and rhythm. No murmur, gallop, or rubs. No pedal edema.  Gastrointestinal Exam: Soft, non-tender, no masses or organomegaly.  Musculoskeletal Exam: Back is non-tender, full ROM of upper and lower extremities.  Skin: no rash, warm and dry.    Neurologic Exam: Nonfocal, no tremor. Normal gait.  Psychiatric Exam: Alert - appropriate, normal affect      Diagnostic Test Results:  Labs reviewed in Epic    ASSESSMENT / PLAN:       ICD-10-CM    1. Encounter for Medicare annual wellness exam Z00.00 PAF COMPLETED   2. Asymptomatic HIV infection (H) Z21 PAF COMPLETED     fluconazole (DIFLUCAN) 200 MG tablet     ketoconazole (NIZORAL) 2 % external cream   3. Dermatitis fungal B36.9 PAF COMPLETED     fluconazole (DIFLUCAN) 200 MG tablet     ketoconazole (NIZORAL) 2 % external cream   4. Essential hypertension I10 PAF COMPLETED     lisinopril (PRINIVIL/ZESTRIL) 40 MG tablet       End of Life Planning:  Patient currently has an advanced directive: Yes at [facility]    COUNSELING:  Reviewed preventive health counseling, as reflected in patient instructions       Regular exercise       Healthy " "diet/nutrition       Vision screening       Hearing screening       Hepatitis C screening       Colon cancer screening       Prostate cancer screening    Estimated body mass index is 27.55 kg/m  as calculated from the following:    Height as of this encounter: 1.778 m (5' 10\").    Weight as of this encounter: 87.1 kg (192 lb).         reports that he has never smoked. He has never used smokeless tobacco.      Appropriate preventive services were discussed with this patient, including applicable screening as appropriate for cardiovascular disease, diabetes, osteopenia/osteoporosis, and glaucoma.  As appropriate for age/gender, discussed screening for colorectal cancer, prostate cancer, breast cancer, and cervical cancer. Checklist reviewing preventive services available has been given to the patient.    Reviewed patients plan of care and provided an AVS. The Intermediate Care Plan ( asthma action plan, low back pain action plan, and migraine action plan) for Zac meets the Care Plan requirement. This Care Plan has been established and reviewed with the Patient.    Counseling Resources:  ATP IV Guidelines  Pooled Cohorts Equation Calculator  Breast Cancer Risk Calculator  FRAX Risk Assessment  ICSI Preventive Guidelines  Dietary Guidelines for Americans, 2010  [employer]'s MyPlate  ASA Prophylaxis  Lung CA Screening    Francis Cordon MD  [facility]    Identified Health Risks:  "

## 2019-08-19 NOTE — NURSING NOTE
"Chief Complaint   Patient presents with     Physical     BP (!) 149/85   Pulse 61   Temp 97.5  F (36.4  C) (Oral)   Resp 18   Ht 1.778 m (5' 10\")   Wt 87.1 kg (192 lb)   SpO2 100%   BMI 27.55 kg/m   Estimated body mass index is 27.55 kg/m  as calculated from the following:    Height as of this encounter: 1.778 m (5' 10\").    Weight as of this encounter: 87.1 kg (192 lb).  bp completed using cuff size: regular       Health Maintenance addressed:  BP was high, used pink card, recheck manually    Possibly completing today per provider review.    Marika Li MA     "

## 2019-08-26 ENCOUNTER — DOCUMENTATION ONLY (OUTPATIENT)
Dept: OTHER | Facility: CLINIC | Age: 69
End: 2019-08-26

## 2019-09-04 DIAGNOSIS — Z21 HIV (HUMAN IMMUNODEFICIENCY VIRUS INFECTION) (H): ICD-10-CM

## 2019-09-04 NOTE — TELEPHONE ENCOUNTER
Per [facility] Ambulatory Care Protocol, ok to refill medication. New prescription ordered.  Yojana López RN

## 2019-09-19 ENCOUNTER — OFFICE VISIT (OUTPATIENT)
Dept: SURGERY | Facility: CLINIC | Age: 69
End: 2019-09-19
Payer: COMMERCIAL

## 2019-09-19 VITALS
DIASTOLIC BLOOD PRESSURE: 82 MMHG | HEART RATE: 76 BPM | OXYGEN SATURATION: 99 % | WEIGHT: 195.2 LBS | SYSTOLIC BLOOD PRESSURE: 141 MMHG | HEIGHT: 70 IN | BODY MASS INDEX: 27.94 KG/M2

## 2019-09-19 DIAGNOSIS — K62.82 ANAL DYSPLASIA: Primary | ICD-10-CM

## 2019-09-19 ASSESSMENT — MIFFLIN-ST. JEOR: SCORE: 1656.67

## 2019-09-19 NOTE — LETTER
2019       RE: Zac Rosenthal  [address]  [facility] [address]     Dear Colleague,    Thank you for referring your patient, Zac Rosenthal, to the [facility] COLON AND RECTAL SURGERY at [facility]. Please see a copy of my visit note below.      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 He was last seen in our clinic for HRA on 19 with no evidence of high grade dysplasia and anal cytology at that time was negative.    HPI: Zac Rosenthal is not a smoker. His HIV is under good control. He has no anorectal complaints and no other concerns today.   His underwent a colonoscopy on 17 with one tubular adenoma and repeat recommended after 5 years. His brother has bladder cancer.      ASSESSMENT: Written, informed consent was obtained prior to procedure.  Prior to the start of the procedure and with procedural staff participation, I verbally confirmed the patient s identity using two indicators, relevant allergies, that the procedure was appropriate and matched the consent or emergent situation, and that the correct equipment/implants were available. Immediately prior to starting the procedure I conducted the Time Out with the procedural staff and re-confirmed the patient s name, procedure, and site/side. (The Joint Commission universal protocol was followed.)  Yes    Sedation (Moderate or Deep): None    Anal cytology was obtained today using a Dacron swab. Digital anal rectal exam was performed without any palpable lesions. Dilute acetic acid soak was completed for 2 minutes. Lubricant was used to insert the anoscope. Performed high resolution microscopy using the colposcope. The dentate line was viewed in its entirety. Some mild punctation at the dentate line but without  acetowhitening. No bright acetowhitening or punctation.  The perianal area was inspected after acetic acid soak without bright acetowhitening, coarse punctation, or verruciform lesions.    PLAN: No evidence of high grade dyplasia on exam today. Follow up in 6 months.  Repeat colonoscopy in 2022.  Patient's questions were answered to his stated satisfaction and he is in agreement with this plan.    For details of past medical history, surgical history, family history, medications, allergies, and review of systems, please see details below.    Medical history:  No past medical history on file.    Surgical history:  No past surgical history on file.    Family history:  Family History   Problem Relation Age of Onset     Cervical Cancer Mother      Lymphoma Mother      Other Cancer Mother      Myocardial Infarction Father      Heart Disease Brother      Coronary Artery Disease Brother        Medications:  Current Outpatient Medications   Medication Sig Dispense Refill     fluconazole (DIFLUCAN) 200 MG tablet Take 1 tablet by mouth daily. 90 tablet 3     ketoconazole (NIZORAL) 2 % external cream Apply topically daily 15 g 11     lisinopril (PRINIVIL/ZESTRIL) 40 MG tablet Take 1 tablet (40 mg) by mouth daily 90 tablet 3     STRIBILD [id number] mg tablet Take 1 tablet by mouth daily with food 30 tablet 11     Allergies:  The patientis allergic to amoxicillin.    Social history:  Social History     Tobacco Use     Smoking status: Never Smoker     Smokeless tobacco: Never Used   Substance Use Topics     Alcohol use: Yes     Alcohol/week: 0.0 oz     Comment: 3x yr     Marital status: single.    Review of Systems:  There are no exam notes on file for this visit.     This procedure was performed under a collaborative agreement with Dr. Mikael Prabhakar MD, Chief of Colon and Rectal Surgery, [facility].    Rosangela Valentin NP-C  Colon and Rectal Surgery  [facility]

## 2019-09-19 NOTE — NURSING NOTE
"Chief Complaint   Patient presents with     High Resolution Anoscopy       Vitals:    09/19/19 1204   BP: (!) 141/82   BP Location: Left arm   Patient Position: Sitting   Cuff Size: Adult Regular   Pulse: 76   SpO2: 99%   Weight: 195 lb 3.2 oz   Height: 5' 10\"       Body mass index is 28.01 kg/m .      Yadiel Sebastian, EMT                      "

## 2019-09-19 NOTE — PROGRESS NOTES
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 He was last seen in our clinic for HRA on 19 with no evidence of high grade dysplasia and anal cytology at that time was negative.    HPI: Zac Rosenthal is not a smoker. His HIV is under good control. He has no anorectal complaints and no other concerns today.   His underwent a colonoscopy on 17 with one tubular adenoma and repeat recommended after 5 years. His brother has bladder cancer.      ASSESSMENT: Written, informed consent was obtained prior to procedure.  Prior to the start of the procedure and with procedural staff participation, I verbally confirmed the patient s identity using two indicators, relevant allergies, that the procedure was appropriate and matched the consent or emergent situation, and that the correct equipment/implants were available. Immediately prior to starting the procedure I conducted the Time Out with the procedural staff and re-confirmed the patient s name, procedure, and site/side. (The Joint Commission universal protocol was followed.)  Yes    Sedation (Moderate or Deep): None    Anal cytology was obtained today using a Dacron swab. Digital anal rectal exam was performed without any palpable lesions. Dilute acetic acid soak was completed for 2 minutes. Lubricant was used to insert the anoscope. Performed high resolution microscopy using the colposcope. The dentate line was viewed in its entirety. Some mild punctation at the dentate line but without acetowhitening. No bright acetowhitening or punctation.  The perianal area was inspected after acetic acid soak without bright acetowhitening, coarse punctation, or verruciform lesions.    PLAN: No evidence of high grade dyplasia on exam today. Follow up in 6 months.  Repeat colonoscopy in  2022.  Patient's questions were answered to his stated satisfaction and he is in agreement with this plan.    For details of past medical history, surgical history, family history, medications, allergies, and review of systems, please see details below.    Medical history:  No past medical history on file.    Surgical history:  No past surgical history on file.    Family history:  Family History   Problem Relation Age of Onset     Cervical Cancer Mother      Lymphoma Mother      Other Cancer Mother      Myocardial Infarction Father      Heart Disease Brother      Coronary Artery Disease Brother        Medications:  Current Outpatient Medications   Medication Sig Dispense Refill     fluconazole (DIFLUCAN) 200 MG tablet Take 1 tablet by mouth daily. 90 tablet 3     ketoconazole (NIZORAL) 2 % external cream Apply topically daily 15 g 11     lisinopril (PRINIVIL/ZESTRIL) 40 MG tablet Take 1 tablet (40 mg) by mouth daily 90 tablet 3     STRIBILD [id number] mg tablet Take 1 tablet by mouth daily with food 30 tablet 11     Allergies:  The patientis allergic to amoxicillin.    Social history:  Social History     Tobacco Use     Smoking status: Never Smoker     Smokeless tobacco: Never Used   Substance Use Topics     Alcohol use: Yes     Alcohol/week: 0.0 oz     Comment: 3x yr     Marital status: single.    Review of Systems:  There are no exam notes on file for this visit.     This procedure was performed under a collaborative agreement with Dr. Mikael Prabhakar MD, Chief of Colon and Rectal Surgery, [facility].    Rosangela Valentin NP-C  Colon and Rectal Surgery  [facility]

## 2019-10-04 DIAGNOSIS — B20 HUMAN IMMUNODEFICIENCY VIRUS (HIV) DISEASE (H): ICD-10-CM

## 2019-10-04 LAB
ALBUMIN SERPL-MCNC: 3.8 G/DL (ref 3.4–5)
ALP SERPL-CCNC: 95 U/L (ref 40–150)
ALT SERPL W P-5'-P-CCNC: 26 U/L (ref 0–70)
ANION GAP SERPL CALCULATED.3IONS-SCNC: 5 MMOL/L (ref 3–14)
AST SERPL W P-5'-P-CCNC: 23 U/L (ref 0–45)
BASOPHILS # BLD AUTO: 0 10E9/L (ref 0–0.2)
BASOPHILS NFR BLD AUTO: 0.4 %
BILIRUB SERPL-MCNC: 0.4 MG/DL (ref 0.2–1.3)
BUN SERPL-MCNC: 14 MG/DL (ref 7–30)
CALCIUM SERPL-MCNC: 8.4 MG/DL (ref 8.5–10.1)
CHLORIDE SERPL-SCNC: 98 MMOL/L (ref 94–109)
CO2 SERPL-SCNC: 30 MMOL/L (ref 20–32)
CREAT SERPL-MCNC: 0.8 MG/DL (ref 0.66–1.25)
DIFFERENTIAL METHOD BLD: ABNORMAL
EOSINOPHIL # BLD AUTO: 0.1 10E9/L (ref 0–0.7)
EOSINOPHIL NFR BLD AUTO: 1.1 %
ERYTHROCYTE [DISTWIDTH] IN BLOOD BY AUTOMATED COUNT: 12.1 % (ref 10–15)
GFR SERPL CREATININE-BSD FRML MDRD: >90 ML/MIN/{1.73_M2}
GLUCOSE SERPL-MCNC: 102 MG/DL (ref 70–99)
HCT VFR BLD AUTO: 48.2 % (ref 40–53)
HGB BLD-MCNC: 17 G/DL (ref 13.3–17.7)
IMM GRANULOCYTES # BLD: 0.1 10E9/L (ref 0–0.4)
IMM GRANULOCYTES NFR BLD: 0.5 %
LYMPHOCYTES # BLD AUTO: 2.9 10E9/L (ref 0.8–5.3)
LYMPHOCYTES NFR BLD AUTO: 25.9 %
MCH RBC QN AUTO: 33.4 PG (ref 26.5–33)
MCHC RBC AUTO-ENTMCNC: 35.3 G/DL (ref 31.5–36.5)
MCV RBC AUTO: 95 FL (ref 78–100)
MONOCYTES # BLD AUTO: 0.7 10E9/L (ref 0–1.3)
MONOCYTES NFR BLD AUTO: 6.7 %
NEUTROPHILS # BLD AUTO: 7.3 10E9/L (ref 1.6–8.3)
NEUTROPHILS NFR BLD AUTO: 65.4 %
NRBC # BLD AUTO: 0 10*3/UL
NRBC BLD AUTO-RTO: 0 /100
PLATELET # BLD AUTO: 294 10E9/L (ref 150–450)
POTASSIUM SERPL-SCNC: 4.3 MMOL/L (ref 3.4–5.3)
PROT SERPL-MCNC: 7 G/DL (ref 6.8–8.8)
RBC # BLD AUTO: 5.09 10E12/L (ref 4.4–5.9)
SODIUM SERPL-SCNC: 133 MMOL/L (ref 133–144)
WBC # BLD AUTO: 11.1 10E9/L (ref 4–11)

## 2019-10-05 LAB
CD3 CELLS # BLD: 1985 CELLS/UL (ref 603–2990)
CD3 CELLS NFR BLD: 69 % (ref 49–84)
CD3+CD4+ CELLS # BLD: 949 CELLS/UL (ref 441–2156)
CD3+CD4+ CELLS NFR BLD: 33 % (ref 28–63)
CD3+CD4+ CELLS/CD3+CD8+ CLL BLD: 0.94 % (ref 1.4–2.6)
CD3+CD8+ CELLS # BLD: 1003 CELLS/UL (ref 125–1312)
CD3+CD8+ CELLS NFR BLD: 35 % (ref 10–40)
IFC SPECIMEN: ABNORMAL

## 2019-10-17 ENCOUNTER — TELEPHONE (OUTPATIENT)
Dept: INFECTIOUS DISEASES | Facility: CLINIC | Age: 69
End: 2019-10-17

## 2019-10-18 ENCOUNTER — OFFICE VISIT (OUTPATIENT)
Dept: INFECTIOUS DISEASES | Facility: CLINIC | Age: 69
End: 2019-10-18
Attending: INTERNAL MEDICINE
Payer: COMMERCIAL

## 2019-10-18 VITALS
TEMPERATURE: 97.6 F | DIASTOLIC BLOOD PRESSURE: 92 MMHG | SYSTOLIC BLOOD PRESSURE: 162 MMHG | WEIGHT: 197 LBS | HEART RATE: 73 BPM | BODY MASS INDEX: 28.27 KG/M2 | OXYGEN SATURATION: 98 %

## 2019-10-18 DIAGNOSIS — B20 SYMPTOMATIC HIV INFECTION (H): Primary | ICD-10-CM

## 2019-10-18 DIAGNOSIS — B20 HUMAN IMMUNODEFICIENCY VIRUS (HIV) DISEASE (H): ICD-10-CM

## 2019-10-18 PROCEDURE — G0463 HOSPITAL OUTPT CLINIC VISIT: HCPCS | Mod: ZF

## 2019-10-18 ASSESSMENT — PAIN SCALES - GENERAL: PAINLEVEL: NO PAIN (0)

## 2019-10-18 NOTE — LETTER
10/18/2019       RE: Zac Rosenthal  [address]  [place] [address]     Dear Colleague,    Thank you for referring your patient, Zac Rosenthal, to the [facility] AND INFECTIOUS DISEASES at [facility]. Please see a copy of my visit note below.    Infectious Disease Clinic  HIV Follow Up Visit    HPI:  Zac Rosenthal is a 68 year old male who is here for follow-up on HIV treatment.  He is currently taking Stribild once a day without missing doses and no side effects.  He feels well and has no concerns.     Had a high resolution anoscopy in May 2018, August 2018, and Feb 2019.  The cytology from Feb 2019 showed no abnormality.  Also had a colonoscopy which showed a tubular adenoma in April 2017.    He established primary care with family medicine, Dr. Francis Cordon, who started hum on lisinpril for hypertension.    ROS:   Eyes: negative, visual blurring, double vision  Ears/Nose/Throat: negative, hearing loss, vertigo  Respiratory: No shortness of breath, dyspnea on exertion, cough, or hemoptysis  Cardiovascular: negative, tachycardia, irregular heart beat, chest pain and exertional chest pain or pressure  Gastrointestinal: negative, poor appetite, nausea, vomiting, abdominal pain, constipation and diarrhea  Genitourinary: negative, frequency, urgency and hematuria  Musculoskeletal: negative, joint pain and joint swelling  Neurologic: negative, headaches, syncope, numbness or tingling of hands and numbness or tingling of feet  Hematologic/Lymphatic/Immunologic: negative, chills and fever      Medications:  Current Outpatient Medications   Medication Sig Dispense Refill     fluconazole (DIFLUCAN) 200 MG tablet Take 1 tablet by mouth daily. 90 tablet 3     ketoconazole (NIZORAL) 2 % external cream Apply topically daily 15 g 11     lisinopril (PRINIVIL/ZESTRIL) 40 MG tablet Take 1 tablet (40 mg) by mouth daily 90 tablet 3     STRIBILD [id number]  mg tablet Take 1 tablet by mouth daily with food 30 tablet 11       Exam:    Physical Exam   Constitutional: He is well-developed, well-nourished, and in no distress.   Cardiovascular: Normal rate and regular rhythm.   Pulmonary/Chest: Breath sounds normal.   Abdominal: Soft. Bowel sounds are normal.   Lymphadenopathy:     He has no cervical adenopathy.   Neurological: He is alert.   Psychiatric: Affect and judgment normal.         Assessment and Plan:  Zac Rosenthal is a 66 year old male who is here for followup on HIV treatment.    #HIV: Well controlled on Stribild. No missed doses. Will repeat HIV labs in 6 months, before his next appt    #HTN: The patient will follow up with his PCP to address his elevated BP. Continue lisinopril.    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 Subsequent follow-up again in Feb 2019 showed no dysplasia.    #Colonic tubular adenoma polyp in April 2017.  Colonoscopy recommended after 5 years from then (3 more years).      Victor Hugo Dacosta MD  Professor of Medicine

## 2019-10-18 NOTE — NURSING NOTE
Chief Complaint   Patient presents with     RECHECK     follow up         BP (!) 162/92 (BP Location: Left arm, Patient Position: Sitting, Cuff Size: Adult Regular)   Pulse 73   Temp 97.6  F (36.4  C)   Wt 89.4 kg (197 lb)   SpO2 98%   BMI 28.27 kg/m        Ton Lopez, EMT

## 2019-10-18 NOTE — PROGRESS NOTES
Infectious Disease Clinic  HIV Follow Up Visit    HPI:  Zac Rosenthal is a 68 year old male who is here for follow-up on HIV treatment.  He is currently taking Stribild once a day without missing doses and no side effects.  He feels well and has no concerns.     Had a high resolution anoscopy in May 2018, August 2018, and Feb 2019.  The cytology from Feb 2019 showed no abnormality.  Also had a colonoscopy which showed a tubular adenoma in April 2017.    He established primary care with family medicine, Dr. Francis Cordon, who started hum on lisinpril for hypertension.    ROS:   Eyes: negative, visual blurring, double vision  Ears/Nose/Throat: negative, hearing loss, vertigo  Respiratory: No shortness of breath, dyspnea on exertion, cough, or hemoptysis  Cardiovascular: negative, tachycardia, irregular heart beat, chest pain and exertional chest pain or pressure  Gastrointestinal: negative, poor appetite, nausea, vomiting, abdominal pain, constipation and diarrhea  Genitourinary: negative, frequency, urgency and hematuria  Musculoskeletal: negative, joint pain and joint swelling  Neurologic: negative, headaches, syncope, numbness or tingling of hands and numbness or tingling of feet  Hematologic/Lymphatic/Immunologic: negative, chills and fever      Medications:  Current Outpatient Medications   Medication Sig Dispense Refill     fluconazole (DIFLUCAN) 200 MG tablet Take 1 tablet by mouth daily. 90 tablet 3     ketoconazole (NIZORAL) 2 % external cream Apply topically daily 15 g 11     lisinopril (PRINIVIL/ZESTRIL) 40 MG tablet Take 1 tablet (40 mg) by mouth daily 90 tablet 3     STRIBILD [id number] mg tablet Take 1 tablet by mouth daily with food 30 tablet 11       Exam:    Physical Exam   Constitutional: He is well-developed, well-nourished, and in no distress.   Cardiovascular: Normal rate and regular rhythm.   Pulmonary/Chest: Breath sounds normal.   Abdominal: Soft. Bowel sounds are normal.    Lymphadenopathy:     He has no cervical adenopathy.   Neurological: He is alert.   Psychiatric: Affect and judgment normal.         Assessment and Plan:  Zac Rosenthal is a 66 year old male who is here for followup on HIV treatment.    #HIV: Well controlled on Stribild. No missed doses. Will repeat HIV labs in 6 months, before his next appt    #HTN: The patient will follow up with his PCP to address his elevated BP. Continue lisinopril.    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 Subsequent follow-up again in Feb 2019 showed no dysplasia.    #Colonic tubular adenoma polyp in April 2017.  Colonoscopy recommended after 5 years from then (3 more years).      Victor Hugo Dacosta MD  Professor of Medicine

## 2019-10-18 NOTE — LETTER
10/18/2019       RE: Zac Rosenthal  [address]  [facility] [address]     Dear Colleague,    Thank you for referring your patient, Zac Rosenthal, to the [facility] AND INFECTIOUS DISEASES at [facility]. Please see a copy of my visit note below.    Infectious Disease Clinic  HIV Follow Up Visit    HPI:  Zac Rosenthal is a 68 year old male who is here for follow-up on HIV treatment.  He is currently taking Stribild once a day without missing doses and no side effects.  He feels well and has no concerns.     Had a high resolution anoscopy in May 2018, August 2018, and Feb 2019.  The cytology from Feb 2019 showed no abnormality.  Also had a colonoscopy which showed a tubular adenoma in April 2017.    He established primary care with family medicine, Dr. Francis Cordon, who started hum on lisinpril for hypertension.    ROS:   Eyes: negative, visual blurring, double vision  Ears/Nose/Throat: negative, hearing loss, vertigo  Respiratory: No shortness of breath, dyspnea on exertion, cough, or hemoptysis  Cardiovascular: negative, tachycardia, irregular heart beat, chest pain and exertional chest pain or pressure  Gastrointestinal: negative, poor appetite, nausea, vomiting, abdominal pain, constipation and diarrhea  Genitourinary: negative, frequency, urgency and hematuria  Musculoskeletal: negative, joint pain and joint swelling  Neurologic: negative, headaches, syncope, numbness or tingling of hands and numbness or tingling of feet  Hematologic/Lymphatic/Immunologic: negative, chills and fever      Medications:  Current Outpatient Medications   Medication Sig Dispense Refill     fluconazole (DIFLUCAN) 200 MG tablet Take 1 tablet by mouth daily. 90 tablet 3     ketoconazole (NIZORAL) 2 % external cream Apply topically daily 15 g 11     lisinopril (PRINIVIL/ZESTRIL) 40 MG tablet Take 1 tablet (40 mg) by mouth daily 90 tablet 3     STRIBILD [id number]  mg tablet Take 1 tablet by mouth daily with food 30 tablet 11       Exam:    Physical Exam   Constitutional: He is well-developed, well-nourished, and in no distress.   Cardiovascular: Normal rate and regular rhythm.   Pulmonary/Chest: Breath sounds normal.   Abdominal: Soft. Bowel sounds are normal.   Lymphadenopathy:     He has no cervical adenopathy.   Neurological: He is alert.   Psychiatric: Affect and judgment normal.         Assessment and Plan:  Zac Rosenthal is a 66 year old male who is here for followup on HIV treatment.    #HIV: Well controlled on Stribild. No missed doses. Will repeat HIV labs in 6 months, before his next appt    #HTN: The patient will follow up with his PCP to address his elevated BP. Continue lisinopril.    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 Subsequent follow-up again in Feb 2019 showed no dysplasia.    #Colonic tubular adenoma polyp in April 2017.  Colonoscopy recommended after 5 years from then (3 more years).      Victor Hugo Dacosta MD  Professor of Medicine

## 2019-10-18 NOTE — LETTER
10/18/2019      RE: Zac Rosenthal  [address]  [facility] [address]       Infectious Disease Clinic  HIV Follow Up Visit    HPI:  Zac Rosenthal is a 68 year old male who is here for follow-up on HIV treatment.  He is currently taking Stribild once a day without missing doses and no side effects.  He feels well and has no concerns.     Had a high resolution anoscopy in May 2018, August 2018, and Feb 2019.  The cytology from Feb 2019 showed no abnormality.  Also had a colonoscopy which showed a tubular adenoma in April 2017.    He established primary care with family medicine, Dr. Francis Cordon, who started hum on lisinpril for hypertension.    ROS:   Eyes: negative, visual blurring, double vision  Ears/Nose/Throat: negative, hearing loss, vertigo  Respiratory: No shortness of breath, dyspnea on exertion, cough, or hemoptysis  Cardiovascular: negative, tachycardia, irregular heart beat, chest pain and exertional chest pain or pressure  Gastrointestinal: negative, poor appetite, nausea, vomiting, abdominal pain, constipation and diarrhea  Genitourinary: negative, frequency, urgency and hematuria  Musculoskeletal: negative, joint pain and joint swelling  Neurologic: negative, headaches, syncope, numbness or tingling of hands and numbness or tingling of feet  Hematologic/Lymphatic/Immunologic: negative, chills and fever      Medications:  Current Outpatient Medications   Medication Sig Dispense Refill     fluconazole (DIFLUCAN) 200 MG tablet Take 1 tablet by mouth daily. 90 tablet 3     ketoconazole (NIZORAL) 2 % external cream Apply topically daily 15 g 11     lisinopril (PRINIVIL/ZESTRIL) 40 MG tablet Take 1 tablet (40 mg) by mouth daily 90 tablet 3     STRIBILD [phone] mg tablet Take 1 tablet by mouth daily with food 30 tablet 11       Exam:    Physical Exam   Constitutional: He is well-developed, well-nourished, and in no distress.   Cardiovascular: Normal rate and regular rhythm.    Pulmonary/Chest: Breath sounds normal.   Abdominal: Soft. Bowel sounds are normal.   Lymphadenopathy:     He has no cervical adenopathy.   Neurological: He is alert.   Psychiatric: Affect and judgment normal.         Assessment and Plan:  Zac Rosenthal is a 66 year old male who is here for followup on HIV treatment.    #HIV: Well controlled on Stribild. No missed doses. Will repeat HIV labs in 6 months, before his next appt    #HTN: The patient will follow up with his PCP to address his elevated BP. Continue lisinopril.    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 Subsequent follow-up again in Feb 2019 showed no dysplasia.    #Colonic tubular adenoma polyp in April 2017.  Colonoscopy recommended after 5 years from then (3 more years).      Victor Hugo Dacosta MD  Professor of Medicine      Victor Hugo Dacosta MD

## 2020-03-12 ENCOUNTER — OFFICE VISIT (OUTPATIENT)
Dept: SURGERY | Facility: CLINIC | Age: 70
End: 2020-03-12
Payer: COMMERCIAL

## 2020-03-12 VITALS
HEART RATE: 65 BPM | DIASTOLIC BLOOD PRESSURE: 85 MMHG | SYSTOLIC BLOOD PRESSURE: 156 MMHG | WEIGHT: 199.1 LBS | BODY MASS INDEX: 28.5 KG/M2 | OXYGEN SATURATION: 94 % | HEIGHT: 70 IN

## 2020-03-12 DIAGNOSIS — K62.82 ANAL DYSPLASIA: Primary | ICD-10-CM

## 2020-03-12 ASSESSMENT — PAIN SCALES - GENERAL: PAINLEVEL: NO PAIN (0)

## 2020-03-12 ASSESSMENT — MIFFLIN-ST. JEOR: SCORE: 1669.36

## 2020-03-12 NOTE — LETTER
3/12/2020       RE: Zac Rosenthal  [address]  [facility] [address]     Dear Colleague,    Thank you for referring your patient, Zac Rosenthal, to the [facility] COLON AND RECTAL SURGERY at [facility]. Please see a copy of my visit note below.      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 He was last seen in our clinic for HRA on 19 with no evidence of high grade dysplasia at that time. Anal cytology 19 was negative.    HPI: Zac Rosenthal is not a smoker. His HIV is under good control. He has no anorectal complaints and no other concerns today.   His underwent a colonoscopy on 17 with one tubular adenoma and repeat recommended after 5 years. His brother has bladder cancer but is doing well.      ASSESSMENT: Written, informed consent was obtained prior to procedure.  Prior to the start of the procedure and with procedural staff participation, I verbally confirmed the patient s identity using two indicators, relevant allergies, that the procedure was appropriate and matched the consent or emergent situation, and that the correct equipment/implants were available. Immediately prior to starting the procedure I conducted the Time Out with the procedural staff and re-confirmed the patient s name, procedure, and site/side. (The Joint Commission universal protocol was followed.)  Yes    Sedation (Moderate or Deep): None    Anal cytology was obtained today using a Dacron swab. Digital anal rectal exam was performed without any palpable lesions. Dilute acetic acid soak was completed for 2 minutes. Lubricant was used to insert the anoscope. Performed high resolution microscopy using the colposcope. The dentate line was viewed in its entirety. Some mild punctation at the  dentate line with mild acetowhitening. No bright acetowhitening or punctation.  The perianal area was inspected after acetic acid soak without bright acetowhitening, coarse punctation, or verruciform lesions.    PLAN: No evidence of high grade dyplasia on exam today. Last high grade dysplasia was 2 years ago so okay to follow up in one year for repeat high resolution anoscopy.  Repeat colonoscopy in 2022.  Patient's questions were answered to his stated satisfaction and he is in agreement with this plan.    For details of past medical history, surgical history, family history, medications, allergies, and review of systems, please see details below.    Medical history:  No past medical history on file.    Surgical history:  No past surgical history on file.    Family history:  Family History   Problem Relation Age of Onset     Cervical Cancer Mother      Lymphoma Mother      Other Cancer Mother      Myocardial Infarction Father      Heart Disease Brother      Coronary Artery Disease Brother        Medications:  Current Outpatient Medications   Medication Sig Dispense Refill     fluconazole (DIFLUCAN) 200 MG tablet Take 1 tablet by mouth daily. 90 tablet 3     ketoconazole (NIZORAL) 2 % external cream Apply topically daily 15 g 11     lisinopril (PRINIVIL/ZESTRIL) 40 MG tablet Take 1 tablet (40 mg) by mouth daily 90 tablet 3     STRIBILD [id number] mg tablet Take 1 tablet by mouth daily with food 30 tablet 11     Allergies:  The patientis allergic to amoxicillin.    Social history:  Social History     Tobacco Use     Smoking status: Never Smoker     Smokeless tobacco: Never Used   Substance Use Topics     Alcohol use: Yes     Alcohol/week: 0.0 standard drinks     Comment: 3x yr     Marital status: single.    Review of Systems:  There are no exam notes on file for this visit.     This procedure was performed under a collaborative agreement with Dr. Mikael Prabhakar MD, Chief of Colon and Rectal Surgery, [place]  [facility].    Rosangela Valentin NP-C  Colon and Rectal Surgery  [facility]

## 2020-03-12 NOTE — PROGRESS NOTES
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 He was last seen in our clinic for HRA on 19 with no evidence of high grade dysplasia at that time. Anal cytology 19 was negative.    HPI: Zac Rosenthal is not a smoker. His HIV is under good control. He has no anorectal complaints and no other concerns today.   His underwent a colonoscopy on 17 with one tubular adenoma and repeat recommended after 5 years. His brother has bladder cancer but is doing well.      ASSESSMENT: Written, informed consent was obtained prior to procedure.  Prior to the start of the procedure and with procedural staff participation, I verbally confirmed the patient s identity using two indicators, relevant allergies, that the procedure was appropriate and matched the consent or emergent situation, and that the correct equipment/implants were available. Immediately prior to starting the procedure I conducted the Time Out with the procedural staff and re-confirmed the patient s name, procedure, and site/side. (The Joint Commission universal protocol was followed.)  Yes    Sedation (Moderate or Deep): None    Anal cytology was obtained today using a Dacron swab. Digital anal rectal exam was performed without any palpable lesions. Dilute acetic acid soak was completed for 2 minutes. Lubricant was used to insert the anoscope. Performed high resolution microscopy using the colposcope. The dentate line was viewed in its entirety. Some mild punctation at the dentate line with mild acetowhitening. No bright acetowhitening or punctation.  The perianal area was inspected after acetic acid soak without bright acetowhitening, coarse punctation, or verruciform lesions.    PLAN: No evidence of high grade dyplasia on exam today. Last high grade dysplasia was 2  years ago so okay to follow up in one year for repeat high resolution anoscopy.  Repeat colonoscopy in 2022.  Patient's questions were answered to his stated satisfaction and he is in agreement with this plan.    For details of past medical history, surgical history, family history, medications, allergies, and review of systems, please see details below.    Medical history:  No past medical history on file.    Surgical history:  No past surgical history on file.    Family history:  Family History   Problem Relation Age of Onset     Cervical Cancer Mother      Lymphoma Mother      Other Cancer Mother      Myocardial Infarction Father      Heart Disease Brother      Coronary Artery Disease Brother        Medications:  Current Outpatient Medications   Medication Sig Dispense Refill     fluconazole (DIFLUCAN) 200 MG tablet Take 1 tablet by mouth daily. 90 tablet 3     ketoconazole (NIZORAL) 2 % external cream Apply topically daily 15 g 11     lisinopril (PRINIVIL/ZESTRIL) 40 MG tablet Take 1 tablet (40 mg) by mouth daily 90 tablet 3     STRIBILD [phone] mg tablet Take 1 tablet by mouth daily with food 30 tablet 11     Allergies:  The patientis allergic to amoxicillin.    Social history:  Social History     Tobacco Use     Smoking status: Never Smoker     Smokeless tobacco: Never Used   Substance Use Topics     Alcohol use: Yes     Alcohol/week: 0.0 standard drinks     Comment: 3x yr     Marital status: single.    Review of Systems:  There are no exam notes on file for this visit.     This procedure was performed under a collaborative agreement with Dr. Mikael Prabhakar MD, Chief of Colon and Rectal Surgery, [facility].    Rosangela Valentin NP-C  Colon and Rectal Surgery  [facility]

## 2020-03-12 NOTE — NURSING NOTE
"Chief Complaint   Patient presents with     High Resolution Anoscopy     HRA       Vitals:    03/12/20 1245   BP: (!) 156/85   BP Location: Left arm   Patient Position: Sitting   Cuff Size: Adult Regular   Pulse: 65   SpO2: 94%   Weight: 199 lb 1.6 oz   Height: 5' 10\"       Body mass index is 28.57 kg/m .      Lui Burgos LPN                        "

## 2020-03-13 LAB — COPATH REPORT: NORMAL

## 2020-04-29 DIAGNOSIS — B20 HUMAN IMMUNODEFICIENCY VIRUS (HIV) DISEASE (H): ICD-10-CM

## 2020-04-29 LAB
BASOPHILS # BLD AUTO: 0 10E9/L (ref 0–0.2)
BASOPHILS NFR BLD AUTO: 0.5 %
DIFFERENTIAL METHOD BLD: NORMAL
EOSINOPHIL # BLD AUTO: 0.1 10E9/L (ref 0–0.7)
EOSINOPHIL NFR BLD AUTO: 1.3 %
ERYTHROCYTE [DISTWIDTH] IN BLOOD BY AUTOMATED COUNT: 12.2 % (ref 10–15)
HCT VFR BLD AUTO: 48.3 % (ref 40–53)
HGB BLD-MCNC: 16.9 G/DL (ref 13.3–17.7)
LYMPHOCYTES # BLD AUTO: 2.8 10E9/L (ref 0.8–5.3)
LYMPHOCYTES NFR BLD AUTO: 32.6 %
MCH RBC QN AUTO: 32.3 PG (ref 26.5–33)
MCHC RBC AUTO-ENTMCNC: 35 G/DL (ref 31.5–36.5)
MCV RBC AUTO: 92 FL (ref 78–100)
MONOCYTES # BLD AUTO: 0.8 10E9/L (ref 0–1.3)
MONOCYTES NFR BLD AUTO: 9 %
NEUTROPHILS # BLD AUTO: 4.8 10E9/L (ref 1.6–8.3)
NEUTROPHILS NFR BLD AUTO: 56.6 %
PLATELET # BLD AUTO: 261 10E9/L (ref 150–450)
RBC # BLD AUTO: 5.23 10E12/L (ref 4.4–5.9)
WBC # BLD AUTO: 8.5 10E9/L (ref 4–11)

## 2020-04-29 PROCEDURE — 87536 HIV-1 QUANT&REVRSE TRNSCRPJ: CPT | Performed by: INTERNAL MEDICINE

## 2020-04-29 PROCEDURE — 86360 T CELL ABSOLUTE COUNT/RATIO: CPT | Performed by: INTERNAL MEDICINE

## 2020-04-29 PROCEDURE — 36415 COLL VENOUS BLD VENIPUNCTURE: CPT | Performed by: INTERNAL MEDICINE

## 2020-04-29 PROCEDURE — 86359 T CELLS TOTAL COUNT: CPT | Performed by: INTERNAL MEDICINE

## 2020-04-29 PROCEDURE — 80053 COMPREHEN METABOLIC PANEL: CPT | Performed by: INTERNAL MEDICINE

## 2020-04-29 PROCEDURE — 85025 COMPLETE CBC W/AUTO DIFF WBC: CPT | Performed by: INTERNAL MEDICINE

## 2020-04-30 LAB
ALBUMIN SERPL-MCNC: 4 G/DL (ref 3.4–5)
ALP SERPL-CCNC: 83 U/L (ref 40–150)
ALT SERPL W P-5'-P-CCNC: 21 U/L (ref 0–70)
ANION GAP SERPL CALCULATED.3IONS-SCNC: 7 MMOL/L (ref 3–14)
AST SERPL W P-5'-P-CCNC: 20 U/L (ref 0–45)
BILIRUB SERPL-MCNC: 0.6 MG/DL (ref 0.2–1.3)
BUN SERPL-MCNC: 14 MG/DL (ref 7–30)
CALCIUM SERPL-MCNC: 9 MG/DL (ref 8.5–10.1)
CD3 CELLS # BLD: 1973 CELLS/UL (ref 603–2990)
CD3 CELLS NFR BLD: 72 % (ref 49–84)
CD3+CD4+ CELLS # BLD: 926 CELLS/UL (ref 441–2156)
CD3+CD4+ CELLS NFR BLD: 34 % (ref 28–63)
CD3+CD4+ CELLS/CD3+CD8+ CLL BLD: 0.94 % (ref 1.4–2.6)
CD3+CD8+ CELLS # BLD: 1003 CELLS/UL (ref 125–1312)
CD3+CD8+ CELLS NFR BLD: 36 % (ref 10–40)
CHLORIDE SERPL-SCNC: 98 MMOL/L (ref 94–109)
CO2 SERPL-SCNC: 26 MMOL/L (ref 20–32)
CREAT SERPL-MCNC: 0.85 MG/DL (ref 0.66–1.25)
GFR SERPL CREATININE-BSD FRML MDRD: 88 ML/MIN/{1.73_M2}
GLUCOSE SERPL-MCNC: 94 MG/DL (ref 70–99)
IFC SPECIMEN: ABNORMAL
POTASSIUM SERPL-SCNC: 4.6 MMOL/L (ref 3.4–5.3)
PROT SERPL-MCNC: 7.6 G/DL (ref 6.8–8.8)
SODIUM SERPL-SCNC: 131 MMOL/L (ref 133–144)

## 2020-05-01 ENCOUNTER — VIRTUAL VISIT (OUTPATIENT)
Dept: INFECTIOUS DISEASES | Facility: CLINIC | Age: 70
End: 2020-05-01
Attending: INTERNAL MEDICINE
Payer: COMMERCIAL

## 2020-05-01 DIAGNOSIS — B20 HUMAN IMMUNODEFICIENCY VIRUS (HIV) DISEASE (H): Primary | ICD-10-CM

## 2020-05-01 ASSESSMENT — PAIN SCALES - GENERAL: PAINLEVEL: NO PAIN (0)

## 2020-05-01 NOTE — PROGRESS NOTES
"Zac Rosenthal is a 69 year old male who is being evaluated via a billable video visit.      The patient has been notified of following:     \"This video visit will be conducted via a call between you and your physician/provider. We have found that certain health care needs can be provided without the need for an in-person physical exam.  This service lets us provide the care you need with a video conversation.  If a prescription is necessary we can send it directly to your pharmacy.  If lab work is needed we can place an order for that and you can then stop by our lab to have the test done at a later time.    Video visits are billed at different rates depending on your insurance coverage.  Please reach out to your insurance provider with any questions.    If during the course of the call the physician/provider feels a video visit is not appropriate, you will not be charged for this service.\"    Patient has given verbal consent for Video visit? Yes    How would you like to obtain your AVS? Velvethar    Patient would like the video invitation sent by: Text to cell phone: [phone]    Will anyone else be joining your video visit? No        Video-Visit Details    Type of service:  Video Visit    Video Start Time: 1:34 PM  Video End Time: 1:48 PM    Originating Location (pt. Location): Home    Distant Location (provider location):  [facility] AND INFECTIOUS DISEASES     Platform used for Video Visit: [facility]    Infectious Disease Clinic  HIV Follow Up Visit    HPI:  Zac Rosenthal is a 69 year old male who is here for follow-up on HIV treatment.  He is currently taking Stribild once a day without missing doses and no side effects.  He feels well and has no concerns.     Had a high resolution anoscopy in May 2018, August 2018, and Feb 2019.  The cytology from Feb 2019 showed no abnormality.  Also had a colonoscopy which showed a tubular adenoma in April 2017.    He established primary care with family " medicine, Dr. Francis Cordon, who started hum on lisinpril for hypertension.    ROS:   12 point ROS negative except as described above.      Medications:  Current Outpatient Medications   Medication Sig Dispense Refill     fluconazole (DIFLUCAN) 200 MG tablet Take 1 tablet by mouth daily. 90 tablet 3     ketoconazole (NIZORAL) 2 % external cream Apply topically daily 15 g 11     lisinopril (PRINIVIL/ZESTRIL) 40 MG tablet Take 1 tablet (40 mg) by mouth daily 90 tablet 3     STRIBILD [phone] mg tablet Take 1 tablet by mouth daily with food 30 tablet 11       Exam:    Physical Exam   Constitutional: He is well-developed, well-nourished, and in no distress. No difficulty breathing or speaking.  No cough  Neurological: He is alert.   Psychiatric: Affect and judgment normal.         Assessment and Plan:  Zac Rosenthal is a 69 year old male who is here for followup on HIV treatment.    #HIV: Well controlled on Stribild. No missed doses. Will repeat HIV labs in 6 months, before his next appt    #HTN: The patient will follow up with his PCP to address his elevated BP. Continue lisinopril.    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 Subsequent follow-up again in Feb 2019 showed no dysplasia.    #Colonic tubular adenoma polyp in April 2017.  Colonoscopy recommended after 5 years from then (3 more years).      Victor Hugo Dacosta MD  Professor of Medicine

## 2020-08-03 DIAGNOSIS — I10 ESSENTIAL HYPERTENSION: ICD-10-CM

## 2020-08-04 ENCOUNTER — MYC MEDICAL ADVICE (OUTPATIENT)
Dept: FAMILY MEDICINE | Facility: CLINIC | Age: 70
End: 2020-08-04

## 2020-08-04 RX ORDER — LISINOPRIL 40 MG/1
TABLET ORAL
Qty: 30 TABLET | Refills: 0 | Status: SHIPPED | OUTPATIENT
Start: 2020-08-04 | End: 2020-09-10

## 2020-08-04 NOTE — TELEPHONE ENCOUNTER
"Last Written Prescription Date:  8/19/2019  Last Fill Quantity: 90,  # refills: 3   Last office visit: 8/19/2019 with prescribing provider:  Yes, JF - wellness visit   Future Office Visit:      [facility].  Medication is being filled for 1 time refill only due to:  Patient needs to be seen because it has been more than one year since last visit.   Colleen Fuentes RN      Requested Prescriptions   Pending Prescriptions Disp Refills     lisinopril (ZESTRIL) 40 MG tablet [Pharmacy Med Name: LISINOPRIL 40MG TABLETS] 90 tablet 3     Sig: TAKE 1 TABLET(40 MG) BY MOUTH DAILY       ACE Inhibitors (Including Combos) Protocol Failed - 8/3/2020  8:03 AM        Failed - Blood pressure under 140/90 in past 12 months     BP Readings from Last 3 Encounters:   03/12/20 (!) 156/85   10/18/19 (!) 162/92   09/19/19 (!) 141/82                 Passed - Recent (12 mo) or future (30 days) visit within the authorizing provider's specialty     Patient has had an office visit with the authorizing provider or a provider within the authorizing providers department within the previous 12 mos or has a future within next 30 days. See \"Patient Info\" tab in inbasket, or \"Choose Columns\" in Meds & Orders section of the refill encounter.              Passed - Medication is active on med list        Passed - Patient is age 18 or older        Passed - Normal serum creatinine on file in past 12 months     Recent Labs   Lab Test 04/29/20  1350   CR 0.85       Ok to refill medication if creatinine is low          Passed - Normal serum potassium on file in past 12 months     Recent Labs   Lab Test 04/29/20  1350   POTASSIUM 4.6                      "

## 2020-09-10 ENCOUNTER — OFFICE VISIT (OUTPATIENT)
Dept: FAMILY MEDICINE | Facility: CLINIC | Age: 70
End: 2020-09-10
Payer: COMMERCIAL

## 2020-09-10 VITALS
WEIGHT: 191.2 LBS | SYSTOLIC BLOOD PRESSURE: 132 MMHG | HEART RATE: 69 BPM | DIASTOLIC BLOOD PRESSURE: 88 MMHG | RESPIRATION RATE: 14 BRPM | BODY MASS INDEX: 27.37 KG/M2 | HEIGHT: 70 IN | OXYGEN SATURATION: 98 %

## 2020-09-10 DIAGNOSIS — I10 ESSENTIAL HYPERTENSION: ICD-10-CM

## 2020-09-10 DIAGNOSIS — Z00.00 ENCOUNTER FOR MEDICARE ANNUAL WELLNESS EXAM: Primary | ICD-10-CM

## 2020-09-10 DIAGNOSIS — B36.9 DERMATITIS FUNGAL: ICD-10-CM

## 2020-09-10 DIAGNOSIS — Z21 HIV (HUMAN IMMUNODEFICIENCY VIRUS INFECTION) (H): ICD-10-CM

## 2020-09-10 DIAGNOSIS — Z21 ASYMPTOMATIC HIV INFECTION (H): ICD-10-CM

## 2020-09-10 PROCEDURE — 90662 IIV NO PRSV INCREASED AG IM: CPT | Performed by: FAMILY MEDICINE

## 2020-09-10 PROCEDURE — G0008 ADMIN INFLUENZA VIRUS VAC: HCPCS | Performed by: FAMILY MEDICINE

## 2020-09-10 PROCEDURE — 99397 PER PM REEVAL EST PAT 65+ YR: CPT | Performed by: FAMILY MEDICINE

## 2020-09-10 RX ORDER — KETOCONAZOLE 20 MG/G
CREAM TOPICAL DAILY
Qty: 15 G | Refills: 11 | Status: SHIPPED | OUTPATIENT
Start: 2020-09-10 | End: 2021-09-23

## 2020-09-10 RX ORDER — LISINOPRIL 40 MG/1
40 TABLET ORAL DAILY
Qty: 90 TABLET | Refills: 3 | Status: SHIPPED | OUTPATIENT
Start: 2020-09-10 | End: 2021-08-20

## 2020-09-10 RX ORDER — ELVITEGRAVIR, COBICISTAT, EMTRICITABINE, AND TENOFOVIR DISOPROXIL FUMARATE 150; 150; 200; 300 MG/1; MG/1; MG/1; MG/1
1 TABLET, FILM COATED ORAL
Qty: 30 TABLET | Refills: 8 | Status: SHIPPED | OUTPATIENT
Start: 2020-09-10 | End: 2021-06-08

## 2020-09-10 RX ORDER — FLUCONAZOLE 200 MG/1
TABLET ORAL
Qty: 90 TABLET | Refills: 3 | Status: SHIPPED | OUTPATIENT
Start: 2020-09-10 | End: 2021-09-23

## 2020-09-10 ASSESSMENT — ACTIVITIES OF DAILY LIVING (ADL): CURRENT_FUNCTION: NO ASSISTANCE NEEDED

## 2020-09-10 ASSESSMENT — PAIN SCALES - GENERAL: PAINLEVEL: NO PAIN (0)

## 2020-09-10 ASSESSMENT — MIFFLIN-ST. JEOR: SCORE: 1637.5

## 2020-09-10 NOTE — PROGRESS NOTES
"SUBJECTIVE:   Zac Rosenthal is a 69 year old male who presents for Preventive Visit.    Are you in the first 12 months of your Medicare coverage?  No    Healthy Habits:     In general, how would you rate your overall health?  Good    Frequency of exercise:  6-7 days/week    Duration of exercise:  45-60 minutes    Do you usually eat at least 4 servings of fruit and vegetables a day, include whole grains    & fiber and avoid regularly eating high fat or \"junk\" foods?  Yes    Taking medications regularly:  Yes    Medication side effects:  None    Ability to successfully perform activities of daily living:  No assistance needed    Home Safety:  No safety concerns identified    Hearing Impairment:  No hearing concerns    In the past 6 months, have you been bothered by leaking of urine?  No    In general, how would you rate your overall mental or emotional health?  Good      PHQ-2 Total Score: 0    Additional concerns today:  No    Do you feel safe in your environment? Yes    Have you ever done Advance Care Planning? (For example, a Health Directive, POLST, or a discussion with a medical provider or your loved ones about your wishes): Yes, advance care planning is on file.      Fall risk  Fallen 2 or more times in the past year?: No  Any fall with injury in the past year?: No    Cognitive Screening   1) Repeat 3 items (Leader, Season, Table)    2) Clock draw: NORMAL  3) 3 item recall: Recalls 2 objects  Results: NORMAL clock, 1-2 items recalled: COGNITIVE IMPAIRMENT LESS LIKELY    Mini-CogTM Copyright BRIAN Tamez. Licensed by the author for use in [facility]; reprinted with permission (tammy@.[facility]). All rights reserved.      Do you have sleep apnea, excessive snoring or daytime drowsiness?: no    Reviewed and updated as needed this visit by clinical staff         Reviewed and updated as needed this visit by Provider        Social History     Tobacco Use     Smoking status: Never Smoker     Smokeless tobacco: " Never Used   Substance Use Topics     Alcohol use: Yes     Alcohol/week: 0.0 standard drinks     Comment: 3x yr     If you drink alcohol do you typically have >3 drinks per day or >7 drinks per week? No    Alcohol Use 8/19/2019   Prescreen: >3 drinks/day or >7 drinks/week? Not Applicable   Prescreen: >3 drinks/day or >7 drinks/week? -   No flowsheet data found.            Current providers sharing in care for this patient include:   Patient Care Team:  Francis Cordon MD as PCP - General (Family Practice)  Victor Hugo Dacosta MD as MD (Infectious Diseases)  Francis Cordon MD as Assigned PCP    The following health maintenance items are reviewed in Epic and correct as of today:  Health Maintenance   Topic Date Due     ZOSTER IMMUNIZATION (1 of 2) 11/13/1999     AORTIC ANEURYSM SCREENING (SYSTEM ASSIGNED)  11/13/2014     FALL RISK ASSESSMENT  02/28/2020     INFLUENZA VACCINE (1) 09/01/2020     MEDICARE ANNUAL WELLNESS VISIT  08/19/2020     MENINGITIS IMMUNIZATION (3 - Risk 2-dose series) 09/21/2020     LIPID  04/02/2024     ADVANCE CARE PLANNING  08/26/2024     COLORECTAL CANCER SCREENING  05/25/2027     DTAP/TDAP/TD IMMUNIZATION (4 - Td) 08/18/2027     HEPATITIS C SCREENING  Completed     PHQ-2  Completed     PNEUMOCOCCAL IMMUNIZATION 65+ HIGH/HIGHEST RISK  Completed     HEPATITIS B IMMUNIZATION  Completed     IPV IMMUNIZATION  Aged Out     Lab work is in process  Labs reviewed in EPIC  BP Readings from Last 3 Encounters:   09/10/20 132/88   03/12/20 (!) 156/85   10/18/19 (!) 162/92    Wt Readings from Last 3 Encounters:   09/10/20 86.7 kg (191 lb 3.2 oz)   03/12/20 90.3 kg (199 lb 1.6 oz)   10/18/19 89.4 kg (197 lb)                  Patient Active Problem List   Diagnosis     Human immunodeficiency virus (HIV) disease (H)     Anal dysplasia     Essential hypertension     No past surgical history on file.    Social History     Tobacco Use     Smoking status: Never Smoker     Smokeless tobacco:  "Never Used   Substance Use Topics     Alcohol use: Yes     Alcohol/week: 0.0 standard drinks     Comment: 3x yr     Family History   Problem Relation Age of Onset     Cervical Cancer Mother      Lymphoma Mother      Other Cancer Mother      Myocardial Infarction Father      Heart Disease Brother      Coronary Artery Disease Brother          Current Outpatient Medications   Medication Sig Dispense Refill     fluconazole (DIFLUCAN) 200 MG tablet Take 1 tablet by mouth daily. 90 tablet 3     ketoconazole (NIZORAL) 2 % external cream Apply topically daily 15 g 11     lisinopril (ZESTRIL) 40 MG tablet TAKE 1 TABLET(40 MG) BY MOUTH DAILY 30 tablet 0     STRIBILD [id number] mg tablet Take 1 tablet by mouth daily with food 30 tablet 11     Allergies   Allergen Reactions     Amoxicillin      Immunization History   Administered Date(s) Administered     HepA, Unspecified 06/25/1999, 01/21/2000     HepB-Adult 06/25/1999, 07/28/1999, 01/21/2000, 07/13/2012     Influenza (H1N1) 12/15/2009     Influenza (High Dose) 3 valent vaccine 09/21/2015     Influenza Vaccine IM > 6 months Valent IIV4 10/09/2009, 10/12/2010, 11/04/2011, 10/09/2012, 10/25/2013, 10/14/2014     Meningococcal (Menveo ) 07/23/2015, 09/21/2015     Pneumo Conj 13-V (2010&after) 07/24/2013, 08/18/2017     Pneumococcal 23 valent 01/21/2000, 02/06/2009, 11/20/2014     TDAP Vaccine (Adacel) 10/23/2008     TDAP Vaccine (Boostrix) 08/18/2017     Tdap (Adult) Unspecified Formulation 10/01/1998         Review of Systems  Constitutional, HEENT, cardiovascular, pulmonary, GI, , musculoskeletal, neuro, skin, endocrine and psych systems are negative, except as otherwise noted.    OBJECTIVE:   There were no vitals taken for this visit. Estimated body mass index is 28.57 kg/m  as calculated from the following:    Height as of 3/12/20: 1.778 m (5' 10\").    Weight as of 3/12/20: 90.3 kg (199 lb 1.6 oz).  Physical Exam    General Appearance: Pleasant, alert, in no acute " respiratory distress.  Head Exam: Normal. Normocephalic, atraumatic. No sinus tenderness.  Eye Exam: Normal external eye, conjunctiva, lids. ROSY.  Ear Exam: Normal auditory canals and external ears. Non-tender.  Left TM-normal. Right TM-normal.  OroPharynx Exam: Dental hygiene adequate. Normal buccal mucosa. Normal pharynx.  Neck Exam: Supple, no masses or enlarged, tender nodes.  Thyroid Exam: No nodules or enlargement or tenderness.  Chest/Respiratory Exam: Normal, comfortable, easy respirations. Chest wall normal. Lungs are clear to auscultation. No wheezing, crackles, or rhonchi.  Cardiovascular Exam: Regular rate and rhythm. No murmur, gallop, or rubs. No pedal edema.  Gastrointestinal Exam: Soft, non-tender, no masses or organomegaly.  Musculoskeletal Exam: Back is non-tender, full ROM of upper and lower extremities.  Skin: no rash, warm and dry.    Neurologic Exam: Nonfocal, no tremor. Normal gait.  Psychiatric Exam: Alert - appropriate, normal affect    ASSESSMENT / PLAN:       ICD-10-CM    1. Encounter for Medicare annual wellness exam  Z00.00    2. Essential hypertension  I10 lisinopril (ZESTRIL) 40 MG tablet   3. Asymptomatic HIV infection (H)  Z21 fluconazole (DIFLUCAN) 200 MG tablet     ketoconazole (NIZORAL) 2 % external cream   4. Dermatitis fungal  B36.9 fluconazole (DIFLUCAN) 200 MG tablet     ketoconazole (NIZORAL) 2 % external cream   Patient has stable chronic conditions as noted in A/P including T Diagnoses of Essential hypertension, Asymptomatic HIV infection (H), and Dermatitis fungal were also pertinent to this visit.    These diagnoses were each reviewed for stability and medications were reviewed and refilled, labs ordered and updated.    COUNSELING:  Reviewed preventive health counseling, as reflected in patient instructions       Regular exercise       Healthy diet/nutrition       Colon cancer screening       Prostate cancer screening    Estimated body mass index is 28.57 kg/m  as  "calculated from the following:    Height as of 3/12/20: 1.778 m (5' 10\").    Weight as of 3/12/20: 90.3 kg (199 lb 1.6 oz).        He reports that he has never smoked. He has never used smokeless tobacco.      Appropriate preventive services were discussed with this patient, including applicable screening as appropriate for cardiovascular disease, diabetes, osteopenia/osteoporosis, and glaucoma.  As appropriate for age/gender, discussed screening for colorectal cancer, prostate cancer, breast cancer, and cervical cancer. Checklist reviewing preventive services available has been given to the patient.    Reviewed patients plan of care and provided an AVS. The Intermediate Care Plan ( asthma action plan, low back pain action plan, and migraine action plan) for Zac meets the Care Plan requirement. This Care Plan has been established and reviewed with the Patient.    Counseling Resources:  ATP IV Guidelines  Pooled Cohorts Equation Calculator  Breast Cancer Risk Calculator  Breast Cancer: Medication to Reduce Risk  FRAX Risk Assessment  ICSI Preventive Guidelines  Dietary Guidelines for Americans, 2010  USDA's MyPlate  ASA Prophylaxis  Lung CA Screening    Francis Wyatt Cordon MD  [facility]    Identified Health Risks:  "

## 2020-10-02 DIAGNOSIS — B20 HUMAN IMMUNODEFICIENCY VIRUS (HIV) DISEASE (H): ICD-10-CM

## 2020-10-02 LAB
ALBUMIN SERPL-MCNC: 3.9 G/DL (ref 3.4–5)
ALP SERPL-CCNC: 84 U/L (ref 40–150)
ALT SERPL W P-5'-P-CCNC: 22 U/L (ref 0–70)
ANION GAP SERPL CALCULATED.3IONS-SCNC: 8 MMOL/L (ref 3–14)
AST SERPL W P-5'-P-CCNC: 23 U/L (ref 0–45)
BASOPHILS # BLD AUTO: 0 10E9/L (ref 0–0.2)
BASOPHILS NFR BLD AUTO: 0.2 %
BILIRUB SERPL-MCNC: 0.8 MG/DL (ref 0.2–1.3)
BUN SERPL-MCNC: 12 MG/DL (ref 7–30)
CALCIUM SERPL-MCNC: 9.4 MG/DL (ref 8.5–10.1)
CHLORIDE SERPL-SCNC: 100 MMOL/L (ref 94–109)
CO2 SERPL-SCNC: 23 MMOL/L (ref 20–32)
CREAT SERPL-MCNC: 0.78 MG/DL (ref 0.66–1.25)
DIFFERENTIAL METHOD BLD: NORMAL
EOSINOPHIL # BLD AUTO: 0.1 10E9/L (ref 0–0.7)
EOSINOPHIL NFR BLD AUTO: 1.3 %
ERYTHROCYTE [DISTWIDTH] IN BLOOD BY AUTOMATED COUNT: 12.4 % (ref 10–15)
GFR SERPL CREATININE-BSD FRML MDRD: >90 ML/MIN/{1.73_M2}
GLUCOSE SERPL-MCNC: 83 MG/DL (ref 70–99)
HCT VFR BLD AUTO: 48.3 % (ref 40–53)
HGB BLD-MCNC: 16.9 G/DL (ref 13.3–17.7)
LYMPHOCYTES # BLD AUTO: 2.6 10E9/L (ref 0.8–5.3)
LYMPHOCYTES NFR BLD AUTO: 31.7 %
MCH RBC QN AUTO: 32.2 PG (ref 26.5–33)
MCHC RBC AUTO-ENTMCNC: 35 G/DL (ref 31.5–36.5)
MCV RBC AUTO: 92 FL (ref 78–100)
MONOCYTES # BLD AUTO: 0.8 10E9/L (ref 0–1.3)
MONOCYTES NFR BLD AUTO: 9.7 %
NEUTROPHILS # BLD AUTO: 4.7 10E9/L (ref 1.6–8.3)
NEUTROPHILS NFR BLD AUTO: 57.1 %
PLATELET # BLD AUTO: 276 10E9/L (ref 150–450)
POTASSIUM SERPL-SCNC: 4.1 MMOL/L (ref 3.4–5.3)
PROT SERPL-MCNC: 7.2 G/DL (ref 6.8–8.8)
RBC # BLD AUTO: 5.25 10E12/L (ref 4.4–5.9)
SODIUM SERPL-SCNC: 131 MMOL/L (ref 133–144)
WBC # BLD AUTO: 8.2 10E9/L (ref 4–11)

## 2020-10-02 PROCEDURE — 86359 T CELLS TOTAL COUNT: CPT | Performed by: INTERNAL MEDICINE

## 2020-10-02 PROCEDURE — 86360 T CELL ABSOLUTE COUNT/RATIO: CPT | Performed by: INTERNAL MEDICINE

## 2020-10-02 PROCEDURE — 87536 HIV-1 QUANT&REVRSE TRNSCRPJ: CPT | Performed by: INTERNAL MEDICINE

## 2020-10-02 PROCEDURE — 36415 COLL VENOUS BLD VENIPUNCTURE: CPT | Performed by: INTERNAL MEDICINE

## 2020-10-02 PROCEDURE — 80053 COMPREHEN METABOLIC PANEL: CPT | Performed by: INTERNAL MEDICINE

## 2020-10-02 PROCEDURE — 85025 COMPLETE CBC W/AUTO DIFF WBC: CPT | Performed by: INTERNAL MEDICINE

## 2020-10-03 LAB
CD3 CELLS # BLD: 2116 CELLS/UL (ref 603–2990)
CD3 CELLS NFR BLD: 75 % (ref 49–84)
CD3+CD4+ CELLS # BLD: 1066 CELLS/UL (ref 441–2156)
CD3+CD4+ CELLS NFR BLD: 38 % (ref 28–63)
CD3+CD4+ CELLS/CD3+CD8+ CLL BLD: 1.06 % (ref 1.4–2.6)
CD3+CD8+ CELLS # BLD: 1017 CELLS/UL (ref 125–1312)
CD3+CD8+ CELLS NFR BLD: 36 % (ref 10–40)
IFC SPECIMEN: ABNORMAL

## 2020-10-05 ENCOUNTER — TELEPHONE (OUTPATIENT)
Dept: FAMILY MEDICINE | Facility: CLINIC | Age: 70
End: 2020-10-05

## 2020-10-05 NOTE — TELEPHONE ENCOUNTER
Reason for Call:  Other vaccine    Detailed comments: Pt is informing PCP that he had is flu shot on 09.14.2020 at his local pharmacy     Phone Number Patient can be reached at: Cell number on file:    Telephone Information:   Mobile [phone]     Best Time: any    Can we leave a detailed message on this number? YES    Call taken on 10/5/2020 at 9:04 AM by Tricia Ahmadi

## 2020-10-09 ENCOUNTER — TELEPHONE (OUTPATIENT)
Dept: FAMILY MEDICINE | Facility: CLINIC | Age: 70
End: 2020-10-09

## 2020-10-09 NOTE — TELEPHONE ENCOUNTER
Reason for Call:  Other  vaccinations     Detailed comments:  Pt has questions and concerns about his pneumonia vaccinations to be removed from his chart ? Pt wasn't very clear about his questions . Please advise    Phone Number Patient can be reached at: Cell number on file:    Telephone Information:   Mobile [phone]       Best Time: any    Can we leave a detailed message on this number? YES    Call taken on 10/9/2020 at 8:06 AM by Brien Avalos

## 2020-10-09 NOTE — TELEPHONE ENCOUNTER
JF,  Pt sissy says his Mychart say he is due for pneumonia.  Informed pt- per our view, he is UTD.   Please advise if otherwise.  Thanks,  Barbara Mccracken RN    Pt does not need a call back unless more to add.

## 2020-10-16 ENCOUNTER — VIRTUAL VISIT (OUTPATIENT)
Dept: INFECTIOUS DISEASES | Facility: CLINIC | Age: 70
End: 2020-10-16
Attending: INTERNAL MEDICINE
Payer: COMMERCIAL

## 2020-10-16 DIAGNOSIS — Z21 ASYMPTOMATIC HIV INFECTION (H): Primary | ICD-10-CM

## 2020-10-16 PROCEDURE — 99214 OFFICE O/P EST MOD 30 MIN: CPT | Mod: 95 | Performed by: INTERNAL MEDICINE

## 2020-10-16 ASSESSMENT — PAIN SCALES - GENERAL: PAINLEVEL: NO PAIN (0)

## 2020-10-16 NOTE — LETTER
"10/16/2020       RE: Zac Rosenthal  [address]  [place] [address]     Dear Colleague,    Thank you for referring your patient, Zac Rosenthal, to the [facility]FECT[facility] DISE[facility] at [facility]. Please see a copy of my visit note below.    Zac Rosenthal is a 69 year old male who is being evaluated via a billable video visit.      The patient has been notified of following:     \"This video visit will be conducted via a call between you and your physician/provider. We have found that certain health care needs can be provided without the need for an in-person physical exam.  This service lets us provide the care you need with a video conversation.  If a prescription is necessary we can send it directly to your pharmacy.  If lab work is needed we can place an order for that and you can then stop by our lab to have the test done at a later time.    Video visits are billed at different rates depending on your insurance coverage.  Please reach out to your insurance provider with any questions.    If during the course of the call the physician/provider feels a video visit is not appropriate, you will not be charged for this service.\"    Patient has given verbal consent for Video visit? Yes  How would you like to obtain your AVS? MyChart    Will anyone else be joining your video visit? No        Video-Visit Details    Type of service:  Video Visit    Video Start Time: 9:45am  Video End Time: 10:02am    Originating Location (pt. Location): Home  Distant Location (provider location):  [facility] INFECTIOUS DISEASE [facility]       Platform used for Video Visit:  [facility]    Infectious Disease Clinic  HIV Follow Up Visit    HPI:  Zac Rosenthal is a 69 year old male who is here for follow-up on HIV treatment.  He is currently taking Stribild once a day without missing doses and no side effects.  He feels well and has no " concerns.     Had a high resolution anoscopy in May 2018, August 2018, and Feb 2019.  The cytology from Feb 2019 showed no abnormality.  Also had a colonoscopy which showed a tubular adenoma in April 2017.    He established primary care with family medicine, Dr. Francis Cordon, who started hum on lisinpril for hypertension.    ROS:   12 point ROS negative except as described above.      Medications:  Current Outpatient Medications   Medication Sig Dispense Refill     lisinopril (ZESTRIL) 40 MG tablet Take 1 tablet (40 mg) by mouth daily 90 tablet 3     STRIBILD [id number] mg tab Take 1 tablet by mouth daily with food 30 tablet 8     fluconazole (DIFLUCAN) 200 MG tablet Take 1 tablet by mouth daily. (Patient not taking: Reported on 10/16/2020) 90 tablet 3     ketoconazole (NIZORAL) 2 % external cream Apply topically daily (Patient not taking: Reported on 10/16/2020) 15 g 11       Exam:    Physical Exam   Constitutional: He is well-developed, well-nourished, and in no distress. No difficulty breathing or speaking.  No cough  Neurological: He is alert.   Psychiatric: Affect and judgment normal.         Assessment and Plan:  Zac Rosenthal is a 69 year old male who is here for followup on HIV treatment.    #HIV: Well controlled on Stribild. No missed doses. Will repeat HIV labs in 3 months, before his next appt.  He is getting the shingrix series through [facility].  He received #1 and will get #2 in November.    #HTN: The patient will follow up with his PCP to address his elevated BP. Continue lisinopril.    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 Subsequent follow-up again in Feb 2019 showed no dysplasia.    #Colonic tubular adenoma polyp in April 2017.  Colonoscopy recommended after 5 years from then (3 more years).      Victor Hugo Dacosta MD  Professor of Medicine

## 2020-10-16 NOTE — PROGRESS NOTES
"Zac Rosenthal is a 69 year old male who is being evaluated via a billable video visit.      The patient has been notified of following:     \"This video visit will be conducted via a call between you and your physician/provider. We have found that certain health care needs can be provided without the need for an in-person physical exam.  This service lets us provide the care you need with a video conversation.  If a prescription is necessary we can send it directly to your pharmacy.  If lab work is needed we can place an order for that and you can then stop by our lab to have the test done at a later time.    Video visits are billed at different rates depending on your insurance coverage.  Please reach out to your insurance provider with any questions.    If during the course of the call the physician/provider feels a video visit is not appropriate, you will not be charged for this service.\"    Patient has given verbal consent for Video visit? Yes  How would you like to obtain your AVS? MyChart    Will anyone else be joining your video visit? No        Video-Visit Details    Type of service:  Video Visit    Video Start Time: 9:45am  Video End Time: 10:02am    Originating Location (pt. Location): Home  Distant Location (provider location):  [facility] INFECTIOUS DISEASE CLINIC [place]       Platform used for Video Visit:  [facility]    Infectious Disease Clinic  HIV Follow Up Visit    HPI:  Zac Rosenthal is a 69 year old male who is here for follow-up on HIV treatment.  He is currently taking Stribild once a day without missing doses and no side effects.  He feels well and has no concerns.     Had a high resolution anoscopy in May 2018, August 2018, and Feb 2019.  The cytology from Feb 2019 showed no abnormality.  Also had a colonoscopy which showed a tubular adenoma in April 2017.    He established primary care with family medicine, Dr. Francis Cordon, who started hum on lisinpril for " hypertension.    ROS:   12 point ROS negative except as described above.      Medications:  Current Outpatient Medications   Medication Sig Dispense Refill     lisinopril (ZESTRIL) 40 MG tablet Take 1 tablet (40 mg) by mouth daily 90 tablet 3     STRIBILD [id number] mg tab Take 1 tablet by mouth daily with food 30 tablet 8     fluconazole (DIFLUCAN) 200 MG tablet Take 1 tablet by mouth daily. (Patient not taking: Reported on 10/16/2020) 90 tablet 3     ketoconazole (NIZORAL) 2 % external cream Apply topically daily (Patient not taking: Reported on 10/16/2020) 15 g 11       Exam:    Physical Exam   Constitutional: He is well-developed, well-nourished, and in no distress. No difficulty breathing or speaking.  No cough  Neurological: He is alert.   Psychiatric: Affect and judgment normal.         Assessment and Plan:  Zac Rosenthal is a 69 year old male who is here for followup on HIV treatment.    #HIV: Well controlled on Stribild. No missed doses. Will repeat HIV labs in 3 months, before his next appt.  He is getting the shingrix series through [facility].  He received #1 and will get #2 in November.    #HTN: The patient will follow up with his PCP to address his elevated BP. Continue lisinopril.    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 Subsequent follow-up again in Feb 2019 showed no dysplasia.    #Colonic tubular adenoma polyp in April 2017.  Colonoscopy recommended after 5 years from then (3 more years).      Victor Hugo Dacosta MD  Professor of Medicine

## 2020-10-30 ENCOUNTER — CONTACT MOVED (OUTPATIENT)
Age: 70
End: 2020-10-30
Payer: COMMERCIAL

## 2020-10-30 PROBLEM — I46.9 CARDIAC ARREST (H): Status: ACTIVE | Noted: 2020-10-30

## 2020-11-11 ENCOUNTER — TELEPHONE (OUTPATIENT)
Dept: FAMILY MEDICINE | Facility: CLINIC | Age: 70
End: 2020-11-11

## 2020-11-11 NOTE — TELEPHONE ENCOUNTER
Reason for Call:  Other Shingles vaccine    Detailed comments: Patient would records updated to show that he got his shingles vaccine. 1st shot on 09/14, 2nd 11/11.      Phone Number Patient can be reached at: Cell number on file:    Telephone Information:   Mobile [phone]       Best Time: Any    Can we leave a detailed message on this number? YES    Call taken on 11/11/2020 at 4:01 PM by Kayce Ledesma

## 2021-01-08 DIAGNOSIS — Z21 ASYMPTOMATIC HIV INFECTION (H): ICD-10-CM

## 2021-01-08 LAB
BASOPHILS # BLD AUTO: 0 10E9/L (ref 0–0.2)
BASOPHILS NFR BLD AUTO: 0.3 %
DIFFERENTIAL METHOD BLD: NORMAL
EOSINOPHIL # BLD AUTO: 0.1 10E9/L (ref 0–0.7)
EOSINOPHIL NFR BLD AUTO: 0.9 %
ERYTHROCYTE [DISTWIDTH] IN BLOOD BY AUTOMATED COUNT: 12.3 % (ref 10–15)
HCT VFR BLD AUTO: 47.9 % (ref 40–53)
HGB BLD-MCNC: 16.6 G/DL (ref 13.3–17.7)
LYMPHOCYTES # BLD AUTO: 2.3 10E9/L (ref 0.8–5.3)
LYMPHOCYTES NFR BLD AUTO: 30.2 %
MCH RBC QN AUTO: 31.9 PG (ref 26.5–33)
MCHC RBC AUTO-ENTMCNC: 34.7 G/DL (ref 31.5–36.5)
MCV RBC AUTO: 92 FL (ref 78–100)
MONOCYTES # BLD AUTO: 0.7 10E9/L (ref 0–1.3)
MONOCYTES NFR BLD AUTO: 8.8 %
NEUTROPHILS # BLD AUTO: 4.6 10E9/L (ref 1.6–8.3)
NEUTROPHILS NFR BLD AUTO: 59.8 %
PLATELET # BLD AUTO: 274 10E9/L (ref 150–450)
RBC # BLD AUTO: 5.21 10E12/L (ref 4.4–5.9)
WBC # BLD AUTO: 7.6 10E9/L (ref 4–11)

## 2021-01-08 PROCEDURE — 85025 COMPLETE CBC W/AUTO DIFF WBC: CPT | Performed by: INTERNAL MEDICINE

## 2021-01-08 PROCEDURE — 80053 COMPREHEN METABOLIC PANEL: CPT | Performed by: INTERNAL MEDICINE

## 2021-01-08 PROCEDURE — 36415 COLL VENOUS BLD VENIPUNCTURE: CPT | Performed by: INTERNAL MEDICINE

## 2021-01-08 PROCEDURE — 86359 T CELLS TOTAL COUNT: CPT | Performed by: INTERNAL MEDICINE

## 2021-01-08 PROCEDURE — 86360 T CELL ABSOLUTE COUNT/RATIO: CPT | Performed by: INTERNAL MEDICINE

## 2021-01-08 PROCEDURE — 87536 HIV-1 QUANT&REVRSE TRNSCRPJ: CPT | Performed by: INTERNAL MEDICINE

## 2021-01-09 LAB
ALBUMIN SERPL-MCNC: 3.9 G/DL (ref 3.4–5)
ALP SERPL-CCNC: 94 U/L (ref 40–150)
ALT SERPL W P-5'-P-CCNC: 24 U/L (ref 0–70)
ANION GAP SERPL CALCULATED.3IONS-SCNC: 4 MMOL/L (ref 3–14)
AST SERPL W P-5'-P-CCNC: 28 U/L (ref 0–45)
BILIRUB SERPL-MCNC: 0.7 MG/DL (ref 0.2–1.3)
BUN SERPL-MCNC: 9 MG/DL (ref 7–30)
CALCIUM SERPL-MCNC: 8.8 MG/DL (ref 8.5–10.1)
CD3 CELLS # BLD: 1812 CELLS/UL (ref 603–2990)
CD3 CELLS NFR BLD: 76 % (ref 49–84)
CD3+CD4+ CELLS # BLD: 866 CELLS/UL (ref 441–2156)
CD3+CD4+ CELLS NFR BLD: 36 % (ref 28–63)
CD3+CD4+ CELLS/CD3+CD8+ CLL BLD: 0.92 % (ref 1.4–2.6)
CD3+CD8+ CELLS # BLD: 920 CELLS/UL (ref 125–1312)
CD3+CD8+ CELLS NFR BLD: 39 % (ref 10–40)
CHLORIDE SERPL-SCNC: 100 MMOL/L (ref 94–109)
CO2 SERPL-SCNC: 27 MMOL/L (ref 20–32)
CREAT SERPL-MCNC: 0.91 MG/DL (ref 0.66–1.25)
GFR SERPL CREATININE-BSD FRML MDRD: 84 ML/MIN/{1.73_M2}
GLUCOSE SERPL-MCNC: 86 MG/DL (ref 70–99)
IFC SPECIMEN: ABNORMAL
POTASSIUM SERPL-SCNC: 4.4 MMOL/L (ref 3.4–5.3)
PROT SERPL-MCNC: 7.2 G/DL (ref 6.8–8.8)
SODIUM SERPL-SCNC: 131 MMOL/L (ref 133–144)

## 2021-01-22 ENCOUNTER — VIRTUAL VISIT (OUTPATIENT)
Dept: INFECTIOUS DISEASES | Facility: CLINIC | Age: 71
End: 2021-01-22
Attending: INTERNAL MEDICINE
Payer: COMMERCIAL

## 2021-01-22 DIAGNOSIS — B20 HUMAN IMMUNODEFICIENCY VIRUS (HIV) DISEASE (H): Primary | ICD-10-CM

## 2021-01-22 PROCEDURE — 99215 OFFICE O/P EST HI 40 MIN: CPT | Mod: 95 | Performed by: INTERNAL MEDICINE

## 2021-01-22 ASSESSMENT — PAIN SCALES - GENERAL: PAINLEVEL: NO PAIN (0)

## 2021-01-22 NOTE — PROGRESS NOTES
ERROR in Medical Record:    At his appointment with me, the patient pointed out that his record shows an Emergency Department visit on 10/30/20 for cardiac arrest.  This did not happen.  The patient did not go to the ED on 10/30/20, and he did not have a cardiac arrest.  The patient said he will try to work with medical records to have this erroneous encounter removed from his chart.    Victor Hugo Dacosta MD

## 2021-01-22 NOTE — LETTER
1/22/2021       RE: Zac Rosenthal  [address]  [place] [address]     Dear Colleague,    Thank you for referring your patient, Zac Rosenthal, to the [facility] DISE[facility] CLINIC [place] at [facility]. Please see a copy of my visit note below.    Zac is a 70 year old who is being evaluated via a billable video visit.      How would you like to obtain your AVS? Mail a copy  If the video visit is dropped, the invitation should be resent by: Send to e-mail at: [email or web address]  Will anyone else be joining your video visit? No      Video Start Time: 1:07pm    Video-Visit Details    Type of service:  Video Visit    Video End Time: 1:38pm  Originating Location (pt. Location): Home    Distant Location (provider location):  [facility] INFECT[facility]OUS DIS[facility]     Platform used for Video Visit: [facility]       Infectious Disease [facility]  HIV Follow Up Visit    HPI:  Zac Rosenthal is a 69 year old male who is here for follow-up on HIV treatment.  He is currently taking Stribild once a day without missing doses and no side effects, except potentially chronic diarrhea.  He feels well but is concerned about a low Sodium of 131.   He says that he has cut back on fluids and stopped his low-fiber diet.  He says this has helped with his diarrhea and wonders if it will help with his low sodium.    Had a high resolution anoscopy in May 2018, August 2018, and Feb 2019.  The cytology from Feb 2019 showed no abnormality.  Also had a colonoscopy which showed a tubular adenoma in April 2017.    He established primary care with family medicine, Dr. Francis Cordon, who started hum on lisinpril for hypertension.    ROS:   ROS negative except as described above.      Medications:  Current Outpatient Medications   Medication Sig Dispense Refill     lisinopril (ZESTRIL) 40 MG tablet Take 1 tablet (40 mg) by mouth daily 90 tablet 3     STRIBILD  [id number] mg tab Take 1 tablet by mouth daily with food 30 tablet 8     fluconazole (DIFLUCAN) 200 MG tablet Take 1 tablet by mouth daily. (Patient not taking: Reported on 10/16/2020) 90 tablet 3     ketoconazole (NIZORAL) 2 % external cream Apply topically daily (Patient not taking: Reported on 10/16/2020) 15 g 11       Exam:    Physical Exam   Constitutional: He is well-developed, well-nourished, and in no distress. No difficulty breathing or speaking.  No cough  Neurological: He is alert.   Psychiatric: Affect and judgment normal.         Assessment and Plan:  Zac Rosenthal is a 69 year old male who is here for followup on HIV treatment.    #HIV: Well controlled on Stribild. No missed doses. Will repeat HIV labs in 4 months (May 2021) and 8 months (September 2021), before his next appt in October 2021.    #HTN: The patient will follow up with his PCP to address his elevated BP. Continue lisinopril.    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 Subsequent follow-up again in Feb 2019 showed no dysplasia.    #Colonic tubular adenoma polyp in April 2017.  Colonoscopy recommended after 5 years from then (2 more years).    #Low sodium.  I am not concerned because he is feeling quite well, bu the patient asked to check again in one month.  Will order a BMP for one month.      Total time spend with this case is 40 minutes, with 31 minutes of face to face time, and 9 minutes in reviewing past lab results, immunization records, and previous notes.      Victor Hugo Dacosta MD  Professor of Medicine                ERROR in Medical Record:    At his appointment with me, the patient pointed out that his record shows an Emergency Department visit on 10/30/20 for cardiac arrest.  This did not happen.  The patient did not go to the ED on 10/30/20, and he did not have a cardiac arrest.  The patient said he will try to work with medical records to  have this erroneous encounter removed from his chart.    Victor Hugo Dacosta MD

## 2021-01-22 NOTE — PROGRESS NOTES
Zac is a 70 year old who is being evaluated via a billable video visit.      How would you like to obtain your AVS? Mail a copy  If the video visit is dropped, the invitation should be resent by: Send to e-mail at: [email or web address]  Will anyone else be joining your video visit? No      Video Start Time: 1:07pm    Video-Visit Details    Type of service:  Video Visit    Video End Time: 1:38pm  Originating Location (pt. Location): Home    Distant Location (provider location):  [facility] [place]     Platform used for Video Visit: [facility]       Infectious Disease Clinic  HIV Follow Up Visit    HPI:  Zac Rosenthal is a 69 year old male who is here for follow-up on HIV treatment.  He is currently taking Stribild once a day without missing doses and no side effects, except potentially chronic diarrhea.  He feels well but is concerned about a low Sodium of 131.   He says that he has cut back on fluids and stopped his low-fiber diet.  He says this has helped with his diarrhea and wonders if it will help with his low sodium.    Had a high resolution anoscopy in May 2018, August 2018, and Feb 2019.  The cytology from Feb 2019 showed no abnormality.  Also had a colonoscopy which showed a tubular adenoma in April 2017.    He established primary care with family medicine, Dr. Francis Cordon, who started hum on lisinpril for hypertension.    ROS:   ROS negative except as described above.      Medications:  Current Outpatient Medications   Medication Sig Dispense Refill     lisinopril (ZESTRIL) 40 MG tablet Take 1 tablet (40 mg) by mouth daily 90 tablet 3     STRIBILD [id number] mg tab Take 1 tablet by mouth daily with food 30 tablet 8     fluconazole (DIFLUCAN) 200 MG tablet Take 1 tablet by mouth daily. (Patient not taking: Reported on 10/16/2020) 90 tablet 3     ketoconazole (NIZORAL) 2 % external cream Apply topically daily (Patient not taking: Reported on 10/16/2020) 15 g 11        Exam:    Physical Exam   Constitutional: He is well-developed, well-nourished, and in no distress. No difficulty breathing or speaking.  No cough  Neurological: He is alert.   Psychiatric: Affect and judgment normal.         Assessment and Plan:  Zac Rosenthal is a 69 year old male who is here for followup on HIV treatment.    #HIV: Well controlled on Stribild. No missed doses. Will repeat HIV labs in 4 months (May 2021) and 8 months (September 2021), before his next appt in October 2021.    #HTN: The patient will follow up with his PCP to address his elevated BP. Continue lisinopril.    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 Subsequent follow-up again in Feb 2019 showed no dysplasia.    #Colonic tubular adenoma polyp in April 2017.  Colonoscopy recommended after 5 years from then (2 more years).    #Low sodium.  I am not concerned because he is feeling quite well, bu the patient asked to check again in one month.  Will order a BMP for one month.      Total time spend with this case is 40 minutes, with 31 minutes of face to face time, and 9 minutes in reviewing past lab results, immunization records, and previous notes.      Victor Hugo Dacosta MD  Professor of Medicine

## 2021-02-24 DIAGNOSIS — B20 HUMAN IMMUNODEFICIENCY VIRUS (HIV) DISEASE (H): ICD-10-CM

## 2021-02-24 LAB
ALBUMIN SERPL-MCNC: 3.9 G/DL (ref 3.4–5)
ALP SERPL-CCNC: 86 U/L (ref 40–150)
ALT SERPL W P-5'-P-CCNC: 21 U/L (ref 0–70)
ANION GAP SERPL CALCULATED.3IONS-SCNC: 6 MMOL/L (ref 3–14)
AST SERPL W P-5'-P-CCNC: 19 U/L (ref 0–45)
BASOPHILS # BLD AUTO: 0 10E9/L (ref 0–0.2)
BASOPHILS NFR BLD AUTO: 0.3 %
BILIRUB SERPL-MCNC: 0.7 MG/DL (ref 0.2–1.3)
BUN SERPL-MCNC: 13 MG/DL (ref 7–30)
CALCIUM SERPL-MCNC: 9.1 MG/DL (ref 8.5–10.1)
CHLORIDE SERPL-SCNC: 104 MMOL/L (ref 94–109)
CO2 SERPL-SCNC: 24 MMOL/L (ref 20–32)
CREAT SERPL-MCNC: 0.88 MG/DL (ref 0.66–1.25)
DIFFERENTIAL METHOD BLD: NORMAL
EOSINOPHIL # BLD AUTO: 0.2 10E9/L (ref 0–0.7)
EOSINOPHIL NFR BLD AUTO: 2.9 %
ERYTHROCYTE [DISTWIDTH] IN BLOOD BY AUTOMATED COUNT: 12.8 % (ref 10–15)
GFR SERPL CREATININE-BSD FRML MDRD: 86 ML/MIN/{1.73_M2}
GLUCOSE SERPL-MCNC: 83 MG/DL (ref 70–99)
HCT VFR BLD AUTO: 48.1 % (ref 40–53)
HGB BLD-MCNC: 16.3 G/DL (ref 13.3–17.7)
LYMPHOCYTES # BLD AUTO: 2.7 10E9/L (ref 0.8–5.3)
LYMPHOCYTES NFR BLD AUTO: 40.2 %
MCH RBC QN AUTO: 31.7 PG (ref 26.5–33)
MCHC RBC AUTO-ENTMCNC: 33.9 G/DL (ref 31.5–36.5)
MCV RBC AUTO: 94 FL (ref 78–100)
MONOCYTES # BLD AUTO: 0.7 10E9/L (ref 0–1.3)
MONOCYTES NFR BLD AUTO: 10.4 %
NEUTROPHILS # BLD AUTO: 3.1 10E9/L (ref 1.6–8.3)
NEUTROPHILS NFR BLD AUTO: 46.2 %
PLATELET # BLD AUTO: 238 10E9/L (ref 150–450)
POTASSIUM SERPL-SCNC: 4.2 MMOL/L (ref 3.4–5.3)
PROT SERPL-MCNC: 7.1 G/DL (ref 6.8–8.8)
RBC # BLD AUTO: 5.14 10E12/L (ref 4.4–5.9)
SODIUM SERPL-SCNC: 134 MMOL/L (ref 133–144)
WBC # BLD AUTO: 6.6 10E9/L (ref 4–11)

## 2021-02-24 PROCEDURE — 85025 COMPLETE CBC W/AUTO DIFF WBC: CPT | Performed by: INTERNAL MEDICINE

## 2021-02-24 PROCEDURE — 86359 T CELLS TOTAL COUNT: CPT | Performed by: INTERNAL MEDICINE

## 2021-02-24 PROCEDURE — 80053 COMPREHEN METABOLIC PANEL: CPT | Performed by: INTERNAL MEDICINE

## 2021-02-24 PROCEDURE — 87536 HIV-1 QUANT&REVRSE TRNSCRPJ: CPT | Performed by: INTERNAL MEDICINE

## 2021-02-24 PROCEDURE — 86360 T CELL ABSOLUTE COUNT/RATIO: CPT | Performed by: INTERNAL MEDICINE

## 2021-02-24 PROCEDURE — 36415 COLL VENOUS BLD VENIPUNCTURE: CPT | Performed by: INTERNAL MEDICINE

## 2021-02-25 LAB
CD3 CELLS # BLD: 2070 CELLS/UL (ref 603–2990)
CD3 CELLS NFR BLD: 76 % (ref 49–84)
CD3+CD4+ CELLS # BLD: 969 CELLS/UL (ref 441–2156)
CD3+CD4+ CELLS NFR BLD: 36 % (ref 28–63)
CD3+CD4+ CELLS/CD3+CD8+ CLL BLD: 0.92 % (ref 1.4–2.6)
CD3+CD8+ CELLS # BLD: 1067 CELLS/UL (ref 125–1312)
CD3+CD8+ CELLS NFR BLD: 39 % (ref 10–40)
IFC SPECIMEN: ABNORMAL

## 2021-02-26 LAB
HIV1 RNA # PLAS NAA DL=20: 188 {COPIES}/ML
HIV1 RNA SERPL NAA+PROBE-LOG#: 2.3 {LOG_COPIES}/ML

## 2021-03-01 ENCOUNTER — TELEPHONE (OUTPATIENT)
Dept: SURGERY | Facility: CLINIC | Age: 71
End: 2021-03-01

## 2021-03-02 NOTE — TELEPHONE ENCOUNTER
Zac called to let Rosangela Valentin know that he will call us to schedule anal microscopy as soon as he gets his covid vaccinations.  He knows to now call our Colorectal Clinic at [phone] to schedule 1 hr on a Thursday.  Amisha Chang/Colorectal Office

## 2021-03-03 ENCOUNTER — TELEPHONE (OUTPATIENT)
Dept: INFECTIOUS DISEASES | Facility: CLINIC | Age: 71
End: 2021-03-03

## 2021-03-03 NOTE — TELEPHONE ENCOUNTER
Called pt and relayed information from Dr. Dacosta. Pt is relieved that viral load is likely just a blip. He will have labs repeated in May as planned.  Lisa Kelly RN

## 2021-03-03 NOTE — TELEPHONE ENCOUNTER
[facility] Call Center    Phone Message    May a detailed message be left on voicemail: yes     Reason for Call:  Pt's viral load 188 please call back to discuss.     Action Taken: Message routed to:  [facility] id    Travel Screening: Not Applicable

## 2021-03-03 NOTE — TELEPHONE ENCOUNTER
"----- Message from Victor Hugo Dacosta MD sent at 3/2/2021  2:55 PM CST -----  Regarding: Lab visit in May  Please call the patient to tell him his last HIV viral load was 188.  This is low, but not undetectable.  Tell him I don't think this is worrisome and probably represents only a temporary \"blip\", but we should repeat it in May (as planned) to make sure it comes back to undetectable.  He should continue to do his best not to miss any doses.    Thanks, PB    "

## 2021-03-04 ENCOUNTER — IMMUNIZATION (OUTPATIENT)
Dept: NURSING | Facility: CLINIC | Age: 71
End: 2021-03-04
Payer: COMMERCIAL

## 2021-03-04 PROCEDURE — 0001A PR COVID VAC PFIZER DIL RECON 30 MCG/0.3 ML IM: CPT

## 2021-03-04 PROCEDURE — 91300 PR COVID VAC PFIZER DIL RECON 30 MCG/0.3 ML IM: CPT

## 2021-03-06 ENCOUNTER — HEALTH MAINTENANCE LETTER (OUTPATIENT)
Age: 71
End: 2021-03-06

## 2021-03-15 ENCOUNTER — TELEPHONE (OUTPATIENT)
Dept: SURGERY | Facility: CLINIC | Age: 71
End: 2021-03-15

## 2021-03-15 NOTE — TELEPHONE ENCOUNTER
.[facility] Call Center    Phone Message    May a detailed message be left on voicemail: yes     Reason for Call: Other: Per pt woud like  follow up appt with Alejandro Paredes for a  Microscopy. Per pt would like April 15 at 1 pm. Per pt is getting Covid Vaccine on 03/25 and just want to wait out 2 weeks after that before coming in. Please call pt to schedule.      Action Taken: Message routed to:  [facility]): Colon and Rec    Travel Screening: Not Applicable

## 2021-03-15 NOTE — TELEPHONE ENCOUNTER
Called patient to re-scheduled appointment for High Resolution Anoscopy.    Yadiel Lyle, EMT  Colon and Rectal Surgery

## 2021-03-25 ENCOUNTER — IMMUNIZATION (OUTPATIENT)
Dept: NURSING | Facility: CLINIC | Age: 71
End: 2021-03-25
Attending: INTERNAL MEDICINE
Payer: COMMERCIAL

## 2021-03-25 PROCEDURE — 91300 PR COVID VAC PFIZER DIL RECON 30 MCG/0.3 ML IM: CPT

## 2021-03-25 PROCEDURE — 0002A PR COVID VAC PFIZER DIL RECON 30 MCG/0.3 ML IM: CPT

## 2021-04-15 ENCOUNTER — OFFICE VISIT (OUTPATIENT)
Dept: SURGERY | Facility: CLINIC | Age: 71
End: 2021-04-15
Payer: COMMERCIAL

## 2021-04-15 VITALS
SYSTOLIC BLOOD PRESSURE: 158 MMHG | DIASTOLIC BLOOD PRESSURE: 91 MMHG | TEMPERATURE: 98.3 F | WEIGHT: 202.5 LBS | OXYGEN SATURATION: 98 % | HEIGHT: 70 IN | BODY MASS INDEX: 28.99 KG/M2 | HEART RATE: 63 BPM

## 2021-04-15 DIAGNOSIS — K62.82 ANAL DYSPLASIA: Primary | ICD-10-CM

## 2021-04-15 PROCEDURE — 88305 TISSUE EXAM BY PATHOLOGIST: CPT | Performed by: PATHOLOGY

## 2021-04-15 PROCEDURE — 46910 DESTRUCTION ANAL LESION(S): CPT | Performed by: NURSE PRACTITIONER

## 2021-04-15 PROCEDURE — 88112 CYTOPATH CELL ENHANCE TECH: CPT | Performed by: PATHOLOGY

## 2021-04-15 RX ORDER — LIDOCAINE HYDROCHLORIDE AND EPINEPHRINE 10; 10 MG/ML; UG/ML
3 INJECTION, SOLUTION INFILTRATION; PERINEURAL ONCE
Status: COMPLETED | OUTPATIENT
Start: 2021-04-15 | End: 2021-04-15

## 2021-04-15 RX ADMIN — LIDOCAINE HYDROCHLORIDE AND EPINEPHRINE 3 ML: 10; 10 INJECTION, SOLUTION INFILTRATION; PERINEURAL at 14:02

## 2021-04-15 ASSESSMENT — MIFFLIN-ST. JEOR: SCORE: 1683.75

## 2021-04-15 ASSESSMENT — PAIN SCALES - GENERAL: PAINLEVEL: NO PAIN (0)

## 2021-04-15 NOTE — NURSING NOTE
"Chief Complaint   Patient presents with     Follow Up     HRA       Vitals:    04/15/21 1255   BP: (!) 158/91   BP Location: Right arm   Patient Position: Sitting   Cuff Size: Adult Regular   Pulse: 63   Temp: 98.3  F (36.8  C)   TempSrc: Oral   SpO2: 98%   Weight: 202 lb 8 oz   Height: 5' 10.25\"       Body mass index is 28.85 kg/m .          Flaquita Luna CMA    "

## 2021-04-15 NOTE — PROGRESS NOTES
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 He was last seen in our clinic for HRA on 3/12/2020 with no evidence of high grade dysplasia at that time and negative anal cytology.    HPI: Zac Rosenthal is not a smoker. His HIV is under good control. He has no anorectal complaints and no other concerns today.   His underwent a colonoscopy on 17 with one tubular adenoma and repeat recommended after 5 years. His brother has bladder cancer and is doing well but is getting a prostate biopsy today.      ASSESSMENT: Written, informed consent was obtained prior to procedure.  Prior to the start of the procedure and with procedural staff participation, I verbally confirmed the patient s identity using two indicators, relevant allergies, that the procedure was appropriate and matched the consent or emergent situation, and that the correct equipment/implants were available. Immediately prior to starting the procedure I conducted the Time Out with the procedural staff and re-confirmed the patient s name, procedure, and site/side. (The Joint Commission universal protocol was followed.)  Yes    Sedation (Moderate or Deep): None    Anal cytology was obtained today using a Dacron swab. Digital anal rectal exam was performed without any palpable lesions. Dilute acetic acid soak was completed for 2 minutes. Lubricant was used to insert the anoscope. Performed high resolution microscopy using the colposcope. The dentate line was viewed in its entirety. Some acetowhitening with punctation at the dentate line in the anterior position. After injection with 1% lidocaine with epinephrine, biopsy was obtained using a baby Tischler forceps and entire lesion was destroyed with cautery. Hemostasis was obtained using Monsel solution.  The perianal area was  inspected after acetic acid soak without bright acetowhitening, coarse punctation, or verruciform lesions.    PLAN: If biopsy shows low grade dysplasia or normal, last high grade dysplasia was 3 years ago so okay to follow up in one year for repeat high resolution anoscopy. If high grade dysplasia, this was destroyed completely in clinic today so would have him follow up in 3-4 months.  Repeat colonoscopy in 2022.  Patient's questions were answered to his stated satisfaction and he is in agreement with this plan.    For details of past medical history, surgical history, family history, medications, allergies, and review of systems, please see details below.    Medical history:  No past medical history on file.    Surgical history:  No past surgical history on file.    Family history:  Family History   Problem Relation Age of Onset     Cervical Cancer Mother      Lymphoma Mother      Other Cancer Mother      Myocardial Infarction Father      Heart Disease Brother      Coronary Artery Disease Brother        Medications:  Current Outpatient Medications   Medication Sig Dispense Refill     fluconazole (DIFLUCAN) 200 MG tablet Take 1 tablet by mouth daily. (Patient not taking: Reported on 10/16/2020) 90 tablet 3     ketoconazole (NIZORAL) 2 % external cream Apply topically daily (Patient not taking: Reported on 10/16/2020) 15 g 11     lisinopril (ZESTRIL) 40 MG tablet Take 1 tablet (40 mg) by mouth daily 90 tablet 3     STRIBILD [phone] mg tab Take 1 tablet by mouth daily with food 30 tablet 8     Allergies:  The patientis allergic to amoxicillin.    Social history:  Social History     Tobacco Use     Smoking status: Never Smoker     Smokeless tobacco: Never Used   Substance Use Topics     Alcohol use: Yes     Alcohol/week: 0.0 standard drinks     Comment: 3x yr     Marital status: single.    Review of Systems:  There are no exam notes on file for this visit.     This procedure was performed under a collaborative  agreement with Dr. Mikael Prabhakar MD, Chief of Colon and Rectal Surgery, [facility].    Rosangela Valentin NP-C  Colon and Rectal Surgery  [facility]

## 2021-04-15 NOTE — LETTER
4/15/2021       RE: Zac Rosenthal  [address]  [place] [address]     Dear Colleague,    Thank you for referring your patient, Zac Rosenthal, to the [facility] COLON AND RECTAL SURGERY CLINIC [place] at [facility]. Please see a copy of my visit note below.      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 He was last seen in our clinic for HRA on 3/12/2020 with no evidence of high grade dysplasia at that time and negative anal cytology.    HPI: Zac Rosenthal is not a smoker. His HIV is under good control. He has no anorectal complaints and no other concerns today.   His underwent a colonoscopy on 17 with one tubular adenoma and repeat recommended after 5 years. His brother has bladder cancer and is doing well but is getting a prostate biopsy today.      ASSESSMENT: Written, informed consent was obtained prior to procedure.  Prior to the start of the procedure and with procedural staff participation, I verbally confirmed the patient s identity using two indicators, relevant allergies, that the procedure was appropriate and matched the consent or emergent situation, and that the correct equipment/implants were available. Immediately prior to starting the procedure I conducted the Time Out with the procedural staff and re-confirmed the patient s name, procedure, and site/side. (The Joint Commission universal protocol was followed.)  Yes    Sedation (Moderate or Deep): None    Anal cytology was obtained today using a Dacron swab. Digital anal rectal exam was performed without any palpable lesions. Dilute acetic acid soak was completed for 2 minutes. Lubricant was used to insert the anoscope. Performed high resolution microscopy using the colposcope.  The dentate line was viewed in its entirety. Some acetowhitening with punctation at the dentate line in the anterior position. After injection with 1% lidocaine with epinephrine, biopsy was obtained using a baby Tischler forceps and entire lesion was destroyed with cautery. Hemostasis was obtained using Monsel solution.  The perianal area was inspected after acetic acid soak without bright acetowhitening, coarse punctation, or verruciform lesions.    PLAN: If biopsy shows low grade dysplasia or normal, last high grade dysplasia was 3 years ago so okay to follow up in one year for repeat high resolution anoscopy. If high grade dysplasia, this was destroyed completely in clinic today so would have him follow up in 3-4 months.  Repeat colonoscopy in 2022.  Patient's questions were answered to his stated satisfaction and he is in agreement with this plan.    For details of past medical history, surgical history, family history, medications, allergies, and review of systems, please see details below.    Medical history:  No past medical history on file.    Surgical history:  No past surgical history on file.    Family history:  Family History   Problem Relation Age of Onset     Cervical Cancer Mother      Lymphoma Mother      Other Cancer Mother      Myocardial Infarction Father      Heart Disease Brother      Coronary Artery Disease Brother        Medications:  Current Outpatient Medications   Medication Sig Dispense Refill     fluconazole (DIFLUCAN) 200 MG tablet Take 1 tablet by mouth daily. (Patient not taking: Reported on 10/16/2020) 90 tablet 3     ketoconazole (NIZORAL) 2 % external cream Apply topically daily (Patient not taking: Reported on 10/16/2020) 15 g 11     lisinopril (ZESTRIL) 40 MG tablet Take 1 tablet (40 mg) by mouth daily 90 tablet 3     STRIBILD [id number] mg tab Take 1 tablet by mouth daily with food 30 tablet 8     Allergies:  The patientis allergic to amoxicillin.    Social history:  Social  History     Tobacco Use     Smoking status: Never Smoker     Smokeless tobacco: Never Used   Substance Use Topics     Alcohol use: Yes     Alcohol/week: 0.0 standard drinks     Comment: 3x yr     Marital status: single.    Review of Systems:  There are no exam notes on file for this visit.     This procedure was performed under a collaborative agreement with Dr. Mikael Prabhakar MD, Chief of Colon and Rectal Surgery, [facility].    Rosangela Valentin NP-C  Colon and Rectal Surgery  [facility]

## 2021-04-16 LAB — COPATH REPORT: NORMAL

## 2021-04-21 LAB — COPATH REPORT: NORMAL

## 2021-05-18 DIAGNOSIS — B20 HUMAN IMMUNODEFICIENCY VIRUS (HIV) DISEASE (H): ICD-10-CM

## 2021-05-18 LAB
ALBUMIN SERPL-MCNC: 4 G/DL (ref 3.4–5)
ALP SERPL-CCNC: 88 U/L (ref 40–150)
ALT SERPL W P-5'-P-CCNC: 20 U/L (ref 0–70)
ANION GAP SERPL CALCULATED.3IONS-SCNC: 6 MMOL/L (ref 3–14)
AST SERPL W P-5'-P-CCNC: 21 U/L (ref 0–45)
BASOPHILS # BLD AUTO: 0 10E9/L (ref 0–0.2)
BASOPHILS NFR BLD AUTO: 0.4 %
BILIRUB SERPL-MCNC: 0.6 MG/DL (ref 0.2–1.3)
BUN SERPL-MCNC: 12 MG/DL (ref 7–30)
CALCIUM SERPL-MCNC: 9.3 MG/DL (ref 8.5–10.1)
CHLORIDE SERPL-SCNC: 100 MMOL/L (ref 94–109)
CO2 SERPL-SCNC: 27 MMOL/L (ref 20–32)
CREAT SERPL-MCNC: 0.88 MG/DL (ref 0.66–1.25)
DIFFERENTIAL METHOD BLD: NORMAL
EOSINOPHIL # BLD AUTO: 0.1 10E9/L (ref 0–0.7)
EOSINOPHIL NFR BLD AUTO: 2 %
ERYTHROCYTE [DISTWIDTH] IN BLOOD BY AUTOMATED COUNT: 12.8 % (ref 10–15)
GFR SERPL CREATININE-BSD FRML MDRD: 86 ML/MIN/{1.73_M2}
GLUCOSE SERPL-MCNC: 89 MG/DL (ref 70–99)
HCT VFR BLD AUTO: 48.3 % (ref 40–53)
HGB BLD-MCNC: 16.4 G/DL (ref 13.3–17.7)
LYMPHOCYTES # BLD AUTO: 3 10E9/L (ref 0.8–5.3)
LYMPHOCYTES NFR BLD AUTO: 42.7 %
MCH RBC QN AUTO: 31.9 PG (ref 26.5–33)
MCHC RBC AUTO-ENTMCNC: 34 G/DL (ref 31.5–36.5)
MCV RBC AUTO: 94 FL (ref 78–100)
MONOCYTES # BLD AUTO: 0.7 10E9/L (ref 0–1.3)
MONOCYTES NFR BLD AUTO: 10.1 %
NEUTROPHILS # BLD AUTO: 3.2 10E9/L (ref 1.6–8.3)
NEUTROPHILS NFR BLD AUTO: 44.8 %
PLATELET # BLD AUTO: 264 10E9/L (ref 150–450)
POTASSIUM SERPL-SCNC: 4.4 MMOL/L (ref 3.4–5.3)
PROT SERPL-MCNC: 7.3 G/DL (ref 6.8–8.8)
RBC # BLD AUTO: 5.14 10E12/L (ref 4.4–5.9)
SODIUM SERPL-SCNC: 133 MMOL/L (ref 133–144)
WBC # BLD AUTO: 7.1 10E9/L (ref 4–11)

## 2021-05-18 PROCEDURE — 85025 COMPLETE CBC W/AUTO DIFF WBC: CPT | Performed by: INTERNAL MEDICINE

## 2021-05-18 PROCEDURE — 86359 T CELLS TOTAL COUNT: CPT | Performed by: INTERNAL MEDICINE

## 2021-05-18 PROCEDURE — 86360 T CELL ABSOLUTE COUNT/RATIO: CPT | Performed by: INTERNAL MEDICINE

## 2021-05-18 PROCEDURE — 80053 COMPREHEN METABOLIC PANEL: CPT | Performed by: INTERNAL MEDICINE

## 2021-05-18 PROCEDURE — 36415 COLL VENOUS BLD VENIPUNCTURE: CPT | Performed by: INTERNAL MEDICINE

## 2021-05-18 PROCEDURE — 87536 HIV-1 QUANT&REVRSE TRNSCRPJ: CPT | Performed by: INTERNAL MEDICINE

## 2021-05-19 LAB
CD3 CELLS # BLD: 2483 CELLS/UL (ref 603–2990)
CD3 CELLS NFR BLD: 77 % (ref 49–84)
CD3+CD4+ CELLS # BLD: 1176 CELLS/UL (ref 441–2156)
CD3+CD4+ CELLS NFR BLD: 37 % (ref 28–63)
CD3+CD4+ CELLS/CD3+CD8+ CLL BLD: 0.95 % (ref 1.4–2.6)
CD3+CD8+ CELLS # BLD: 1267 CELLS/UL (ref 125–1312)
CD3+CD8+ CELLS NFR BLD: 39 % (ref 10–40)
IFC SPECIMEN: ABNORMAL

## 2021-05-20 LAB
HIV1 RNA # PLAS NAA DL=20: 21 {COPIES}/ML
HIV1 RNA SERPL NAA+PROBE-LOG#: 1.3 {LOG_COPIES}/ML

## 2021-06-08 DIAGNOSIS — Z21 HIV (HUMAN IMMUNODEFICIENCY VIRUS INFECTION) (H): ICD-10-CM

## 2021-06-08 RX ORDER — ELVITEGRAVIR, COBICISTAT, EMTRICITABINE, AND TENOFOVIR DISOPROXIL FUMARATE 150; 150; 200; 300 MG/1; MG/1; MG/1; MG/1
1 TABLET, FILM COATED ORAL
Qty: 30 TABLET | Refills: 6 | Status: SHIPPED | OUTPATIENT
Start: 2021-06-08 | End: 2022-01-03

## 2021-08-08 ENCOUNTER — HEALTH MAINTENANCE LETTER (OUTPATIENT)
Age: 71
End: 2021-08-08

## 2021-08-20 ENCOUNTER — TELEPHONE (OUTPATIENT)
Dept: INFECTIOUS DISEASES | Facility: CLINIC | Age: 71
End: 2021-08-20

## 2021-08-20 DIAGNOSIS — Z21 HIV INFECTION, UNSPECIFIED SYMPTOM STATUS (H): Primary | ICD-10-CM

## 2021-08-20 DIAGNOSIS — I10 ESSENTIAL HYPERTENSION: ICD-10-CM

## 2021-08-20 RX ORDER — LISINOPRIL 40 MG/1
40 TABLET ORAL DAILY
Qty: 90 TABLET | Refills: 3 | Status: CANCELLED | OUTPATIENT
Start: 2021-08-20

## 2021-08-20 RX ORDER — LISINOPRIL 40 MG/1
40 TABLET ORAL DAILY
Qty: 30 TABLET | Refills: 0 | Status: SHIPPED | OUTPATIENT
Start: 2021-08-20 | End: 2021-09-23

## 2021-08-20 NOTE — TELEPHONE ENCOUNTER
Pt calling requesting refill for lisinopril.  He will call back to schedule annual physical.  Barbara Mccracken RN

## 2021-08-20 NOTE — TELEPHONE ENCOUNTER
[facility] Call Center    Phone Message    May a detailed message be left on voicemail: yes     Reason for Call: Other: Pt is calling for next care steps.  Pt stated his labs havve been good and provider is out of town.  Pt has a lab  appt scheduled, but provider won't be available for months after.  Should he wait and schedule labs later?  Pt would really like some guidance from care team.  Please follow up.  He sent a message and hasn't received any feedback.      Action Taken: Message routed to:  [facility] ID    Travel Screening: Not Applicable

## 2021-08-20 NOTE — TELEPHONE ENCOUNTER
PN,  Please auth in JF's absence if appropriate.  He says he will call back to schedule annual physical.  Please authorize if appropriate.  Thanks,  Barbara Mccracken RN      Routing refill request to provider for review/approval because:  Labs out of range:  BP      Triage- Pt does not need a call back

## 2021-08-20 NOTE — TELEPHONE ENCOUNTER
Spoke with patient regarding this-- he will have labs completed 8/24 (as per MD note) and will touch base with provider regarding future clinic appt. Pt verbalized understanding of plan.

## 2021-08-24 ENCOUNTER — LAB (OUTPATIENT)
Dept: LAB | Facility: CLINIC | Age: 71
End: 2021-08-24
Payer: COMMERCIAL

## 2021-08-24 DIAGNOSIS — Z21 HIV INFECTION, UNSPECIFIED SYMPTOM STATUS (H): ICD-10-CM

## 2021-08-24 LAB
BASOPHILS # BLD AUTO: 0 10E3/UL (ref 0–0.2)
BASOPHILS NFR BLD AUTO: 0 %
EOSINOPHIL # BLD AUTO: 0.3 10E3/UL (ref 0–0.7)
EOSINOPHIL NFR BLD AUTO: 4 %
ERYTHROCYTE [DISTWIDTH] IN BLOOD BY AUTOMATED COUNT: 12.4 % (ref 10–15)
HCT VFR BLD AUTO: 50 % (ref 40–53)
HGB BLD-MCNC: 17.1 G/DL (ref 13.3–17.7)
LYMPHOCYTES # BLD AUTO: 2.5 10E3/UL (ref 0.8–5.3)
LYMPHOCYTES NFR BLD AUTO: 35 %
MCH RBC QN AUTO: 32.5 PG (ref 26.5–33)
MCHC RBC AUTO-ENTMCNC: 34.2 G/DL (ref 31.5–36.5)
MCV RBC AUTO: 95 FL (ref 78–100)
MONOCYTES # BLD AUTO: 0.7 10E3/UL (ref 0–1.3)
MONOCYTES NFR BLD AUTO: 10 %
NEUTROPHILS # BLD AUTO: 3.6 10E3/UL (ref 1.6–8.3)
NEUTROPHILS NFR BLD AUTO: 51 %
PLATELET # BLD AUTO: 255 10E3/UL (ref 150–450)
RBC # BLD AUTO: 5.26 10E6/UL (ref 4.4–5.9)
WBC # BLD AUTO: 7.1 10E3/UL (ref 4–11)

## 2021-08-24 PROCEDURE — 80053 COMPREHEN METABOLIC PANEL: CPT

## 2021-08-24 PROCEDURE — 85025 COMPLETE CBC W/AUTO DIFF WBC: CPT

## 2021-08-24 PROCEDURE — 87536 HIV-1 QUANT&REVRSE TRNSCRPJ: CPT

## 2021-08-24 PROCEDURE — 86360 T CELL ABSOLUTE COUNT/RATIO: CPT

## 2021-08-24 PROCEDURE — 36415 COLL VENOUS BLD VENIPUNCTURE: CPT

## 2021-08-24 PROCEDURE — 86359 T CELLS TOTAL COUNT: CPT

## 2021-08-25 LAB
CD3 CELLS # BLD: 2039 CELLS/UL (ref 603–2990)
CD3 CELLS NFR BLD: 77 % (ref 49–84)
CD3+CD4+ CELLS # BLD: 942 CELLS/UL (ref 441–2156)
CD3+CD4+ CELLS NFR BLD: 36 % (ref 28–63)
CD3+CD4+ CELLS/CD3+CD8+ CLL BLD: 0.89 % (ref 1.4–2.6)
CD3+CD8+ CELLS # BLD: 1060 CELLS/UL (ref 125–1312)
CD3+CD8+ CELLS NFR BLD: 40 % (ref 10–40)
T CELL COMMENT: ABNORMAL

## 2021-08-26 LAB
ALBUMIN SERPL-MCNC: 4.1 G/DL (ref 3.4–5)
ALP SERPL-CCNC: 83 U/L (ref 40–150)
ALT SERPL W P-5'-P-CCNC: 24 U/L (ref 0–70)
ANION GAP SERPL CALCULATED.3IONS-SCNC: 8 MMOL/L (ref 3–14)
AST SERPL W P-5'-P-CCNC: 26 U/L (ref 0–45)
BILIRUB SERPL-MCNC: 0.7 MG/DL (ref 0.2–1.3)
BUN SERPL-MCNC: 10 MG/DL (ref 7–30)
CALCIUM SERPL-MCNC: 9.3 MG/DL (ref 8.5–10.1)
CHLORIDE BLD-SCNC: 98 MMOL/L (ref 94–109)
CO2 SERPL-SCNC: 25 MMOL/L (ref 20–32)
CREAT SERPL-MCNC: 0.96 MG/DL (ref 0.66–1.25)
GFR SERPL CREATININE-BSD FRML MDRD: 79 ML/MIN/1.73M2
GLUCOSE BLD-MCNC: 74 MG/DL (ref 70–99)
POTASSIUM BLD-SCNC: 4.9 MMOL/L (ref 3.4–5.3)
PROT SERPL-MCNC: 7.4 G/DL (ref 6.8–8.8)
SODIUM SERPL-SCNC: 131 MMOL/L (ref 133–144)

## 2021-08-27 ENCOUNTER — TELEPHONE (OUTPATIENT)
Dept: INFECTIOUS DISEASES | Facility: CLINIC | Age: 71
End: 2021-08-27

## 2021-08-27 LAB
HIV1 RNA # PLAS NAA DL=20: <20 COPIES/ML
HIV1 RNA SERPL NAA+PROBE-LOG#: <1.3 {LOG_COPIES}/ML

## 2021-08-27 NOTE — TELEPHONE ENCOUNTER
[facility] Call Center    Phone Message    May a detailed message be left on voicemail: yes     Reason for Call: Other: Pt had labs done on 8/24/21, and per Pt his number are off. Pt knows that Dr. Dacosta may not be available yet, but he would like one of the providers to review his results and then call him back to let him know what his next steps should be. Pt is wondering if he needs to be seen sooner than when Praneeth will be next available. Please review, and call Pt back.     Action Taken: Message routed to:  [facility] id    Travel Screening: Not Applicable

## 2021-08-30 NOTE — TELEPHONE ENCOUNTER
Called patient, reviewed lab results- patient is concerned viral load not being done correctly, will discuss with provider.     Scheduled 10/21 at 9am in-person with Dr. Emerson. Pt verbalized understanding.

## 2021-09-09 ENCOUNTER — OFFICE VISIT (OUTPATIENT)
Dept: SURGERY | Facility: CLINIC | Age: 71
End: 2021-09-09
Payer: COMMERCIAL

## 2021-09-09 VITALS
OXYGEN SATURATION: 99 % | TEMPERATURE: 97.9 F | HEART RATE: 64 BPM | DIASTOLIC BLOOD PRESSURE: 87 MMHG | BODY MASS INDEX: 28.47 KG/M2 | WEIGHT: 198.9 LBS | HEIGHT: 70 IN | SYSTOLIC BLOOD PRESSURE: 153 MMHG

## 2021-09-09 DIAGNOSIS — K62.82 ANAL DYSPLASIA: Primary | ICD-10-CM

## 2021-09-09 PROCEDURE — 46601 DIAGNOSTIC ANOSCOPY: CPT | Performed by: NURSE PRACTITIONER

## 2021-09-09 ASSESSMENT — MIFFLIN-ST. JEOR: SCORE: 1667.42

## 2021-09-09 ASSESSMENT — PAIN SCALES - GENERAL: PAINLEVEL: NO PAIN (0)

## 2021-09-09 NOTE — NURSING NOTE
"Chief Complaint   Patient presents with     Follow Up     High resolution anoscopy follow up.       Vitals:    09/09/21 1302   BP: (!) 153/87   BP Location: Left arm   Patient Position: Sitting   Cuff Size: Adult Regular   Pulse: 64   Temp: 97.9  F (36.6  C)   TempSrc: Oral   SpO2: 99%   Weight: 198 lb 14.4 oz   Height: 5' 10.25\"       Body mass index is 28.34 kg/m .          Flaquita Luna CMA    "

## 2021-09-09 NOTE — PROGRESS NOTES
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    HPI: Zac is not a smoker. His HIV is under good control. He has no anorectal complaints and no other concerns today.   His underwent a colonoscopy on 17 with one tubular adenoma and repeat recommended after 5 years. His brother has recurrent bladder cancer but is doing well.      ASSESSMENT: Written, informed consent was obtained prior to procedure.  Prior to the start of the procedure and with procedural staff participation, I verbally confirmed the patient s identity using two indicators, relevant allergies, that the procedure was appropriate and matched the consent or emergent situation, and that the correct equipment/implants were available. Immediately prior to starting the procedure I conducted the Time Out with the procedural staff and re-confirmed the patient s name, procedure, and site/side. (The Joint Commission universal protocol was followed.)  Yes    Sedation (Moderate or Deep): None    Anal cytology was not obtained today. Digital anal rectal exam was performed without any palpable lesions. Dilute acetic acid soak was completed for 2 minutes. Lubricant was used to insert the anoscope. Performed high resolution microscopy using the colposcope. The dentate line was viewed in its entirety. Some mild acetowhitening throughout the anal canal without bright acetowhitening or coarse punctation. No verruciform lesions. Perianal exam after acetic acid soak without any verruciform lesions, punctation, or acetowhitening.    PLAN: No evidence of high grade dysplasia on exam today. Repeat high resolution anoscopy in 6 months.  Repeat  "colonoscopy in 2022.  Patient's questions were answered to his stated satisfaction and he is in agreement with this plan.    For details of past medical history, surgical history, family history, medications, allergies, and review of systems, please see details below.    Medical history:  No past medical history on file.    Surgical history:  No past surgical history on file.    Family history:  Family History   Problem Relation Age of Onset     Cervical Cancer Mother      Lymphoma Mother      Other Cancer Mother      Myocardial Infarction Father      Heart Disease Brother      Coronary Artery Disease Brother        Medications:  Current Outpatient Medications   Medication Sig Dispense Refill     lisinopril (ZESTRIL) 40 MG tablet Take 1 tablet (40 mg) by mouth daily 30 tablet 0     STRIBILD [phone] mg tab Take 1 tablet by mouth daily with food 30 tablet 6     fluconazole (DIFLUCAN) 200 MG tablet Take 1 tablet by mouth daily. (Patient not taking: Reported on 10/16/2020) 90 tablet 3     ketoconazole (NIZORAL) 2 % external cream Apply topically daily (Patient not taking: Reported on 10/16/2020) 15 g 11     Allergies:  The patientis allergic to amoxicillin.    Social history:  Social History     Tobacco Use     Smoking status: Never Smoker     Smokeless tobacco: Never Used   Substance Use Topics     Alcohol use: Yes     Alcohol/week: 0.0 standard drinks     Comment: 3x yr     Marital status: single.    Review of Systems:  Nursing Notes:   Flaquita Baeza CMA  9/9/2021  1:38 PM  Signed  Chief Complaint   Patient presents with     Follow Up     High resolution anoscopy follow up.       Vitals:    09/09/21 1302   BP: (!) 153/87   BP Location: Left arm   Patient Position: Sitting   Cuff Size: Adult Regular   Pulse: 64   Temp: 97.9  F (36.6  C)   TempSrc: Oral   SpO2: 99%   Weight: 198 lb 14.4 oz   Height: 5' 10.25\"       Body mass index is 28.34 kg/m .          Flaquita Luna CMA         This " procedure was performed under a collaborative agreement with Dr. Mikael Prabhakar MD, Chief of Colon and Rectal Surgery, [facility].    GIO MarquezC  Colon and Rectal Surgery  [facility]

## 2021-09-09 NOTE — LETTER
2021       RE: Zac Rosenthal  [address]  [facility] [address]     Dear Colleague,    Thank you for referring your patient, Zac Rosenthal, to the [facility] COLON AND RECTAL SURGERY CLINIC [place] at [facility]. Please see a copy of my visit note below.      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    HPI: Zac is not a smoker. His HIV is under good control. He has no anorectal complaints and no other concerns today.   His underwent a colonoscopy on 17 with one tubular adenoma and repeat recommended after 5 years. His brother has recurrent bladder cancer but is doing well.      ASSESSMENT: Written, informed consent was obtained prior to procedure.  Prior to the start of the procedure and with procedural staff participation, I verbally confirmed the patient s identity using two indicators, relevant allergies, that the procedure was appropriate and matched the consent or emergent situation, and that the correct equipment/implants were available. Immediately prior to starting the procedure I conducted the Time Out with the procedural staff and re-confirmed the patient s name, procedure, and site/side. (The Joint Commission universal protocol was followed.)  Yes    Sedation (Moderate or Deep): None    Anal cytology was not obtained today. Digital anal rectal exam was performed without any palpable lesions. Dilute acetic acid soak was completed for 2 minutes. Lubricant was used to insert the anoscope. Performed high resolution microscopy using the colposcope. The dentate line was viewed in its entirety. Some mild  acetowhitening throughout the anal canal without bright acetowhitening or coarse punctation. No verruciform lesions. Perianal exam after acetic acid soak without any verruciform lesions, punctation, or acetowhitening.    PLAN: No evidence of high grade dysplasia on exam today. Repeat high resolution anoscopy in 6 months.  Repeat colonoscopy in 2022.  Patient's questions were answered to his stated satisfaction and he is in agreement with this plan.    For details of past medical history, surgical history, family history, medications, allergies, and review of systems, please see details below.    Medical history:  No past medical history on file.    Surgical history:  No past surgical history on file.    Family history:  Family History   Problem Relation Age of Onset     Cervical Cancer Mother      Lymphoma Mother      Other Cancer Mother      Myocardial Infarction Father      Heart Disease Brother      Coronary Artery Disease Brother        Medications:  Current Outpatient Medications   Medication Sig Dispense Refill     lisinopril (ZESTRIL) 40 MG tablet Take 1 tablet (40 mg) by mouth daily 30 tablet 0     STRIBILD [id number] mg tab Take 1 tablet by mouth daily with food 30 tablet 6     fluconazole (DIFLUCAN) 200 MG tablet Take 1 tablet by mouth daily. (Patient not taking: Reported on 10/16/2020) 90 tablet 3     ketoconazole (NIZORAL) 2 % external cream Apply topically daily (Patient not taking: Reported on 10/16/2020) 15 g 11     Allergies:  The patientis allergic to amoxicillin.    Social history:  Social History     Tobacco Use     Smoking status: Never Smoker     Smokeless tobacco: Never Used   Substance Use Topics     Alcohol use: Yes     Alcohol/week: 0.0 standard drinks     Comment: 3x yr     Marital status: single.    Review of Systems:  Nursing Notes:   Flaquita Baeza CMA  9/9/2021  1:38 PM  Signed  Chief Complaint   Patient presents with     Follow Up     High resolution anoscopy follow  "up.       Vitals:    09/09/21 1302   BP: (!) 153/87   BP Location: Left arm   Patient Position: Sitting   Cuff Size: Adult Regular   Pulse: 64   Temp: 97.9  F (36.6  C)   TempSrc: Oral   SpO2: 99%   Weight: 198 lb 14.4 oz   Height: 5' 10.25\"     Body mass index is 28.34 kg/m .    Flaquita Luna CMA        This procedure was performed under a collaborative agreement with Dr. Mikael Prabhakar MD, Chief of Colon and Rectal Surgery, [facility].    Rosangela Kaur NP-C  Colon and Rectal Surgery  [facility]      Again, thank you for allowing me to participate in the care of your patient.      Sincerely,    Rosangela Kaur, APRN CNP  "

## 2021-09-21 NOTE — PATIENT INSTRUCTIONS
Patient Education   Personalized Prevention Plan  You are due for the preventive services outlined below.  Your care team is available to assist you in scheduling these services.  If you have already completed any of these items, please share that information with your care team to update in your medical record.  Health Maintenance Due   Topic Date Due     ANNUAL REVIEW OF HM ORDERS  Never done     AORTIC ANEURYSM SCREENING (SYSTEM ASSIGNED)  Never done     Meningitis A Vaccine (3 - Risk 2-dose series) 09/21/2020     Flu Vaccine (1) 09/01/2021     FALL RISK ASSESSMENT  09/10/2021     Annual Wellness Visit  09/10/2021

## 2021-09-21 NOTE — PROGRESS NOTES
"SUBJECTIVE:   Zac Rosenthal is a 70 year old male who presents for Preventive Visit.    Patient has been advised of split billing requirements and indicates understanding: Yes   Are you in the first 12 months of your Medicare coverage?  No    Healthy Habits:     In general, how would you rate your overall health?  Good    Frequency of exercise:  6-7 days/week    Duration of exercise:  N/A    Do you usually eat at least 4 servings of fruit and vegetables a day, include whole grains    & fiber and avoid regularly eating high fat or \"junk\" foods?  Yes    Taking medications regularly:  No    Medication side effects:  None    Ability to successfully perform activities of daily living:  No assistance needed    Home Safety:  No safety concerns identified    Hearing Impairment:  No hearing concerns    In the past 6 months, have you been bothered by leaking of urine?  No    In general, how would you rate your overall mental or emotional health?  Good      PHQ-2 Total Score: 0    Additional concerns today:  No    Pt declines flu shot today, will get it in a week.     Do you feel safe in your environment? Yes    Have you ever done Advance Care Planning? (For example, a Health Directive, POLST, or a discussion with a medical provider or your loved ones about your wishes): Yes, patient states has an Advance Care Planning document and will bring a copy to the clinic.       Fall risk  Fallen 2 or more times in the past year?: No  Any fall with injury in the past year?: No    Cognitive Screening   1) Repeat 3 items (Leader, Season, Table)    2) Clock draw: NORMAL  3) 3 item recall: Recalls 3 objects  Results: NORMAL clock, 1-2 items recalled: COGNITIVE IMPAIRMENT LESS LIKELY    Mini-CogTM Copyright BRIAN Tamez. Licensed by the author for use in [facility]; reprinted with permission (tammy@.[facility]). All rights reserved.      Do you have sleep apnea, excessive snoring or daytime drowsiness?: no    Reviewed and updated as " needed this visit by clinical staff  Tobacco  Allergies  Meds   Med Hx  Surg Hx  Fam Hx  Soc Hx        Reviewed and updated as needed this visit by Provider                Social History     Tobacco Use     Smoking status: Never Smoker     Smokeless tobacco: Never Used   Substance Use Topics     Alcohol use: Yes     Alcohol/week: 0.0 standard drinks     Comment: 3x yr     If you drink alcohol do you typically have >3 drinks per day or >7 drinks per week? No    Alcohol Use 9/23/2021   Prescreen: >3 drinks/day or >7 drinks/week? No   Prescreen: >3 drinks/day or >7 drinks/week? -   No flowsheet data found.        Current providers sharing in care for this patient include:   Patient Care Team:  Francis Cordon MD as PCP - General (Family Practice)  Victor Hugo Dacosta MD as MD (Infectious Diseases)  Francis Cordon MD as Assigned PCP  Victor Hugo Dacosta MD as Assigned Infectious Disease Provider  Rosangela Pritchett APRN CNP as Assigned Surgical Provider    The following health maintenance items are reviewed in Epic and correct as of today:  Health Maintenance Due   Topic Date Due     ANNUAL REVIEW OF HM ORDERS  Never done     AORTIC ANEURYSM SCREENING (SYSTEM ASSIGNED)  Never done     MENINGITIS IMMUNIZATION (3 - Risk 2-dose series) 09/21/2020     INFLUENZA VACCINE (1) 09/01/2021     FALL RISK ASSESSMENT  09/10/2021     Lab work is in process  Labs reviewed in EPIC  BP Readings from Last 3 Encounters:   09/23/21 (!) 150/81   09/09/21 (!) 153/87   04/15/21 (!) 158/91    Wt Readings from Last 3 Encounters:   09/23/21 89.9 kg (198 lb 4.8 oz)   09/09/21 90.2 kg (198 lb 14.4 oz)   04/15/21 91.9 kg (202 lb 8 oz)                  Patient Active Problem List   Diagnosis     Human immunodeficiency virus (HIV) disease (H)     Anal dysplasia     Essential hypertension     History reviewed. No pertinent surgical history.    Social History     Tobacco Use     Smoking status: Never Smoker      Smokeless tobacco: Never Used   Substance Use Topics     Alcohol use: Yes     Alcohol/week: 0.0 standard drinks     Comment: 3x yr     Family History   Problem Relation Age of Onset     Cervical Cancer Mother      Lymphoma Mother      Other Cancer Mother      Myocardial Infarction Father      Heart Disease Brother      Coronary Artery Disease Brother          Current Outpatient Medications   Medication Sig Dispense Refill     lisinopril (ZESTRIL) 40 MG tablet Take 1 tablet (40 mg) by mouth daily 30 tablet 0     STRIBILD [id number] mg tab Take 1 tablet by mouth daily with food 30 tablet 6     fluconazole (DIFLUCAN) 200 MG tablet Take 1 tablet by mouth daily. (Patient not taking: Reported on 10/16/2020) 90 tablet 3     ketoconazole (NIZORAL) 2 % external cream Apply topically daily (Patient not taking: Reported on 10/16/2020) 15 g 11     Allergies   Allergen Reactions     Amoxicillin      Immunization History   Administered Date(s) Administered     COVID-19,PF,Pfizer 03/04/2021, 03/25/2021, 08/20/2021     FLU 6-35 months 10/09/2012, 09/14/2020     Flu, Unspecified 10/15/2003, 10/25/2004, 10/14/2005, 10/19/2006, 10/24/2007, 10/23/2008, 10/09/2009, 10/12/2010     HepA-Adult 06/25/1999, 01/21/2000     HepB-Adult 06/25/1999, 07/28/1999, 01/21/2000, 07/13/2012     Influenza (H1N1) 12/15/2009     Influenza (High Dose) 3 valent vaccine 09/21/2015, 10/01/2016, 10/02/2017, 10/02/2018, 10/08/2019     Influenza (IIV3) PF 10/26/1999, 11/24/2000, 11/15/2001, 10/01/2002     Influenza Vaccine IM > 6 months Valent IIV4 (Alfuria,Fluzone) 11/04/2011, 10/25/2013, 10/14/2014     Influenza Vaccine IM Ages 6-35 Months 4 Valent (PF) 10/25/2013     Influenza, Quad, High Dose, Pf, 65yr+ (Fluzone HD) 09/10/2020     Meningococcal (Menveo ) 07/23/2015, 09/21/2015     Pneumo Conj 13-V (2010&after) 07/24/2013, 08/18/2017     Pneumococcal 23 valent 01/21/2000, 02/06/2009, 11/20/2014     TDAP Vaccine (Adacel) 10/23/2008     TDAP Vaccine  "(Boostrix) 08/18/2017     Td (Adult), Adsorbed 10/01/1998     Zoster vaccine recombinant adjuvanted (SHINGRIX) 09/14/2020, 11/11/2020             Review of Systems   Constitutional: Negative for chills and fever.   HENT: Negative for congestion, ear pain, hearing loss and sore throat.    Eyes: Negative for pain and visual disturbance.   Respiratory: Negative for cough and shortness of breath.    Cardiovascular: Negative for chest pain, palpitations and peripheral edema.   Gastrointestinal: Negative for abdominal pain, constipation, diarrhea, heartburn, hematochezia and nausea.   Genitourinary: Negative for dysuria, frequency, genital sores, hematuria, impotence and urgency.   Musculoskeletal: Negative for arthralgias, joint swelling and myalgias.   Skin: Negative for rash.   Neurological: Negative for dizziness, weakness, headaches and paresthesias.   Psychiatric/Behavioral: Negative for mood changes. The patient is not nervous/anxious.          OBJECTIVE:   BP (!) 150/81 (Patient Position: Sitting, Cuff Size: Adult Regular)   Pulse 60   Temp 97.7  F (36.5  C) (Tympanic)   Resp 22   Ht 1.778 m (5' 10\")   Wt 89.9 kg (198 lb 4.8 oz)   SpO2 99%   BMI 28.45 kg/m   Estimated body mass index is 28.45 kg/m  as calculated from the following:    Height as of this encounter: 1.778 m (5' 10\").    Weight as of this encounter: 89.9 kg (198 lb 4.8 oz).  Physical Exam    General Appearance: Pleasant, alert, in no acute respiratory distress.  Head Exam: Normal. Normocephalic, atraumatic. No sinus tenderness.  Eye Exam: Normal external eye, conjunctiva, lids. ROSY.  Ear Exam: Normal auditory canals and external ears. Non-tender.  Left TM-normal. Right TM-normal.  Neck Exam: Supple, no obvious thyroid enlargement  Chest/Respiratory Exam: Normal, comfortable, easy respirations. Chest wall normal. Lungs are clear to auscultation. No wheezing, crackles, or rhonchi.  Cardiovascular Exam: Regular rate and rhythm. No murmur, gallop, " "or rubs.  Musculoskeletal Exam: Back is non-tender, full ROM of upper and lower extremities.  Skin: no rash, warm and dry.    Neurologic Exam: Nonfocal, no tremor. Normal gait.  Psychiatric Exam: Alert - appropriate, normal affect      ASSESSMENT / PLAN:       ICD-10-CM    1. Encounter for Medicare annual wellness exam  Z00.00    2. Essential hypertension  I10 Lipid Profile (Chol, Trig, HDL, LDL calc)     Basic metabolic panel  (Ca, Cl, CO2, Creat, Gluc, K, Na, BUN)     lisinopril (ZESTRIL) 40 MG tablet   3. Asymptomatic HIV infection (H)  Z21 fluconazole (DIFLUCAN) 200 MG tablet     ketoconazole (NIZORAL) 2 % external cream   4. Dermatitis fungal  B36.9 fluconazole (DIFLUCAN) 200 MG tablet     ketoconazole (NIZORAL) 2 % external cream   5. Human immunodeficiency virus (HIV) disease (H)  B20      Patient has stable chronic conditions as noted    These diagnoses were each reviewed for stability and medications were reviewed and refilled, labs ordered and updated.      Patient has been advised of split billing requirements and indicates understanding: Yes  COUNSELING:  Reviewed preventive health counseling, as reflected in patient instructions       Regular exercise       Healthy diet/nutrition    Estimated body mass index is 28.45 kg/m  as calculated from the following:    Height as of this encounter: 1.778 m (5' 10\").    Weight as of this encounter: 89.9 kg (198 lb 4.8 oz).        He reports that he has never smoked. He has never used smokeless tobacco.      Appropriate preventive services were discussed with this patient, including applicable screening as appropriate for cardiovascular disease, diabetes, osteopenia/osteoporosis, and glaucoma.  As appropriate for age/gender, discussed screening for colorectal cancer, prostate cancer, breast cancer, and cervical cancer. Checklist reviewing preventive services available has been given to the patient.    Reviewed patients plan of care and provided an AVS. The Basic " Care Plan (routine screening as documented in Health Maintenance) for Zac meets the Care Plan requirement. This Care Plan has been established and reviewed with the Patient.    Counseling Resources:  ATP IV Guidelines  Pooled Cohorts Equation Calculator  Breast Cancer Risk Calculator  Breast Cancer: Medication to Reduce Risk  FRAX Risk Assessment  ICSI Preventive Guidelines  Dietary Guidelines for Americans, 2010  USDA's MyPlate  ASA Prophylaxis  Lung CA Screening    Francis Cordon MD  [facility]    Identified Health Risks:

## 2021-09-23 ENCOUNTER — OFFICE VISIT (OUTPATIENT)
Dept: FAMILY MEDICINE | Facility: CLINIC | Age: 71
End: 2021-09-23
Payer: COMMERCIAL

## 2021-09-23 VITALS
HEIGHT: 70 IN | DIASTOLIC BLOOD PRESSURE: 78 MMHG | SYSTOLIC BLOOD PRESSURE: 138 MMHG | OXYGEN SATURATION: 99 % | RESPIRATION RATE: 22 BRPM | WEIGHT: 198.3 LBS | BODY MASS INDEX: 28.39 KG/M2 | TEMPERATURE: 97.7 F | HEART RATE: 60 BPM

## 2021-09-23 DIAGNOSIS — B20 HUMAN IMMUNODEFICIENCY VIRUS (HIV) DISEASE (H): ICD-10-CM

## 2021-09-23 DIAGNOSIS — I10 ESSENTIAL HYPERTENSION: ICD-10-CM

## 2021-09-23 DIAGNOSIS — Z00.00 ENCOUNTER FOR MEDICARE ANNUAL WELLNESS EXAM: Primary | ICD-10-CM

## 2021-09-23 DIAGNOSIS — B36.9 DERMATITIS FUNGAL: ICD-10-CM

## 2021-09-23 DIAGNOSIS — Z21 ASYMPTOMATIC HIV INFECTION (H): ICD-10-CM

## 2021-09-23 LAB
ANION GAP SERPL CALCULATED.3IONS-SCNC: 8 MMOL/L (ref 3–14)
BUN SERPL-MCNC: 9 MG/DL (ref 7–30)
CALCIUM SERPL-MCNC: 8.6 MG/DL (ref 8.5–10.1)
CHLORIDE BLD-SCNC: 98 MMOL/L (ref 94–109)
CHOLEST SERPL-MCNC: 158 MG/DL
CO2 SERPL-SCNC: 24 MMOL/L (ref 20–32)
CREAT SERPL-MCNC: 0.95 MG/DL (ref 0.66–1.25)
FASTING STATUS PATIENT QL REPORTED: YES
GFR SERPL CREATININE-BSD FRML MDRD: 80 ML/MIN/1.73M2
GLUCOSE BLD-MCNC: 88 MG/DL (ref 70–99)
HDLC SERPL-MCNC: 52 MG/DL
LDLC SERPL CALC-MCNC: 91 MG/DL
NONHDLC SERPL-MCNC: 106 MG/DL
POTASSIUM BLD-SCNC: 4.2 MMOL/L (ref 3.4–5.3)
SODIUM SERPL-SCNC: 130 MMOL/L (ref 133–144)
TRIGL SERPL-MCNC: 75 MG/DL

## 2021-09-23 PROCEDURE — 80061 LIPID PANEL: CPT | Performed by: FAMILY MEDICINE

## 2021-09-23 PROCEDURE — 99397 PER PM REEVAL EST PAT 65+ YR: CPT | Performed by: FAMILY MEDICINE

## 2021-09-23 PROCEDURE — 36415 COLL VENOUS BLD VENIPUNCTURE: CPT | Performed by: FAMILY MEDICINE

## 2021-09-23 PROCEDURE — 80048 BASIC METABOLIC PNL TOTAL CA: CPT | Performed by: FAMILY MEDICINE

## 2021-09-23 RX ORDER — LISINOPRIL 40 MG/1
40 TABLET ORAL DAILY
Qty: 90 TABLET | Refills: 3 | Status: SHIPPED | OUTPATIENT
Start: 2021-09-23 | End: 2022-09-12

## 2021-09-23 RX ORDER — FLUCONAZOLE 200 MG/1
TABLET ORAL
Qty: 90 TABLET | Refills: 3 | Status: SHIPPED | OUTPATIENT
Start: 2021-09-23 | End: 2022-09-12

## 2021-09-23 RX ORDER — KETOCONAZOLE 20 MG/G
CREAM TOPICAL DAILY
Qty: 15 G | Refills: 11 | Status: SHIPPED | OUTPATIENT
Start: 2021-09-23 | End: 2022-09-12

## 2021-09-23 ASSESSMENT — ENCOUNTER SYMPTOMS
DYSURIA: 0
PARESTHESIAS: 0
FEVER: 0
MYALGIAS: 0
HEMATURIA: 0
HEMATOCHEZIA: 0
WEAKNESS: 0
DIZZINESS: 0
NAUSEA: 0
CONSTIPATION: 0
HEADACHES: 0
JOINT SWELLING: 0
CHILLS: 0
COUGH: 0
FREQUENCY: 0
HEARTBURN: 0
SORE THROAT: 0
DIARRHEA: 0
ARTHRALGIAS: 0
PALPITATIONS: 0
EYE PAIN: 0
ABDOMINAL PAIN: 0
NERVOUS/ANXIOUS: 0
SHORTNESS OF BREATH: 0

## 2021-09-23 ASSESSMENT — ACTIVITIES OF DAILY LIVING (ADL): CURRENT_FUNCTION: NO ASSISTANCE NEEDED

## 2021-09-23 ASSESSMENT — MIFFLIN-ST. JEOR: SCORE: 1660.73

## 2021-09-30 ENCOUNTER — MYC MEDICAL ADVICE (OUTPATIENT)
Dept: FAMILY MEDICINE | Facility: CLINIC | Age: 71
End: 2021-09-30

## 2021-10-21 ENCOUNTER — OFFICE VISIT (OUTPATIENT)
Dept: INFECTIOUS DISEASES | Facility: CLINIC | Age: 71
End: 2021-10-21
Attending: STUDENT IN AN ORGANIZED HEALTH CARE EDUCATION/TRAINING PROGRAM
Payer: COMMERCIAL

## 2021-10-21 VITALS
BODY MASS INDEX: 28.16 KG/M2 | DIASTOLIC BLOOD PRESSURE: 80 MMHG | OXYGEN SATURATION: 98 % | WEIGHT: 196.23 LBS | HEART RATE: 57 BPM | TEMPERATURE: 97.9 F | SYSTOLIC BLOOD PRESSURE: 156 MMHG

## 2021-10-21 DIAGNOSIS — Z21 HUMAN IMMUNODEFICIENCY VIRUS I INFECTION (H): Primary | ICD-10-CM

## 2021-10-21 PROCEDURE — 99213 OFFICE O/P EST LOW 20 MIN: CPT | Performed by: STUDENT IN AN ORGANIZED HEALTH CARE EDUCATION/TRAINING PROGRAM

## 2021-10-21 NOTE — PATIENT INSTRUCTIONS
Follow up with me in 6 months  Labs one week prior  Continue current medications  Please call if any questions in the interim

## 2021-10-21 NOTE — PROGRESS NOTES
Infectious Disease Clinic Follow Up Visit    HPI:  Zac Rosenthal is a 70 year old male who is here for follow-up on HIV treatment.  He was previously followed by dr michelle Dacosta, I am taking over care today as Dr Dacosta has retired from clinical duties.     He is currently taking Stribild once a day without missing doses and no side effects. Chronic diarrhea now ,uch better after changing his diet.     Had a high resolution anoscopy in May 2018, August 2018, and Feb 2019.  The cytology from Feb 2019 showed no abnormality.  Also had a colonoscopy which showed a tubular adenoma in April 2017.Colonoscopy recommended after 5 years from then. Repeat colonoscopy planned in 2022. Follows with CRS, in Sep 2021 no evidence of high grade dysplasia. Plan for repeat high resolution anoscopy in 6 months.     He has an established primary care with family medicine, Dr. Francis Cordon, who has him on lisinpril for hypertension.    ROS:   ROS negative except as described above.      Medications:  Current Outpatient Medications   Medication Sig Dispense Refill     fluconazole (DIFLUCAN) 200 MG tablet Take 1 tablet by mouth daily. 90 tablet 3     ketoconazole (NIZORAL) 2 % external cream Apply topically daily 15 g 11     lisinopril (ZESTRIL) 40 MG tablet Take 1 tablet (40 mg) by mouth daily 90 tablet 3     STRIBILD [id number] mg tab Take 1 tablet by mouth daily with food 30 tablet 6       Exam:  BP (!) 156/80   Pulse 57   Temp 97.9  F (36.6  C) (Oral)   Wt 89 kg (196 lb 3.7 oz)   SpO2 98%   BMI 28.16 kg/m      Physical Exam   Constitutional: He is well-developed, well-nourished, and in no distress. No difficulty breathing or speaking.  No cough  Neurological: He is alert.   Psychiatric: Affect and judgment normal.   RS: CTAB  CVS: S1 S2 normal, RRR, No RMG  Skin: No obvious rashes    T Cell Subset:  Absolute CD4   Date Value Ref Range Status   05/18/2021 1,176 441 - 2,156 cells/uL Final     Absolute CD4, [place] T Cells    Date Value Ref Range Status   08/24/2021 942 441-2,156 cells/uL Final     CD4 [place] T   Date Value Ref Range Status   05/18/2021 37 28 - 63 % Final     CD4% [place] T Cells   Date Value Ref Range Status   08/24/2021 36 28 - 63 % Final     CD8 Suppressor T   Date Value Ref Range Status   05/18/2021 39 10 - 40 % Final     CD8% Suppressor T Cells   Date Value Ref Range Status   08/24/2021 40 10 - 40 % Final     CD3 Mature T   Date Value Ref Range Status   05/18/2021 77 49 - 84 % Final     CD3% Total T Cells   Date Value Ref Range Status   08/24/2021 77 49 - 84 % Final     CD4:CD8 Ratio   Date Value Ref Range Status   08/24/2021 0.89 (L) 1.40 - 2.60 Final   05/18/2021 0.95 (L) 1.40 - 2.60 Final     WBC   Date Value Ref Range Status   05/18/2021 7.1 4.0 - 11.0 10e9/L Final     WBC Count   Date Value Ref Range Status   08/24/2021 7.1 4.0 - 11.0 10e3/uL Final     % Lymphocytes   Date Value Ref Range Status   08/24/2021 35 % Final   05/18/2021 42.7 % Final     Absolute CD3   Date Value Ref Range Status   05/18/2021 2,483 603 - 2,990 cells/uL Final     Absolute CD3, Total T Cells   Date Value Ref Range Status   08/24/2021 2,039 603-2,990 cells/uL Final     Absolute CD8   Date Value Ref Range Status   05/18/2021 1,267 125 - 1,312 cells/uL Final     Absolute CD8, Suppressor T Cells   Date Value Ref Range Status   08/24/2021 1,060 125-1,312 cells/uL Final       HIV-1 RNA Quantitative:  HIV-1 RNA Quant Result   Date Value Ref Range Status   05/18/2021 21 (A) HIVND^HIV-1 RNA Not Detected [Copies]/mL Final     Comment:     The ANAT AmpliPrep/ANAT TaqMan HIV-1 test is an FDA-approved in vitro   nucleic acid amplification test for the quantitation of HIV-1 RNA in human   plasma (EDTA plasma) using the ANAT AmpliPrep instrument for automated viral   nucleic acid extraction and the ANAT TaqMan Analyzer or ANAT TaqMan for   automated Real Time PCR amplification and detection of the viral nucleic acid   target.  Titer results are  reported in copies/ml. This assay is intended for use in   conjunction with clinical presentation and other laboratory markers of disease   prognosis and for use as an aid in assessing viral response to antiretroviral   treatment as measured by changes in plasma HIV-1 RNA levels. This test should   not be used as a donor screening test to confirm the presence of HIV-1   infection.       HIV-1 RNA copies/mL   Date Value Ref Range Status   08/24/2021 <20 (A) Not Detected Copies/mL Final     HIV RNA QT Log   Date Value Ref Range Status   05/18/2021 1.3 (H) <1.3 [Log_copies]/mL Final     HIV-1 log   Date Value Ref Range Status   08/24/2021 <1.3  Final       Lab Results   Component Value Date    WBC 7.1 08/24/2021    WBC 7.1 05/18/2021     Lab Results   Component Value Date    RBC 5.26 08/24/2021    RBC 5.14 05/18/2021     Lab Results   Component Value Date    HGB 17.1 08/24/2021    HGB 16.4 05/18/2021     Lab Results   Component Value Date    HCT 50.0 08/24/2021    HCT 48.3 05/18/2021     No components found for: MCT  Lab Results   Component Value Date    MCV 95 08/24/2021    MCV 94 05/18/2021     Lab Results   Component Value Date    MCH 32.5 08/24/2021    MCH 31.9 05/18/2021     Lab Results   Component Value Date    MCHC 34.2 08/24/2021    MCHC 34.0 05/18/2021     Lab Results   Component Value Date    RDW 12.4 08/24/2021    RDW 12.8 05/18/2021     Lab Results   Component Value Date     08/24/2021     05/18/2021       Last Comprehensive Metabolic Panel:  Sodium   Date Value Ref Range Status   09/23/2021 130 (L) 133 - 144 mmol/L Final   05/18/2021 133 133 - 144 mmol/L Final     Potassium   Date Value Ref Range Status   09/23/2021 4.2 3.4 - 5.3 mmol/L Final   05/18/2021 4.4 3.4 - 5.3 mmol/L Final     Chloride   Date Value Ref Range Status   09/23/2021 98 94 - 109 mmol/L Final   05/18/2021 100 94 - 109 mmol/L Final     Carbon Dioxide   Date Value Ref Range Status   05/18/2021 27 20 - 32 mmol/L Final      Carbon Dioxide (CO2)   Date Value Ref Range Status   09/23/2021 24 20 - 32 mmol/L Final     Anion Gap   Date Value Ref Range Status   09/23/2021 8 3 - 14 mmol/L Final   05/18/2021 6 3 - 14 mmol/L Final     Glucose   Date Value Ref Range Status   09/23/2021 88 70 - 99 mg/dL Final   05/18/2021 89 70 - 99 mg/dL Final     Urea Nitrogen   Date Value Ref Range Status   09/23/2021 9 7 - 30 mg/dL Final   05/18/2021 12 7 - 30 mg/dL Final     Creatinine   Date Value Ref Range Status   09/23/2021 0.95 0.66 - 1.25 mg/dL Final   05/18/2021 0.88 0.66 - 1.25 mg/dL Final     GFR Estimate   Date Value Ref Range Status   09/23/2021 80 >60 mL/min/1.73m2 Final     Comment:     As of July 11, 2021, eGFR is calculated by the CKD-EPI creatinine equation, without race adjustment. eGFR can be influenced by muscle mass, exercise, and diet. The reported eGFR is an estimation only and is only applicable if the renal function is stable.   05/18/2021 86 >60 mL/min/[1.73_m2] Final     Comment:     Non  GFR Calc  Starting 12/18/2018, serum creatinine based estimated GFR (eGFR) will be   calculated using the Chronic Kidney Disease Epidemiology Collaboration   (CKD-EPI) equation.       Calcium   Date Value Ref Range Status   09/23/2021 8.6 8.5 - 10.1 mg/dL Final   05/18/2021 9.3 8.5 - 10.1 mg/dL Final     Bilirubin Total   Date Value Ref Range Status   08/24/2021 0.7 0.2 - 1.3 mg/dL Final   05/18/2021 0.6 0.2 - 1.3 mg/dL Final     Alkaline Phosphatase   Date Value Ref Range Status   08/24/2021 83 40 - 150 U/L Final   05/18/2021 88 40 - 150 U/L Final     ALT   Date Value Ref Range Status   08/24/2021 24 0 - 70 U/L Final   05/18/2021 20 0 - 70 U/L Final     AST   Date Value Ref Range Status   08/24/2021 26 0 - 45 U/L Final   05/18/2021 21 0 - 45 U/L Final       Assessment and Plan:  Zac Rosenthal is a 70 year old male who is here for followup on HIV treatment.    #HIV: Well controlled on Stribild. No missed doses. Will repeat HIV  labs in 6 months before follow up. Had some blips in viral load in 2/2021 to 188, since then undetectable in May 2021 and 8/2021    #HTN: The patient will follow up with his PCP to address his elevated BP. Continue lisinopril. 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 Subsequent follow-up again in Feb 2019 showed no dysplasia.  At follow up with CRS in 9/2021:  No evidence of high grade dysplasia. Plan for repeat high resolution anoscopy in 6 months.     #Colonic tubular adenoma polyp in April 2017.  Colonoscopy recommended after 5 years from then. Repeat colonoscopy planned in 2022.    #Low sodium.  On review, remains a bit low and stable since at least 2018, he likely just has a chronically low level, would just continue to watch. Diarrhea now improved.       Preventative Medicine  Cardiovascular Fidel:                        Lipids: normal in 9/2021                       Blood Pressure: 156/80, as above  Cancer Screening:                       Colon: planned scope in 2022                       Anal: Planned repeat eval in 6 months  Immunizations:                       Hepatitis A: 1/21/2000, 6/25/1999                       Hepatitis B: 7/13/2012, 1/21/2000, 7/28/1999, 6/25/1999                        Influenza: Done 9/2021                       Pneumovax/Prevnar PPSV 23 11/20/2014, 2/6/2009, 1/21/2000                       PCV 13 8/18/2017, 7/24/2013                       Tdap: 2008, 8/2017                       Td 1998                       Meningococcus: 9/21/2015, 7/23/2015                       Covid: 3 doses done  Bone Health: No screening indicated at this time  Renal Health: Cr: normal  Sexually Transmitted Infection Risk and Screening:                       Gonorrhea and Chlamydia: declined today                       Syphilis: declined today                 Carlos Emerson   of Medicine  Division of Infectious Diseases

## 2021-10-21 NOTE — NURSING NOTE
Chief Complaint   Patient presents with     RECHECK     B20 f/u     Blood pressure (!) 156/80, pulse 57, temperature 97.9  F (36.6  C), temperature source Oral, weight 89 kg (196 lb 3.7 oz), SpO2 98 %.    Angella Ledesma, CMA

## 2021-10-21 NOTE — LETTER
10/21/2021       RE: Zac Rosenthal  [address]  [facility] [address]     Dear Colleague,    Thank you for referring your patient, Zac Rosenthal, to the [facility] [place] at [facility]. Please see a copy of my visit note below.    Infectious Disease Clinic Follow Up Visit    HPI:  Zac Rosenthal is a 70 year old male who is here for follow-up on HIV treatment.  He was previously followed by dr michelle Dacosta, I am taking over care today as Dr Dacosta has retired from clinical duties.     He is currently taking Stribild once a day without missing doses and no side effects. Chronic diarrhea now ,uch better after changing his diet.     Had a high resolution anoscopy in May 2018, August 2018, and Feb 2019.  The cytology from Feb 2019 showed no abnormality.  Also had a colonoscopy which showed a tubular adenoma in April 2017.Colonoscopy recommended after 5 years from then. Repeat colonoscopy planned in 2022. Follows with CRS, in Sep 2021 no evidence of high grade dysplasia. Plan for repeat high resolution anoscopy in 6 months.     He has an established primary care with family medicine, Dr. Francis Cordon, who has him on lisinpril for hypertension.    ROS:   ROS negative except as described above.      Medications:  Current Outpatient Medications   Medication Sig Dispense Refill     fluconazole (DIFLUCAN) 200 MG tablet Take 1 tablet by mouth daily. 90 tablet 3     ketoconazole (NIZORAL) 2 % external cream Apply topically daily 15 g 11     lisinopril (ZESTRIL) 40 MG tablet Take 1 tablet (40 mg) by mouth daily 90 tablet 3     STRIBILD [phone] mg tab Take 1 tablet by mouth daily with food 30 tablet 6       Exam:  BP (!) 156/80   Pulse 57   Temp 97.9  F (36.6  C) (Oral)   Wt 89 kg (196 lb 3.7 oz)   SpO2 98%   BMI 28.16 kg/m      Physical Exam   Constitutional: He is well-developed, well-nourished, and in no  distress. No difficulty breathing or speaking.  No cough  Neurological: He is alert.   Psychiatric: Affect and judgment normal.   RS: CTAB  CVS: S1 S2 normal, RRR, No RMG  Skin: No obvious rashes    T Cell Subset:  Absolute CD4   Date Value Ref Range Status   05/18/2021 1,176 441 - 2,156 cells/uL Final     Absolute CD4, [place] T Cells   Date Value Ref Range Status   08/24/2021 942 441-2,156 cells/uL Final     CD4 [place] T   Date Value Ref Range Status   05/18/2021 37 28 - 63 % Final     CD4% [place] T Cells   Date Value Ref Range Status   08/24/2021 36 28 - 63 % Final     CD8 Suppressor T   Date Value Ref Range Status   05/18/2021 39 10 - 40 % Final     CD8% Suppressor T Cells   Date Value Ref Range Status   08/24/2021 40 10 - 40 % Final     CD3 Mature T   Date Value Ref Range Status   05/18/2021 77 49 - 84 % Final     CD3% Total T Cells   Date Value Ref Range Status   08/24/2021 77 49 - 84 % Final     CD4:CD8 Ratio   Date Value Ref Range Status   08/24/2021 0.89 (L) 1.40 - 2.60 Final   05/18/2021 0.95 (L) 1.40 - 2.60 Final     WBC   Date Value Ref Range Status   05/18/2021 7.1 4.0 - 11.0 10e9/L Final     WBC Count   Date Value Ref Range Status   08/24/2021 7.1 4.0 - 11.0 10e3/uL Final     % Lymphocytes   Date Value Ref Range Status   08/24/2021 35 % Final   05/18/2021 42.7 % Final     Absolute CD3   Date Value Ref Range Status   05/18/2021 2,483 603 - 2,990 cells/uL Final     Absolute CD3, Total T Cells   Date Value Ref Range Status   08/24/2021 2,039 603-2,990 cells/uL Final     Absolute CD8   Date Value Ref Range Status   05/18/2021 1,267 125 - 1,312 cells/uL Final     Absolute CD8, [place] T Cells   Date Value Ref Range Status   08/24/2021 1,060 125-1,312 cells/uL Final       HIV-1 RNA Quantitative:  HIV-1 RNA Quant Result   Date Value Ref Range Status   05/18/2021 21 (A) HIVND^HIV-1 RNA Not Detected [Copies]/mL Final     Comment:     The ANAT AmpliPrep/ANAT TaqMan HIV-1 test is an FDA-approved in vitro    nucleic acid amplification test for the quantitation of HIV-1 RNA in human   plasma (EDTA plasma) using the ANAT AmpliPrep instrument for automated viral   nucleic acid extraction and the ANAT TaqMan Analyzer or ANAT TaqMan for   automated Real Time PCR amplification and detection of the viral nucleic acid   target.  Titer results are reported in copies/ml. This assay is intended for use in   conjunction with clinical presentation and other laboratory markers of disease   prognosis and for use as an aid in assessing viral response to antiretroviral   treatment as measured by changes in plasma HIV-1 RNA levels. This test should   not be used as a donor screening test to confirm the presence of HIV-1   infection.       HIV-1 RNA copies/mL   Date Value Ref Range Status   08/24/2021 <20 (A) Not Detected Copies/mL Final     HIV RNA QT Log   Date Value Ref Range Status   05/18/2021 1.3 (H) <1.3 [Log_copies]/mL Final     HIV-1 log   Date Value Ref Range Status   08/24/2021 <1.3  Final       Lab Results   Component Value Date    WBC 7.1 08/24/2021    WBC 7.1 05/18/2021     Lab Results   Component Value Date    RBC 5.26 08/24/2021    RBC 5.14 05/18/2021     Lab Results   Component Value Date    HGB 17.1 08/24/2021    HGB 16.4 05/18/2021     Lab Results   Component Value Date    HCT 50.0 08/24/2021    HCT 48.3 05/18/2021     No components found for: MCT  Lab Results   Component Value Date    MCV 95 08/24/2021    MCV 94 05/18/2021     Lab Results   Component Value Date    MCH 32.5 08/24/2021    MCH 31.9 05/18/2021     Lab Results   Component Value Date    MCHC 34.2 08/24/2021    MCHC 34.0 05/18/2021     Lab Results   Component Value Date    RDW 12.4 08/24/2021    RDW 12.8 05/18/2021     Lab Results   Component Value Date     08/24/2021     05/18/2021       Last Comprehensive Metabolic Panel:  Sodium   Date Value Ref Range Status   09/23/2021 130 (L) 133 - 144 mmol/L Final   05/18/2021 133 133 - 144 mmol/L  Final     Potassium   Date Value Ref Range Status   09/23/2021 4.2 3.4 - 5.3 mmol/L Final   05/18/2021 4.4 3.4 - 5.3 mmol/L Final     Chloride   Date Value Ref Range Status   09/23/2021 98 94 - 109 mmol/L Final   05/18/2021 100 94 - 109 mmol/L Final     Carbon Dioxide   Date Value Ref Range Status   05/18/2021 27 20 - 32 mmol/L Final     Carbon Dioxide (CO2)   Date Value Ref Range Status   09/23/2021 24 20 - 32 mmol/L Final     Anion Gap   Date Value Ref Range Status   09/23/2021 8 3 - 14 mmol/L Final   05/18/2021 6 3 - 14 mmol/L Final     Glucose   Date Value Ref Range Status   09/23/2021 88 70 - 99 mg/dL Final   05/18/2021 89 70 - 99 mg/dL Final     Urea Nitrogen   Date Value Ref Range Status   09/23/2021 9 7 - 30 mg/dL Final   05/18/2021 12 7 - 30 mg/dL Final     Creatinine   Date Value Ref Range Status   09/23/2021 0.95 0.66 - 1.25 mg/dL Final   05/18/2021 0.88 0.66 - 1.25 mg/dL Final     GFR Estimate   Date Value Ref Range Status   09/23/2021 80 >60 mL/min/1.73m2 Final     Comment:     As of July 11, 2021, eGFR is calculated by the CKD-EPI creatinine equation, without race adjustment. eGFR can be influenced by muscle mass, exercise, and diet. The reported eGFR is an estimation only and is only applicable if the renal function is stable.   05/18/2021 86 >60 mL/min/[1.73_m2] Final     Comment:     Non  GFR Calc  Starting 12/18/2018, serum creatinine based estimated GFR (eGFR) will be   calculated using the Chronic Kidney Disease Epidemiology Collaboration   (CKD-EPI) equation.       Calcium   Date Value Ref Range Status   09/23/2021 8.6 8.5 - 10.1 mg/dL Final   05/18/2021 9.3 8.5 - 10.1 mg/dL Final     Bilirubin Total   Date Value Ref Range Status   08/24/2021 0.7 0.2 - 1.3 mg/dL Final   05/18/2021 0.6 0.2 - 1.3 mg/dL Final     Alkaline Phosphatase   Date Value Ref Range Status   08/24/2021 83 40 - 150 U/L Final   05/18/2021 88 40 - 150 U/L Final     ALT   Date Value Ref Range Status   08/24/2021  24 0 - 70 U/L Final   05/18/2021 20 0 - 70 U/L Final     AST   Date Value Ref Range Status   08/24/2021 26 0 - 45 U/L Final   05/18/2021 21 0 - 45 U/L Final       Assessment and Plan:  Zac Rosenthal is a 70 year old male who is here for followup on HIV treatment.    #HIV: Well controlled on Stribild. No missed doses. Will repeat HIV labs in 6 months before follow up. Had some blips in viral load in 2/2021 to 188, since then undetectable in May 2021 and 8/2021    #HTN: The patient will follow up with his PCP to address his elevated BP. Continue lisinopril. 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 Subsequent follow-up again in Feb 2019 showed no dysplasia.  At follow up with CRS in 9/2021:  No evidence of high grade dysplasia. Plan for repeat high resolution anoscopy in 6 months.     #Colonic tubular adenoma polyp in April 2017.  Colonoscopy recommended after 5 years from then. Repeat colonoscopy planned in 2022.    #Low sodium.  On review, remains a bit low and stable since at least 2018, he likely just has a chronically low level, would just continue to watch. Diarrhea now improved.       Preventative Medicine  Cardiovascular Fidel:                        Lipids: normal in 9/2021                       Blood Pressure: 156/80, as above  Cancer Screening:                       Colon: planned scope in 2022                       Anal: Planned repeat eval in 6 months  Immunizations:                       Hepatitis A: 1/21/2000, 6/25/1999                       Hepatitis B: 7/13/2012, 1/21/2000, 7/28/1999, 6/25/1999                        Influenza: Done 9/2021                       Pneumovax/Prevnar PPSV 23 11/20/2014, 2/6/2009, 1/21/2000                       PCV 13 8/18/2017, 7/24/2013                       Tdap: 2008, 8/2017                       Td 1998                        Meningococcus: 9/21/2015, 7/23/2015                       Covid: 3 doses done  Bone Health: No screening indicated at this time  Renal Health: Cr: normal  Sexually Transmitted Infection Risk and Screening:                       Gonorrhea and Chlamydia: declined today                       Syphilis: declined today                Carlos Emerson   of Medicine  Division of Infectious Diseases

## 2022-01-03 DIAGNOSIS — Z21 HIV (HUMAN IMMUNODEFICIENCY VIRUS INFECTION) (H): ICD-10-CM

## 2022-01-03 RX ORDER — ELVITEGRAVIR, COBICISTAT, EMTRICITABINE, AND TENOFOVIR DISOPROXIL FUMARATE 150; 150; 200; 300 MG/1; MG/1; MG/1; MG/1
1 TABLET, FILM COATED ORAL
Qty: 30 TABLET | Refills: 1 | Status: SHIPPED | OUTPATIENT
Start: 2022-01-03 | End: 2022-03-03

## 2022-01-05 ENCOUNTER — TELEPHONE (OUTPATIENT)
Dept: INFECTIOUS DISEASES | Facility: CLINIC | Age: 72
End: 2022-01-05
Payer: COMMERCIAL

## 2022-01-05 NOTE — TELEPHONE ENCOUNTER
[facility] Call Center    Phone Message    May a detailed message be left on voicemail: yes     Reason for Call: Medication Question or concern regarding medication   Prescription Clarification  Name of Medication: STRIBILD [phone] mg tab  Prescribing Provider: Idania   Pharmacy: [facility][id number] [address]   What on the order needs clarification?     Pt called in questioning why there is only one refill for prescription. Stated that previous provider would give at least 6 refills. Requesting a call back at #[phone] to discuss why he only has one refill and if he is able to get more.           Action Taken: Other: ID    Travel Screening: Not Applicable

## 2022-01-05 NOTE — TELEPHONE ENCOUNTER
Called patient- rx # of refills was oversight. Offered to send to get through April appt, pt states he is fine with current rx, just was confused. Patient scheduled for April with labs prior (lab appt note yet scheduled.) Pt verbalized understanding.

## 2022-03-03 ENCOUNTER — TELEPHONE (OUTPATIENT)
Dept: INFECTIOUS DISEASES | Facility: CLINIC | Age: 72
End: 2022-03-03
Payer: COMMERCIAL

## 2022-03-03 DIAGNOSIS — Z21 HIV (HUMAN IMMUNODEFICIENCY VIRUS INFECTION) (H): ICD-10-CM

## 2022-03-03 RX ORDER — ELVITEGRAVIR, COBICISTAT, EMTRICITABINE, AND TENOFOVIR DISOPROXIL FUMARATE 150; 150; 200; 300 MG/1; MG/1; MG/1; MG/1
1 TABLET, FILM COATED ORAL
Qty: 30 TABLET | Refills: 1 | Status: SHIPPED | OUTPATIENT
Start: 2022-03-03 | End: 2022-04-27

## 2022-03-03 NOTE — TELEPHONE ENCOUNTER
[facility] Call Center    Phone Message    May a detailed message be left on voicemail: yes     Reason for Call: Medication Refill Request    Has the patient contacted the pharmacy for the refill? Yes     Name of medication being requested:   * STRIBILD [phone] mg tab    Provider who prescribed the medication: Idania    Pharmacy: [facility] #[id number] [facility] [address] [place] [address]    Date medication is needed: 3/3/2022     Patient is needing ASAP with refills patient states. Patient states he is running low on the medication    Action Taken: Message routed to:  [facility] ([facility]): ID    Travel Screening: Not Applicable

## 2022-03-20 ENCOUNTER — HEALTH MAINTENANCE LETTER (OUTPATIENT)
Age: 72
End: 2022-03-20

## 2022-03-23 ENCOUNTER — TELEPHONE (OUTPATIENT)
Dept: SURGERY | Facility: CLINIC | Age: 72
End: 2022-03-23
Payer: COMMERCIAL

## 2022-03-23 NOTE — TELEPHONE ENCOUNTER
LVM with pod numbers, sent mychart.  Schedule:  With: Rosangela Pritchett NP   Referring Provider: self   For / Appt Notes: micro   Appt Type: SULLYP Mirco   Appt Date/Time: next available

## 2022-04-12 ENCOUNTER — LAB (OUTPATIENT)
Dept: LAB | Facility: CLINIC | Age: 72
End: 2022-04-12
Payer: COMMERCIAL

## 2022-04-12 DIAGNOSIS — Z21 HUMAN IMMUNODEFICIENCY VIRUS I INFECTION (H): ICD-10-CM

## 2022-04-12 LAB
BASOPHILS # BLD AUTO: 0 10E3/UL (ref 0–0.2)
BASOPHILS NFR BLD AUTO: 0 %
EOSINOPHIL # BLD AUTO: 0.1 10E3/UL (ref 0–0.7)
EOSINOPHIL NFR BLD AUTO: 2 %
ERYTHROCYTE [DISTWIDTH] IN BLOOD BY AUTOMATED COUNT: 12.5 % (ref 10–15)
HCT VFR BLD AUTO: 46.5 % (ref 40–53)
HGB BLD-MCNC: 16.2 G/DL (ref 13.3–17.7)
LYMPHOCYTES # BLD AUTO: 2.8 10E3/UL (ref 0.8–5.3)
LYMPHOCYTES NFR BLD AUTO: 39 %
MCH RBC QN AUTO: 32.3 PG (ref 26.5–33)
MCHC RBC AUTO-ENTMCNC: 34.8 G/DL (ref 31.5–36.5)
MCV RBC AUTO: 93 FL (ref 78–100)
MONOCYTES # BLD AUTO: 0.7 10E3/UL (ref 0–1.3)
MONOCYTES NFR BLD AUTO: 10 %
NEUTROPHILS # BLD AUTO: 3.6 10E3/UL (ref 1.6–8.3)
NEUTROPHILS NFR BLD AUTO: 49 %
PLATELET # BLD AUTO: 262 10E3/UL (ref 150–450)
RBC # BLD AUTO: 5.02 10E6/UL (ref 4.4–5.9)
WBC # BLD AUTO: 7.3 10E3/UL (ref 4–11)

## 2022-04-12 PROCEDURE — 86360 T CELL ABSOLUTE COUNT/RATIO: CPT

## 2022-04-12 PROCEDURE — 87536 HIV-1 QUANT&REVRSE TRNSCRPJ: CPT

## 2022-04-12 PROCEDURE — 36415 COLL VENOUS BLD VENIPUNCTURE: CPT

## 2022-04-12 PROCEDURE — 86359 T CELLS TOTAL COUNT: CPT

## 2022-04-12 PROCEDURE — 80053 COMPREHEN METABOLIC PANEL: CPT

## 2022-04-12 PROCEDURE — 85025 COMPLETE CBC W/AUTO DIFF WBC: CPT

## 2022-04-13 LAB
ALBUMIN SERPL-MCNC: 4 G/DL (ref 3.4–5)
ALP SERPL-CCNC: 77 U/L (ref 40–150)
ALT SERPL W P-5'-P-CCNC: 23 U/L (ref 0–70)
ANION GAP SERPL CALCULATED.3IONS-SCNC: 7 MMOL/L (ref 3–14)
AST SERPL W P-5'-P-CCNC: 27 U/L (ref 0–45)
BILIRUB SERPL-MCNC: 1.1 MG/DL (ref 0.2–1.3)
BUN SERPL-MCNC: 10 MG/DL (ref 7–30)
CALCIUM SERPL-MCNC: 9.1 MG/DL (ref 8.5–10.1)
CD3 CELLS # BLD: 2683 CELLS/UL (ref 603–2990)
CD3 CELLS NFR BLD: 79 % (ref 49–84)
CD3+CD4+ CELLS # BLD: 1243 CELLS/UL (ref 441–2156)
CD3+CD4+ CELLS NFR BLD: 37 % (ref 28–63)
CD3+CD4+ CELLS/CD3+CD8+ CLL BLD: 0.88 % (ref 1.4–2.6)
CD3+CD8+ CELLS # BLD: 1413 CELLS/UL (ref 125–1312)
CD3+CD8+ CELLS NFR BLD: 42 % (ref 10–40)
CHLORIDE BLD-SCNC: 98 MMOL/L (ref 94–109)
CO2 SERPL-SCNC: 27 MMOL/L (ref 20–32)
CREAT SERPL-MCNC: 0.88 MG/DL (ref 0.66–1.25)
GFR SERPL CREATININE-BSD FRML MDRD: >90 ML/MIN/1.73M2
GLUCOSE BLD-MCNC: 87 MG/DL (ref 70–99)
POTASSIUM BLD-SCNC: 4.4 MMOL/L (ref 3.4–5.3)
PROT SERPL-MCNC: 6.9 G/DL (ref 6.8–8.8)
SODIUM SERPL-SCNC: 132 MMOL/L (ref 133–144)
T CELL COMMENT: ABNORMAL

## 2022-04-16 LAB
HIV1 RNA # PLAS NAA DL=20: 38 COPIES/ML
HIV1 RNA SERPL NAA+PROBE-LOG#: 1.6 {LOG_COPIES}/ML

## 2022-04-27 ENCOUNTER — OFFICE VISIT (OUTPATIENT)
Dept: INFECTIOUS DISEASES | Facility: CLINIC | Age: 72
End: 2022-04-27
Attending: STUDENT IN AN ORGANIZED HEALTH CARE EDUCATION/TRAINING PROGRAM
Payer: COMMERCIAL

## 2022-04-27 VITALS
TEMPERATURE: 99.1 F | OXYGEN SATURATION: 98 % | WEIGHT: 234.3 LBS | DIASTOLIC BLOOD PRESSURE: 84 MMHG | BODY MASS INDEX: 33.62 KG/M2 | HEART RATE: 82 BPM | SYSTOLIC BLOOD PRESSURE: 140 MMHG

## 2022-04-27 DIAGNOSIS — Z21 HUMAN IMMUNODEFICIENCY VIRUS I INFECTION (H): Primary | ICD-10-CM

## 2022-04-27 PROCEDURE — 99214 OFFICE O/P EST MOD 30 MIN: CPT | Mod: GC | Performed by: STUDENT IN AN ORGANIZED HEALTH CARE EDUCATION/TRAINING PROGRAM

## 2022-04-27 RX ORDER — ELVITEGRAVIR, COBICISTAT, EMTRICITABINE, AND TENOFOVIR DISOPROXIL FUMARATE 150; 150; 200; 300 MG/1; MG/1; MG/1; MG/1
1 TABLET, FILM COATED ORAL
Qty: 30 TABLET | Refills: 11 | Status: SHIPPED | OUTPATIENT
Start: 2022-04-27 | End: 2023-04-05

## 2022-04-27 ASSESSMENT — PAIN SCALES - GENERAL: PAINLEVEL: NO PAIN (0)

## 2022-04-27 NOTE — LETTER
4/27/2022       RE: Zac Rosenthal  [address]  [facility] [address]     Dear Colleague,    Thank you for referring your patient, Zac Rosenthal, to the [facility] INFECTI[facility] [place] at [facility]. Please see a copy of my visit note below.    nfectious Disease Clinic Follow Up Visit    HPI:  Zac Rosenthal is a 70 year old male who is here for follow-up on HIV treatment.  He was previously followed by Dr Victor Hugo Dacosta, now being followed by Dr. Emerson since 10/2021.     Had a high resolution anoscopy in May 2018, August 2018, and Feb 2019.  The cytology from Feb 2019 showed no abnormality.  Also had a colonoscopy which showed a tubular adenoma in April 2017.Colonoscopy recommended after 5 years from then. Repeat colonoscopy planned in 2022. Follows with CRS, in Sep 2021 no evidence of high grade dysplasia. Plan for repeat high resolution anoscopy in in May 2022.     He has an established primary care with family medicine, Dr. Francis Cordon, who has him on lisinpril for hypertension.     In clinic today, he tells me he has a cold. His primary symptom is feeling tired. Symptoms started last night. He denies sore throat, congestion, headaches, fevers or diarrhea.     Regarding HIV medications, he is taking Stribild as prescribed. No missed doses. No side effects. On review of other medications, he is not taking fluconazole or ketoconazole right now -- using Calmoseptine ointment recommended by CRS. Would still like the antifungals on his chart in case he needs it.     Regarding his history of abnormal anal pap, he is following with colorectal surgery, next appointment in a 3 weeks.     ROS:   ROS negative except as described above.    Social History:  No cigarette smoking. No heavy alcohol use. No recreational or injection drug use. No recent sexual activity.     Medications:  Current Outpatient Medications   Medication Sig  Dispense Refill     fluconazole (DIFLUCAN) 200 MG tablet Take 1 tablet by mouth daily. 90 tablet 3     ketoconazole (NIZORAL) 2 % external cream Apply topically daily 15 g 11     lisinopril (ZESTRIL) 40 MG tablet Take 1 tablet (40 mg) by mouth daily 90 tablet 3     STRIBILD [id number] mg tab Take 1 tablet by mouth daily with food 30 tablet 1       Exam:  BP (!) 140/84   Pulse 82   Temp 99.1  F (37.3  C) (Oral)   SpO2 98%     Physical Exam   Constitutional: He is well-developed, well-nourished, and in no distress. No difficulty breathing or speaking.  No cough  Neurological: He is alert.   Psychiatric: Affect and judgment normal.   HEENT: No oral/esophageal thrush  RS: CTAB  CVS: S1 S2 normal, RRR, No RMG  Skin: No obvious rashes  Lymph nodes: No cervical or axillary lymphadenopathy    T Cell Subset:  Absolute CD4   Date Value Ref Range Status   05/18/2021 1,176 441 - 2,156 cells/uL Final     Absolute CD4, [place] T Cells   Date Value Ref Range Status   04/12/2022 1,243 441 - 2,156 cells/uL Final     CD4 [place] T   Date Value Ref Range Status   05/18/2021 37 28 - 63 % Final     CD4% [place] T Cells   Date Value Ref Range Status   04/12/2022 37 28 - 63 % Final     CD8 Suppressor T   Date Value Ref Range Status   05/18/2021 39 10 - 40 % Final     CD8% Suppressor T Cells   Date Value Ref Range Status   04/12/2022 42 (H) 10 - 40 % Final     CD3 Mature T   Date Value Ref Range Status   05/18/2021 77 49 - 84 % Final     CD3% Total T Cells   Date Value Ref Range Status   04/12/2022 79 49 - 84 % Final     CD4:CD8 Ratio   Date Value Ref Range Status   04/12/2022 0.88 (L) 1.40 - 2.60 Final   05/18/2021 0.95 (L) 1.40 - 2.60 Final     WBC   Date Value Ref Range Status   05/18/2021 7.1 4.0 - 11.0 10e9/L Final     WBC Count   Date Value Ref Range Status   04/12/2022 7.3 4.0 - 11.0 10e3/uL Final     % Lymphocytes   Date Value Ref Range Status   04/12/2022 39 % Final   05/18/2021 42.7 % Final     Absolute CD3   Date Value Ref  Range Status   05/18/2021 2,483 603 - 2,990 cells/uL Final     Absolute CD3, Total T Cells   Date Value Ref Range Status   04/12/2022 2,683 603 - 2,990 cells/uL Final     Absolute CD8   Date Value Ref Range Status   05/18/2021 1,267 125 - 1,312 cells/uL Final     Absolute CD8, Suppressor T Cells   Date Value Ref Range Status   04/12/2022 1,413 (H) 125 - 1,312 cells/uL Final       HIV-1 RNA Quantitative:  HIV-1 RNA Quant Result   Date Value Ref Range Status   05/18/2021 21 (A) HIVND^HIV-1 RNA Not Detected [Copies]/mL Final     Comment:     The ANAT AmpliPrep/ANAT TaqMan HIV-1 test is an FDA-approved in vitro   nucleic acid amplification test for the quantitation of HIV-1 RNA in human   plasma (EDTA plasma) using the ANAT AmpliPrep instrument for automated viral   nucleic acid extraction and the ANAT TaqMan Analyzer or [employer] TaqMan for   automated Real Time PCR amplification and detection of the viral nucleic acid   target.  Titer results are reported in copies/ml. This assay is intended for use in   conjunction with clinical presentation and other laboratory markers of disease   prognosis and for use as an aid in assessing viral response to antiretroviral   treatment as measured by changes in plasma HIV-1 RNA levels. This test should   not be used as a donor screening test to confirm the presence of HIV-1   infection.       HIV-1 RNA copies/mL   Date Value Ref Range Status   08/24/2021 <20 (A) Not Detected Copies/mL Final     HIV-1 RNA Copies/mL, Instrument   Date Value Ref Range Status   04/12/2022 38 (H) <1 copies/mL Final     HIV RNA QT Log   Date Value Ref Range Status   05/18/2021 1.3 (H) <1.3 [Log_copies]/mL Final     HIV-1 log   Date Value Ref Range Status   04/12/2022 1.6  Final       Lab Results   Component Value Date    WBC 7.1 08/24/2021    WBC 7.1 05/18/2021     Lab Results   Component Value Date    RBC 5.26 08/24/2021    RBC 5.14 05/18/2021     Lab Results   Component Value Date    HGB 17.1  08/24/2021    HGB 16.4 05/18/2021     Lab Results   Component Value Date    HCT 50.0 08/24/2021    HCT 48.3 05/18/2021     No components found for: MCT  Lab Results   Component Value Date    MCV 95 08/24/2021    MCV 94 05/18/2021     Lab Results   Component Value Date    MCH 32.5 08/24/2021    MCH 31.9 05/18/2021     Lab Results   Component Value Date    MCHC 34.2 08/24/2021    MCHC 34.0 05/18/2021     Lab Results   Component Value Date    RDW 12.4 08/24/2021    RDW 12.8 05/18/2021     Lab Results   Component Value Date     08/24/2021     05/18/2021       Last Comprehensive Metabolic Panel:  Sodium   Date Value Ref Range Status   04/12/2022 132 (L) 133 - 144 mmol/L Final   05/18/2021 133 133 - 144 mmol/L Final     Potassium   Date Value Ref Range Status   04/12/2022 4.4 3.4 - 5.3 mmol/L Final   05/18/2021 4.4 3.4 - 5.3 mmol/L Final     Chloride   Date Value Ref Range Status   04/12/2022 98 94 - 109 mmol/L Final   05/18/2021 100 94 - 109 mmol/L Final     Carbon Dioxide   Date Value Ref Range Status   05/18/2021 27 20 - 32 mmol/L Final     Carbon Dioxide (CO2)   Date Value Ref Range Status   04/12/2022 27 20 - 32 mmol/L Final     Anion Gap   Date Value Ref Range Status   04/12/2022 7 3 - 14 mmol/L Final   05/18/2021 6 3 - 14 mmol/L Final     Glucose   Date Value Ref Range Status   04/12/2022 87 70 - 99 mg/dL Final   05/18/2021 89 70 - 99 mg/dL Final     Urea Nitrogen   Date Value Ref Range Status   04/12/2022 10 7 - 30 mg/dL Final   05/18/2021 12 7 - 30 mg/dL Final     Creatinine   Date Value Ref Range Status   04/12/2022 0.88 0.66 - 1.25 mg/dL Final   05/18/2021 0.88 0.66 - 1.25 mg/dL Final     GFR Estimate   Date Value Ref Range Status   04/12/2022 >90 >60 mL/min/1.73m2 Final     Comment:     Effective December 21, 2021 eGFRcr in adults is calculated using the 2021 CKD-EPI creatinine equation which includes age and gender (Fernando et al., NEJM, DOI: 10.1056/DIFVtp7279272)   05/18/2021 86 >60  mL/min/[1.73_m2] Final     Comment:     Non  GFR Calc  Starting 12/18/2018, serum creatinine based estimated GFR (eGFR) will be   calculated using the Chronic Kidney Disease Epidemiology Collaboration   (CKD-EPI) equation.       Calcium   Date Value Ref Range Status   04/12/2022 9.1 8.5 - 10.1 mg/dL Final   05/18/2021 9.3 8.5 - 10.1 mg/dL Final     Bilirubin Total   Date Value Ref Range Status   04/12/2022 1.1 0.2 - 1.3 mg/dL Final   05/18/2021 0.6 0.2 - 1.3 mg/dL Final     Alkaline Phosphatase   Date Value Ref Range Status   04/12/2022 77 40 - 150 U/L Final   05/18/2021 88 40 - 150 U/L Final     ALT   Date Value Ref Range Status   04/12/2022 23 0 - 70 U/L Final   05/18/2021 20 0 - 70 U/L Final     AST   Date Value Ref Range Status   04/12/2022 27 0 - 45 U/L Final   05/18/2021 21 0 - 45 U/L Final       Assessment and Plan:  Zac Rosenthal is a 70 year old male who is here for followup on HIV treatment.    #HIV: Well controlled on Stribild. CD4 1413, VL 38 in 4/2022. No missed doses. Had some blips in viral load in 2/2021 to 188. No viral load >200 in chart (records in 2015 reviewed)    #HTN: Continue lisinopril. Per patient, home systolic BP 130s, and PCP told him he did not need a second BP agent. Last seen in fall, advised to check in with primary again to re-evaluate.     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 Subsequent follow-up again in Feb 2019 showed no dysplasia.  At follow up with CRS in 9/2021: No evidence of high grade dysplasia. Plan for repeat high resolution anoscopy 5/2022    #Colonic tubular adenoma polyp in April 2017.  Colonoscopy recommended after 5 years from then. Repeat colonoscopy planned in 2022.    #Low sodium.  Stable. Asymptomatic.    #URI Reports cold, no fevers. Advised to call if worsening fevers or upper respiratory symptoms      Preventative Medicine  Cardiovascular Fidel:                         Lipids: normal in 9/2021                       Blood Pressure: 140/84, recommended goal <130/80  Cancer Screening:                       Colon: planned scope in 2022                       Anal: Follows with CRS -- anoscopy scheduled for 5/2022  Immunizations:                       Hepatitis A: 1/21/2000, 6/25/1999                       Hepatitis B: 7/13/2012, 1/21/2000, 7/28/1999, 6/25/1999                        Influenza: Done 9/2021                       Pneumovax/Prevnar PPSV 23 11/20/2014, 2/6/2009, 1/21/2000                       PCV 13 8/18/2017, 7/24/2013                       Tdap: 2008, 8/2017                       Td 1998                       Meningococcus: 9/21/2015, 7/23/2015 -- due for 5yr booster - wearing long sleeved shirt today, pt prefers to think about and get it at [facility] or elsewhere                       Covid: 3 doses done  Bone Health: No screening indicated at this time  Renal Health: Cr: normal  Sexually Transmitted Infection Risk and Screening:                       Gonorrhea and Chlamydia: declined today                       Syphilis: declined today    Patient seen and examined with attending, Dr. Emerson, who agrees with this assessment and plan.     Aixa Juan MD  Internal Medicine, PGY-3    Recommendations  Meningococcal vaccine  Talk to PCP about adjusting BP meds  Continue Stribild, have refilled for 11 months (please note for future refills that pt prefers to have multiple refills available)  Follow up with Anoscopy and Colonosocpy  Advised to call if worsening fevers or upper respiratory symptoms  Follow up with me in 6 months with labs one week prior      Attending attestation  I personally examined and evaluated this patient on 4/27/22. I discussed the patient with the resident and agree with the assessment and plan of care as documented in the resident's note of 4/27/22 and have edited protions of the note. I personally reviewed vital signs, medications,  labs, imaging.     Carlos Emerson  Infectious Disease Staff Physician        Attestation signed by Carlos Emerson MD at 4/27/2022  5:44 PM:  Attested as above

## 2022-04-27 NOTE — PROGRESS NOTES
nfectious Disease Clinic Follow Up Visit    HPI:  Zac Rosenthal is a 70 year old male who is here for follow-up on HIV treatment.  He was previously followed by Dr Victor Hugo Dacosta, now being followed by Dr. Emerson since 10/2021.     Had a high resolution anoscopy in May 2018, August 2018, and Feb 2019.  The cytology from Feb 2019 showed no abnormality.  Also had a colonoscopy which showed a tubular adenoma in April 2017.Colonoscopy recommended after 5 years from then. Repeat colonoscopy planned in 2022. Follows with CRS, in Sep 2021 no evidence of high grade dysplasia. Plan for repeat high resolution anoscopy in in May 2022.     He has an established primary care with family medicine, Dr. Francis Cordon, who has him on lisinpril for hypertension.     In clinic today, he tells me he has a cold. His primary symptom is feeling tired. Symptoms started last night. He denies sore throat, congestion, headaches, fevers or diarrhea.     Regarding HIV medications, he is taking Stribild as prescribed. No missed doses. No side effects. On review of other medications, he is not taking fluconazole or ketoconazole right now -- using Calmoseptine ointment recommended by CRS. Would still like the antifungals on his chart in case he needs it.     Regarding his history of abnormal anal pap, he is following with colorectal surgery, next appointment in a 3 weeks.     ROS:   ROS negative except as described above.    Social History:  No cigarette smoking. No heavy alcohol use. No recreational or injection drug use. No recent sexual activity.     Medications:  Current Outpatient Medications   Medication Sig Dispense Refill     fluconazole (DIFLUCAN) 200 MG tablet Take 1 tablet by mouth daily. 90 tablet 3     ketoconazole (NIZORAL) 2 % external cream Apply topically daily 15 g 11     lisinopril (ZESTRIL) 40 MG tablet Take 1 tablet (40 mg) by mouth daily 90 tablet 3     STRIBILD [id number] mg tab Take 1 tablet by mouth daily with food  30 tablet 1       Exam:  BP (!) 140/84   Pulse 82   Temp 99.1  F (37.3  C) (Oral)   SpO2 98%     Physical Exam   Constitutional: He is well-developed, well-nourished, and in no distress. No difficulty breathing or speaking.  No cough  Neurological: He is alert.   Psychiatric: Affect and judgment normal.   HEENT: No oral/esophageal thrush  RS: CTAB  CVS: S1 S2 normal, RRR, No RMG  Skin: No obvious rashes  Lymph nodes: No cervical or axillary lymphadenopathy    T Cell Subset:  Absolute CD4   Date Value Ref Range Status   05/18/2021 1,176 441 - 2,156 cells/uL Final     Absolute CD4, [place] T Cells   Date Value Ref Range Status   04/12/2022 1,243 441 - 2,156 cells/uL Final     CD4 [place] T   Date Value Ref Range Status   05/18/2021 37 28 - 63 % Final     CD4% [place] T Cells   Date Value Ref Range Status   04/12/2022 37 28 - 63 % Final     CD8 Suppressor T   Date Value Ref Range Status   05/18/2021 39 10 - 40 % Final     CD8% Suppressor T Cells   Date Value Ref Range Status   04/12/2022 42 (H) 10 - 40 % Final     CD3 Mature T   Date Value Ref Range Status   05/18/2021 77 49 - 84 % Final     CD3% Total T Cells   Date Value Ref Range Status   04/12/2022 79 49 - 84 % Final     CD4:CD8 Ratio   Date Value Ref Range Status   04/12/2022 0.88 (L) 1.40 - 2.60 Final   05/18/2021 0.95 (L) 1.40 - 2.60 Final     WBC   Date Value Ref Range Status   05/18/2021 7.1 4.0 - 11.0 10e9/L Final     WBC Count   Date Value Ref Range Status   04/12/2022 7.3 4.0 - 11.0 10e3/uL Final     % Lymphocytes   Date Value Ref Range Status   04/12/2022 39 % Final   05/18/2021 42.7 % Final     Absolute CD3   Date Value Ref Range Status   05/18/2021 2,483 603 - 2,990 cells/uL Final     Absolute CD3, Total T Cells   Date Value Ref Range Status   04/12/2022 2,683 603 - 2,990 cells/uL Final     Absolute CD8   Date Value Ref Range Status   05/18/2021 1,267 125 - 1,312 cells/uL Final     Absolute CD8, Suppressor T Cells   Date Value Ref Range Status    04/12/2022 1,413 (H) 125 - 1,312 cells/uL Final       HIV-1 RNA Quantitative:  HIV-1 RNA Quant Result   Date Value Ref Range Status   05/18/2021 21 (A) HIVND^HIV-1 RNA Not Detected [Copies]/mL Final     Comment:     The [employer] AmpliPrep/ANAT TaqMan HIV-1 test is an FDA-approved in vitro   nucleic acid amplification test for the quantitation of HIV-1 RNA in human   plasma (EDTA plasma) using the ANAT AmpliPrep instrument for automated viral   nucleic acid extraction and the ANAT TaqMan Analyzer or [employer] TaqMan for   automated Real Time PCR amplification and detection of the viral nucleic acid   target.  Titer results are reported in copies/ml. This assay is intended for use in   conjunction with clinical presentation and other laboratory markers of disease   prognosis and for use as an aid in assessing viral response to antiretroviral   treatment as measured by changes in plasma HIV-1 RNA levels. This test should   not be used as a donor screening test to confirm the presence of HIV-1   infection.       HIV-1 RNA copies/mL   Date Value Ref Range Status   08/24/2021 <20 (A) Not Detected Copies/mL Final     HIV-1 RNA Copies/mL, Instrument   Date Value Ref Range Status   04/12/2022 38 (H) <1 copies/mL Final     HIV RNA QT Log   Date Value Ref Range Status   05/18/2021 1.3 (H) <1.3 [Log_copies]/mL Final     HIV-1 log   Date Value Ref Range Status   04/12/2022 1.6  Final       Lab Results   Component Value Date    WBC 7.1 08/24/2021    WBC 7.1 05/18/2021     Lab Results   Component Value Date    RBC 5.26 08/24/2021    RBC 5.14 05/18/2021     Lab Results   Component Value Date    HGB 17.1 08/24/2021    HGB 16.4 05/18/2021     Lab Results   Component Value Date    HCT 50.0 08/24/2021    HCT 48.3 05/18/2021     No components found for: MCT  Lab Results   Component Value Date    MCV 95 08/24/2021    MCV 94 05/18/2021     Lab Results   Component Value Date    MCH 32.5 08/24/2021    MCH 31.9 05/18/2021     Lab Results    Component Value Date    MCHC 34.2 08/24/2021    MCHC 34.0 05/18/2021     Lab Results   Component Value Date    RDW 12.4 08/24/2021    RDW 12.8 05/18/2021     Lab Results   Component Value Date     08/24/2021     05/18/2021       Last Comprehensive Metabolic Panel:  Sodium   Date Value Ref Range Status   04/12/2022 132 (L) 133 - 144 mmol/L Final   05/18/2021 133 133 - 144 mmol/L Final     Potassium   Date Value Ref Range Status   04/12/2022 4.4 3.4 - 5.3 mmol/L Final   05/18/2021 4.4 3.4 - 5.3 mmol/L Final     Chloride   Date Value Ref Range Status   04/12/2022 98 94 - 109 mmol/L Final   05/18/2021 100 94 - 109 mmol/L Final     Carbon Dioxide   Date Value Ref Range Status   05/18/2021 27 20 - 32 mmol/L Final     Carbon Dioxide (CO2)   Date Value Ref Range Status   04/12/2022 27 20 - 32 mmol/L Final     Anion Gap   Date Value Ref Range Status   04/12/2022 7 3 - 14 mmol/L Final   05/18/2021 6 3 - 14 mmol/L Final     Glucose   Date Value Ref Range Status   04/12/2022 87 70 - 99 mg/dL Final   05/18/2021 89 70 - 99 mg/dL Final     Urea Nitrogen   Date Value Ref Range Status   04/12/2022 10 7 - 30 mg/dL Final   05/18/2021 12 7 - 30 mg/dL Final     Creatinine   Date Value Ref Range Status   04/12/2022 0.88 0.66 - 1.25 mg/dL Final   05/18/2021 0.88 0.66 - 1.25 mg/dL Final     GFR Estimate   Date Value Ref Range Status   04/12/2022 >90 >60 mL/min/1.73m2 Final     Comment:     Effective December 21, 2021 eGFRcr in adults is calculated using the 2021 CKD-EPI creatinine equation which includes age and gender (Fernando avina al., NEJM, DOI: 10.1056/FDXSxb6914796)   05/18/2021 86 >60 mL/min/[1.73_m2] Final     Comment:     Non  GFR Calc  Starting 12/18/2018, serum creatinine based estimated GFR (eGFR) will be   calculated using the Chronic Kidney Disease Epidemiology Collaboration   (CKD-EPI) equation.       Calcium   Date Value Ref Range Status   04/12/2022 9.1 8.5 - 10.1 mg/dL Final   05/18/2021 9.3 8.5  - 10.1 mg/dL Final     Bilirubin Total   Date Value Ref Range Status   04/12/2022 1.1 0.2 - 1.3 mg/dL Final   05/18/2021 0.6 0.2 - 1.3 mg/dL Final     Alkaline Phosphatase   Date Value Ref Range Status   04/12/2022 77 40 - 150 U/L Final   05/18/2021 88 40 - 150 U/L Final     ALT   Date Value Ref Range Status   04/12/2022 23 0 - 70 U/L Final   05/18/2021 20 0 - 70 U/L Final     AST   Date Value Ref Range Status   04/12/2022 27 0 - 45 U/L Final   05/18/2021 21 0 - 45 U/L Final       Assessment and Plan:  Zac Rosenthal is a 70 year old male who is here for followup on HIV treatment.    #HIV: Well controlled on Stribild. CD4 1413, VL 38 in 4/2022. No missed doses. Had some blips in viral load in 2/2021 to 188. No viral load >200 in chart (records in 2015 reviewed)    #HTN: Continue lisinopril. Per patient, home systolic BP 130s, and PCP told him he did not need a second BP agent. Last seen in fall, advised to check in with primary again to re-evaluate.     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 Subsequent follow-up again in Feb 2019 showed no dysplasia.  At follow up with CRS in 9/2021: No evidence of high grade dysplasia. Plan for repeat high resolution anoscopy 5/2022    #Colonic tubular adenoma polyp in April 2017.  Colonoscopy recommended after 5 years from then. Repeat colonoscopy planned in 2022.    #Low sodium.  Stable. Asymptomatic.    #URI Reports cold, no fevers. Advised to call if worsening fevers or upper respiratory symptoms      Preventative Medicine  Cardiovascular Fidel:                        Lipids: normal in 9/2021                       Blood Pressure: 140/84, recommended goal <130/80  Cancer Screening:                       Colon: planned scope in 2022                       Anal: Follows with CRS -- anoscopy scheduled for 5/2022  Immunizations:                       Hepatitis A: 1/21/2000, 6/25/1999                        Hepatitis B: 7/13/2012, 1/21/2000, 7/28/1999, 6/25/1999                        Influenza: Done 9/2021                       Pneumovax/Prevnar PPSV 23 11/20/2014, 2/6/2009, 1/21/2000                       PCV 13 8/18/2017, 7/24/2013                       Tdap: 2008, 8/2017                       Td 1998                       Meningococcus: 9/21/2015, 7/23/2015 -- due for 5yr booster - wearing long sleeved shirt today, pt prefers to think about and get it at [facility] or elsewhere                       Covid: 3 doses done  Bone Health: No screening indicated at this time  Renal Health: Cr: normal  Sexually Transmitted Infection Risk and Screening:                       Gonorrhea and Chlamydia: declined today                       Syphilis: declined today    Patient seen and examined with attending, Dr. Emerson, who agrees with this assessment and plan.     Aixa Juan MD  Internal Medicine, PGY-3    Recommendations  Meningococcal vaccine  Talk to PCP about adjusting BP meds  Continue Stribild, have refilled for 11 months (please note for future refills that pt prefers to have multiple refills available)  Follow up with Anoscopy and Colonosocpy  Advised to call if worsening fevers or upper respiratory symptoms  Follow up with me in 6 months with labs one week prior      Attending attestation  I personally examined and evaluated this patient on 4/27/22. I discussed the patient with the resident and agree with the assessment and plan of care as documented in the resident's note of 4/27/22 and have edited protions of the note. I personally reviewed vital signs, medications, labs, imaging.     Carlos Emerson  Infectious Disease Staff Physician

## 2022-04-27 NOTE — PATIENT INSTRUCTIONS
Meningococcal vaccine  Talk to your PCP about adjusting BP meds  Continue Stribild, have refilled for 11 months  Follow up with Anoscopy and Colonosocpy  Call if worsening fevers or upper respiratory symptoms  Follow up with me in 6 months with labs one week prior

## 2022-05-19 ENCOUNTER — OFFICE VISIT (OUTPATIENT)
Dept: SURGERY | Facility: CLINIC | Age: 72
End: 2022-05-19
Payer: COMMERCIAL

## 2022-05-19 VITALS
DIASTOLIC BLOOD PRESSURE: 89 MMHG | WEIGHT: 201.4 LBS | OXYGEN SATURATION: 98 % | SYSTOLIC BLOOD PRESSURE: 128 MMHG | HEART RATE: 62 BPM | BODY MASS INDEX: 28.83 KG/M2 | HEIGHT: 70 IN

## 2022-05-19 DIAGNOSIS — Z86.0101 HX OF ADENOMATOUS COLONIC POLYPS: ICD-10-CM

## 2022-05-19 DIAGNOSIS — K62.82 ANAL DYSPLASIA: Primary | ICD-10-CM

## 2022-05-19 LAB
LAB DIRECTOR COMMENTS: ABNORMAL
LAB DIRECTOR DISCLAIMER: ABNORMAL
LAB DIRECTOR INTERPRETATION: ABNORMAL
LAB DIRECTOR METHODOLOGY: ABNORMAL
LAB DIRECTOR RESULTS: ABNORMAL
SPECIMEN DESCRIPTION: ABNORMAL

## 2022-05-19 PROCEDURE — 88305 TISSUE EXAM BY PATHOLOGIST: CPT | Mod: 26 | Performed by: PATHOLOGY

## 2022-05-19 PROCEDURE — 46910 DESTRUCTION ANAL LESION(S): CPT | Performed by: NURSE PRACTITIONER

## 2022-05-19 PROCEDURE — G0452 MOLECULAR PATHOLOGY INTERPR: HCPCS | Mod: 26 | Performed by: STUDENT IN AN ORGANIZED HEALTH CARE EDUCATION/TRAINING PROGRAM

## 2022-05-19 PROCEDURE — 88305 TISSUE EXAM BY PATHOLOGIST: CPT | Mod: TC | Performed by: NURSE PRACTITIONER

## 2022-05-19 PROCEDURE — 87624 HPV HI-RISK TYP POOLED RSLT: CPT | Performed by: NURSE PRACTITIONER

## 2022-05-19 PROCEDURE — 88112 CYTOPATH CELL ENHANCE TECH: CPT | Mod: TC | Performed by: NURSE PRACTITIONER

## 2022-05-19 PROCEDURE — 88112 CYTOPATH CELL ENHANCE TECH: CPT | Mod: 26 | Performed by: PATHOLOGY

## 2022-05-19 ASSESSMENT — PAIN SCALES - GENERAL: PAINLEVEL: NO PAIN (0)

## 2022-05-19 NOTE — LETTER
2022       RE: Zac Rosenthal  [address]  [facility] [address]     Dear Colleague,    Thank you for referring your patient, Zac Rosenthal, to the [facility] COLON AND RECTAL SURGERY CLINIC [place] at [facility]. Please see a copy of my visit note below.      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 He was  seen in our clinic for HRA on 4/15/2021 with high grade dysplasia that was fulgurated completely and negative anal cytology. His last high resolution anoscopy was on 21 with no evidence of high grade dysplasia.    HPI: Zac is not a smoker. His HIV is under good control. He has no anorectal complaints and no other concerns today.   His underwent a colonoscopy on 17 with one tubular adenoma and repeat recommended after 5 years. His brother had recurrent bladder cancer but is doing well.      ASSESSMENT: Written, informed consent was obtained prior to procedure.  Prior to the start of the procedure and with procedural staff participation, I verbally confirmed the patient s identity using two indicators, relevant allergies, that the procedure was appropriate and matched the consent or emergent situation, and that the correct equipment/implants were available. Immediately prior to starting the procedure I conducted the Time Out with the procedural staff and re-confirmed the patient s name, procedure, and site/side. (The Joint Commission universal protocol was followed.)  Yes    Sedation (Moderate or Deep): None    Anal cytology was not obtained today. Digital anal rectal exam was performed without any palpable lesions. Dilute acetic acid soak was completed for 2 minutes. Lubricant was used to insert the anoscope. Performed high resolution  microscopy using the colposcope. The dentate line was viewed in its entirety. Mild acetowhitening at the dentate line without punctation in the right anterior position. Bright acetowhitening with punctation at the dentate line in the left anterior position. After injection with 1% lidocaine with epinephrine, biopsy was obtained using a baby Tischler forceps and entire lesion was destroyed with cautery. He tolerated this well.  Perianal skin examined after acetic acid soak. No acetowhitening, punctation, or verruciform lesions.    PLAN: Follow-up with results of biopsies from today.  If low-grade dysplasia or normal, repeat high-resolution anoscopy in 6 months.  If high-grade dysplasia, this was destroyed completely today so would have him return for repeat high-resolution anoscopy in 3 to 4 months. Repeat colonoscopy this year.  Patient's questions were answered to his stated satisfaction and he is in agreement with this plan.    For details of past medical history, surgical history, family history, medications, allergies, and review of systems, please see details below.    Medical history:  No past medical history on file.    Surgical history:  No past surgical history on file.    Family history:  Family History   Problem Relation Age of Onset     Cervical Cancer Mother      Lymphoma Mother      Other Cancer Mother      Myocardial Infarction Father      Heart Disease Brother      Coronary Artery Disease Brother        Medications:  Current Outpatient Medications   Medication Sig Dispense Refill     fluconazole (DIFLUCAN) 200 MG tablet Take 1 tablet by mouth daily. 90 tablet 3     ketoconazole (NIZORAL) 2 % external cream Apply topically daily 15 g 11     lisinopril (ZESTRIL) 40 MG tablet Take 1 tablet (40 mg) by mouth daily 90 tablet 3     STRIBILD [id number] mg tab Take 1 tablet by mouth daily with food 30 tablet 11     Allergies:  The patientis allergic to amoxicillin.    Social history:  Social History  "    Tobacco Use     Smoking status: Never Smoker     Smokeless tobacco: Never Used   Substance Use Topics     Alcohol use: Yes     Alcohol/week: 0.0 standard drinks     Comment: 3x yr     Marital status: single.    Review of Systems:  Nursing Notes:   Dariela Chow  5/19/2022  1:11 PM  Signed  Chief Complaint   Patient presents with     Follow Up     HRA       Vitals:    05/19/22 1309   BP: 128/89   BP Location: Left arm   Patient Position: Sitting   Cuff Size: Adult Regular   Pulse: 62   SpO2: 98%   Weight: 201 lb 6.4 oz   Height: 5' 10\"       Body mass index is 28.9 kg/m .    Dariela Chow CMA         This procedure was performed under a collaborative agreement with Dr. Mikael Prabhakar MD, Chief of Colon and Rectal Surgery, [facility].      Rosangela Valentin NP-C  Colon and Rectal Surgery  [facility]  "

## 2022-05-19 NOTE — PROGRESS NOTES
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 He was  seen in our clinic for HRA on 4/15/2021 with high grade dysplasia that was fulgurated completely and negative anal cytology. His last high resolution anoscopy was on 21 with no evidence of high grade dysplasia.    HPI: Zac is not a smoker. His HIV is under good control. He has no anorectal complaints and no other concerns today.   His underwent a colonoscopy on 17 with one tubular adenoma and repeat recommended after 5 years. His brother had recurrent bladder cancer but is doing well.      ASSESSMENT: Written, informed consent was obtained prior to procedure.  Prior to the start of the procedure and with procedural staff participation, I verbally confirmed the patient s identity using two indicators, relevant allergies, that the procedure was appropriate and matched the consent or emergent situation, and that the correct equipment/implants were available. Immediately prior to starting the procedure I conducted the Time Out with the procedural staff and re-confirmed the patient s name, procedure, and site/side. (The Joint Commission universal protocol was followed.)  Yes    Sedation (Moderate or Deep): None    Anal cytology was not obtained today. Digital anal rectal exam was performed without any palpable lesions. Dilute acetic acid soak was completed for 2 minutes. Lubricant was used to insert the anoscope. Performed high resolution microscopy using the colposcope. The dentate line was viewed in its entirety. Mild acetowhitening at the dentate line without punctation in the right anterior position. Bright acetowhitening with punctation at the dentate line in the left anterior position. After injection with 1% lidocaine with epinephrine, biopsy was obtained using a baby  Report given to [facility]. Tischler forceps and entire lesion was destroyed with cautery. He tolerated this well.  Perianal skin examined after acetic acid soak. No acetowhitening, punctation, or verruciform lesions.    PLAN: Follow-up with results of biopsies from today.  If low-grade dysplasia or normal, repeat high-resolution anoscopy in 6 months.  If high-grade dysplasia, this was destroyed completely today so would have him return for repeat high-resolution anoscopy in 3 to 4 months. Repeat colonoscopy this year.  Patient's questions were answered to his stated satisfaction and he is in agreement with this plan.    For details of past medical history, surgical history, family history, medications, allergies, and review of systems, please see details below.    Medical history:  No past medical history on file.    Surgical history:  No past surgical history on file.    Family history:  Family History   Problem Relation Age of Onset     Cervical Cancer Mother      Lymphoma Mother      Other Cancer Mother      Myocardial Infarction Father      Heart Disease Brother      Coronary Artery Disease Brother        Medications:  Current Outpatient Medications   Medication Sig Dispense Refill     fluconazole (DIFLUCAN) 200 MG tablet Take 1 tablet by mouth daily. 90 tablet 3     ketoconazole (NIZORAL) 2 % external cream Apply topically daily 15 g 11     lisinopril (ZESTRIL) 40 MG tablet Take 1 tablet (40 mg) by mouth daily 90 tablet 3     STRIBILD [id number] mg tab Take 1 tablet by mouth daily with food 30 tablet 11     Allergies:  The patientis allergic to amoxicillin.    Social history:  Social History     Tobacco Use     Smoking status: Never Smoker     Smokeless tobacco: Never Used   Substance Use Topics     Alcohol use: Yes     Alcohol/week: 0.0 standard drinks     Comment: 3x yr     Marital status: single.    Review of Systems:  Nursing Notes:   Dariela Chow  5/19/2022  1:11 PM  Signed  Chief Complaint   Patient presents with     Follow  "Up     HRA       Vitals:    05/19/22 1309   BP: 128/89   BP Location: Left arm   Patient Position: Sitting   Cuff Size: Adult Regular   Pulse: 62   SpO2: 98%   Weight: 201 lb 6.4 oz   Height: 5' 10\"       Body mass index is 28.9 kg/m .    Dariela Chow CMA         This procedure was performed under a collaborative agreement with Dr. Mikael Prabhakar MD, Chief of Colon and Rectal Surgery, [facility].    Rosangela Valentin NP-C  Colon and Rectal Surgery  [facility]  "

## 2022-05-19 NOTE — NURSING NOTE
"Chief Complaint   Patient presents with     Follow Up     HRA       Vitals:    05/19/22 1309   BP: 128/89   BP Location: Left arm   Patient Position: Sitting   Cuff Size: Adult Regular   Pulse: 62   SpO2: 98%   Weight: 201 lb 6.4 oz   Height: 5' 10\"       Body mass index is 28.9 kg/m .    Dariela Chow CMA    "

## 2022-05-20 ENCOUNTER — TELEPHONE (OUTPATIENT)
Dept: SURGERY | Facility: CLINIC | Age: 72
End: 2022-05-20
Payer: COMMERCIAL

## 2022-05-20 LAB
PATH REPORT.COMMENTS IMP SPEC: NORMAL
PATH REPORT.FINAL DX SPEC: NORMAL
PATH REPORT.FINAL DX SPEC: NORMAL
PATH REPORT.GROSS SPEC: NORMAL
PATH REPORT.GROSS SPEC: NORMAL
PATH REPORT.MICROSCOPIC SPEC OTHER STN: NORMAL
PATH REPORT.RELEVANT HX SPEC: NORMAL
PATH REPORT.RELEVANT HX SPEC: NORMAL
PHOTO IMAGE: NORMAL

## 2022-05-20 NOTE — TELEPHONE ENCOUNTER
Zac is s/p HRA with biopsy. He is concerned about the amount of blood he is having post bowel movement. He states the blood is dripping in the toilet. I told him this is normal post biopsy but he's concerned as this is more blood than he normally sees after this procedure. Rosangela Valentin NP was able to discuss Zac's concerns with him.

## 2022-05-20 NOTE — TELEPHONE ENCOUNTER
[facility] Call Center    Phone Message    May a detailed message be left on voicemail: yes     Reason for Call: Other: Per pt after the appt yesterday with EVM, when i use the bathroom and bowel come out bleeding occurs too. per pt wants to know what to do at this time. Please call pt back ASAP!     Action Taken: Message routed to:  [facility] ([facility]): Colon and rec    Travel Screening: Not Applicable

## 2022-05-24 ENCOUNTER — TELEPHONE (OUTPATIENT)
Dept: GASTROENTEROLOGY | Facility: CLINIC | Age: 72
End: 2022-05-24
Payer: COMMERCIAL

## 2022-05-24 NOTE — TELEPHONE ENCOUNTER
Screening Questions  BlueKIND OF PREP RedLOCATION [review exclusion criteria] GreenSEDATION TYPE  1. Are you active on mychart? y    2. Ordering/Referring Provider: Rosangela Pritchett, APRN CNP    3. What insurance is in the chart? [employer]are     4. Do you have a legal guardian or medical Power of ?  Are you able to give consent for your medical care? n   (Sedation review/consideration needed)    3.   BMI 28.9 [BMI OVER 40-EXTENDED PREP]  If greater than 40 review exclusion criteria [PAC APPT IF @ [facility]]      4. Have you had a positive covid test in the last 90 days? N     5.  Respiratory Screening :  [If yes to any of the following HOSPITAL setting only]     Do you use daily home oxygen? N  Do you have mod to severe Obstructive Sleep Apnea? N [OKAY @ [facility] SH PH RI]   Do you have Pulmonary Hypertension? N   Do you have UNCONTROLLED asthma? N      6.   Have you had a heart or lung transplant? N      7.   Are you currently on dialysis? N [ If yes, G-PREP & HOSPITAL setting only]     8.   Do you have chronic kidney disease? N[ If yes, G-PREP ]    9.   Have you had a stroke or Transient ischemic attack (TIA - aka  mini stroke ) within 6 months?  N(If yes, please review exclusion criteria)    10.   In the past 6 months, have you had any heart related issues including cardiomyopathy or heart attack? N          If yes, did it require cardiac stenting or other implantable device? N      11.   Do you have any implantable devices in your body (pacemaker, defib, LVAD)? N (If yes, please review exclusion criteria)    12.   Do you take nitroglycerin? N           If yes, how often? N  (if yes, HOSPITAL setting ONLY)    13.   Are you currently taking any blood thinners? N           [IF YES, INFORM PATIENT TO FOLLOW UP W/ ORDERING PROVIDER FOR BRIDGING INSTRUCTIONS]     14.   Do you have a diagnosis of diabetes? N [ If yes, G-PREP ]    15.   [FEMALES] Are you currently pregnant? N    If yes, how many weeks?  N    16.   Are you taking any prescription pain medications on a routine schedule?  N [ If yes, EXTENDED PREP.] [If yes, MAC]    17.   Do you have any chemical dependencies such as alcohol, street drugs, or methadone?  N [If yes, MAC]    18.   Do you have any history of post-traumatic stress syndrome, severe anxiety or history of psychosis?  N [If yes, MAC]    19.   Do you transfer independently?  Y    20.  On a regular basis do you go 3-5 days between bowel movements? N [ If yes, EXTENDED PREP.]    21.   Preferred LOCAL Pharmacy for Pre Prescription   [id number] [address]      Scheduling Details      Caller :  Zac   (Please ask for phone number if not scheduled by patient)    Type of Procedure Scheduled: Lower Endoscopy [Colonoscopy]  Which Colonoscopy Prep was Sent?: [email or web address]    Surgeon: ulysses  Date of Procedure: 07/08  Location: [facility] [place]    Sedation Type: none    Conscious Sedation- Needs  for 6 hours after the procedure  MAC/General-Needs  for 24 hours after procedure    Pre-op Required at [facility] [place], [place] and OR for MAC sedation: n  (advise patient they will need a pre-op prior to procedure -)      Informed patient they will need an adult  y  Cannot take any type of public or medical transportation alone    Pre-Procedure Covid test to be completed at [facility] or Externally: up lab    Confirmed Nurse will call to complete assessment y    Additional comments:

## 2022-06-03 ENCOUNTER — TELEPHONE (OUTPATIENT)
Dept: GASTROENTEROLOGY | Facility: CLINIC | Age: 72
End: 2022-06-03
Payer: COMMERCIAL

## 2022-06-03 NOTE — TELEPHONE ENCOUNTER
"Outbound Call made to: Zac Rosenthal      Procedure: colonoscopy     Date, Location, and Surgeon of Procedure Cancelled:   07/08/2022 - ahmet jeffery       Reason for call (please be detailed, any staff messages or encounters to note?):     MD JULIAN - LACEY VACATION    ABLE TO SCHEDULE WITH  ANY PROVIDER          Rescheduled:     YES      If rescheduled:    Date: 07/21/2022   [place] [facility] [place] any change or update to original order/sedation:   ** PT REQUEST \"NO SEDATION\"                "

## 2022-07-13 ENCOUNTER — TELEPHONE (OUTPATIENT)
Dept: GASTROENTEROLOGY | Facility: CLINIC | Age: 72
End: 2022-07-13

## 2022-07-13 DIAGNOSIS — Z01.812 PRE-PROCEDURE LAB EXAM: Primary | ICD-10-CM

## 2022-07-13 NOTE — TELEPHONE ENCOUNTER
Pre assessment questions completed for upcoming colonoscopy procedure scheduled on 7.21.2022    COVID test 7.18.2022    Reviewed procedural arrival time 0910 and facility location [facility].    No sedation.    Anticoagulation/blood thinners? no    Electronic implanted devices? no    Reviewed Miralax/Magnesium citrate/Dulcolax prep instructions with patient. No fiber/iron supplements or foods that contain nuts/seeds prior to procedure.     Patient verbalized understanding and had no questions or concerns at this time.    Renee Villanueva RN

## 2022-07-18 ENCOUNTER — LAB (OUTPATIENT)
Dept: LAB | Facility: CLINIC | Age: 72
End: 2022-07-18
Payer: COMMERCIAL

## 2022-07-18 DIAGNOSIS — Z01.812 PRE-PROCEDURE LAB EXAM: ICD-10-CM

## 2022-07-18 PROCEDURE — U0005 INFEC AGEN DETEC AMPLI PROBE: HCPCS

## 2022-07-18 PROCEDURE — U0003 INFECTIOUS AGENT DETECTION BY NUCLEIC ACID (DNA OR RNA); SEVERE ACUTE RESPIRATORY SYNDROME CORONAVIRUS 2 (SARS-COV-2) (CORONAVIRUS DISEASE [COVID-19]), AMPLIFIED PROBE TECHNIQUE, MAKING USE OF HIGH THROUGHPUT TECHNOLOGIES AS DESCRIBED BY CMS-2020-01-R: HCPCS

## 2022-07-19 LAB — SARS-COV-2 RNA RESP QL NAA+PROBE: NEGATIVE

## 2022-07-21 ENCOUNTER — ANESTHESIA EVENT (OUTPATIENT)
Dept: SURGERY | Facility: AMBULATORY SURGERY CENTER | Age: 72
End: 2022-07-21
Payer: COMMERCIAL

## 2022-07-21 ENCOUNTER — ANESTHESIA (OUTPATIENT)
Dept: SURGERY | Facility: AMBULATORY SURGERY CENTER | Age: 72
End: 2022-07-21
Payer: COMMERCIAL

## 2022-07-21 ENCOUNTER — HOSPITAL ENCOUNTER (OUTPATIENT)
Facility: AMBULATORY SURGERY CENTER | Age: 72
Discharge: HOME | End: 2022-07-21
Attending: STUDENT IN AN ORGANIZED HEALTH CARE EDUCATION/TRAINING PROGRAM | Admitting: STUDENT IN AN ORGANIZED HEALTH CARE EDUCATION/TRAINING PROGRAM
Payer: COMMERCIAL

## 2022-07-21 VITALS
DIASTOLIC BLOOD PRESSURE: 75 MMHG | RESPIRATION RATE: 16 BRPM | WEIGHT: 198 LBS | TEMPERATURE: 97 F | HEIGHT: 70 IN | HEART RATE: 75 BPM | OXYGEN SATURATION: 98 % | BODY MASS INDEX: 28.35 KG/M2 | SYSTOLIC BLOOD PRESSURE: 128 MMHG

## 2022-07-21 DIAGNOSIS — K63.5 POLYP OF COLON, UNSPECIFIED PART OF COLON, UNSPECIFIED TYPE: Primary | ICD-10-CM

## 2022-07-21 LAB — COLONOSCOPY: NORMAL

## 2022-07-21 PROCEDURE — 88305 TISSUE EXAM BY PATHOLOGIST: CPT | Mod: 26 | Performed by: PATHOLOGY

## 2022-07-21 PROCEDURE — 45380 COLONOSCOPY AND BIOPSY: CPT | Mod: 59,PT

## 2022-07-21 PROCEDURE — 88305 TISSUE EXAM BY PATHOLOGIST: CPT | Mod: TC | Performed by: STUDENT IN AN ORGANIZED HEALTH CARE EDUCATION/TRAINING PROGRAM

## 2022-07-21 PROCEDURE — 45385 COLONOSCOPY W/LESION REMOVAL: CPT | Mod: PT

## 2022-07-21 RX ORDER — LIDOCAINE 40 MG/G
CREAM TOPICAL
Status: DISCONTINUED | OUTPATIENT
Start: 2022-07-21 | End: 2022-07-22 | Stop reason: HOSPADM

## 2022-07-21 RX ORDER — ONDANSETRON 2 MG/ML
4 INJECTION INTRAMUSCULAR; INTRAVENOUS
Status: DISCONTINUED | OUTPATIENT
Start: 2022-07-21 | End: 2022-07-22 | Stop reason: HOSPADM

## 2022-07-21 RX ORDER — SODIUM CHLORIDE, SODIUM LACTATE, POTASSIUM CHLORIDE, CALCIUM CHLORIDE 600; 310; 30; 20 MG/100ML; MG/100ML; MG/100ML; MG/100ML
INJECTION, SOLUTION INTRAVENOUS CONTINUOUS
Status: DISCONTINUED | OUTPATIENT
Start: 2022-07-21 | End: 2022-07-22 | Stop reason: HOSPADM

## 2022-07-21 NOTE — ANESTHESIA PREPROCEDURE EVALUATION
Anesthesia Pre-Procedure Evaluation    Patient: Zac Rosenthal   MRN: [id number] : 1950        Procedure : Procedure(s):  COLONOSCOPY          History reviewed. No pertinent past medical history.   History reviewed. No pertinent surgical history.   Allergies   Allergen Reactions     Amoxicillin       Social History     Tobacco Use     Smoking status: Never Smoker     Smokeless tobacco: Never Used   Substance Use Topics     Alcohol use: Yes     Alcohol/week: 0.0 standard drinks     Comment: 3x yr      Wt Readings from Last 1 Encounters:   22 89.8 kg (198 lb)        Anesthesia Evaluation            ROS/MED HX  ENT/Pulmonary:  - neg pulmonary ROS     Neurologic:  - neg neurologic ROS     Cardiovascular:     (+) hypertension-----    METS/Exercise Tolerance:     Hematologic:       Musculoskeletal:       GI/Hepatic:  - neg GI/hepatic ROS     Renal/Genitourinary:  - neg Renal ROS     Endo:  - neg endo ROS     Psychiatric/Substance Use:  - neg psychiatric ROS     Infectious Disease:  - neg infectious disease ROS   (+) HIV,     Malignancy:   (+) Malignancy, History of GI.GI CA Active status post.        Other:               OUTSIDE LABS:  CBC:   Lab Results   Component Value Date    WBC 7.3 2022    WBC 7.1 2021    HGB 16.2 2022    HGB 17.1 2021    HCT 46.5 2022    HCT 50.0 2021     2022     2021     BMP:   Lab Results   Component Value Date     (L) 2022     (L) 2021    POTASSIUM 4.4 2022    POTASSIUM 4.2 2021    CHLORIDE 98 2022    CHLORIDE 98 2021    CO2 27 2022    CO2 24 2021    BUN 10 2022    BUN 9 2021    CR 0.88 2022    CR 0.95 2021    GLC 87 2022    GLC 88 2021     COAGS: No results found for: PTT, INR, FIBR  POC: No results found for: BGM, HCG, HCGS  HEPATIC:   Lab Results   Component Value Date    ALBUMIN 4.0 2022    PROTTOTAL 6.9  04/12/2022    ALT 23 04/12/2022    AST 27 04/12/2022    ALKPHOS 77 04/12/2022    BILITOTAL 1.1 04/12/2022     OTHER:   Lab Results   Component Value Date    TONIO 9.1 04/12/2022    LIPASE 132 11/23/2015    AMYLASE 81 08/23/2018       Anesthesia Plan    ASA Status:  2      Anesthesia Type: MAC.     - Reason for MAC: immobility needed              Consents    Anesthesia Plan(s) and associated risks, benefits, and realistic alternatives discussed. Questions answered and patient/representative(s) expressed understanding.     - Discussed: Risks, Benefits and Alternatives for BOTH SEDATION and the PROCEDURE were discussed     - Discussed with:  Patient      - Extended Intubation/Ventilatory Support Discussed: No.      - Patient is DNR/DNI Status: No    Use of blood products discussed: No .     Postoperative Care    Pain management: Oral pain medications.        Comments:           H&P reviewed: Unable to attach H&P to encounter due to EHR limitations. H&P Update: appropriate H&P reviewed, patient examined. No interval changes since H&P (within 30 days).         Mikael Pedersen MD, MD

## 2022-07-22 LAB
PATH REPORT.COMMENTS IMP SPEC: NORMAL
PATH REPORT.FINAL DX SPEC: NORMAL
PATH REPORT.GROSS SPEC: NORMAL
PATH REPORT.MICROSCOPIC SPEC OTHER STN: NORMAL
PATH REPORT.RELEVANT HX SPEC: NORMAL
PHOTO IMAGE: NORMAL

## 2022-08-26 ENCOUNTER — OFFICE VISIT (OUTPATIENT)
Dept: SURGERY | Facility: CLINIC | Age: 72
End: 2022-08-26
Payer: COMMERCIAL

## 2022-08-26 VITALS
BODY MASS INDEX: 29.27 KG/M2 | OXYGEN SATURATION: 98 % | SYSTOLIC BLOOD PRESSURE: 142 MMHG | WEIGHT: 204 LBS | DIASTOLIC BLOOD PRESSURE: 83 MMHG | HEART RATE: 72 BPM | RESPIRATION RATE: 18 BRPM

## 2022-08-26 DIAGNOSIS — K62.82 ANAL DYSPLASIA: Primary | ICD-10-CM

## 2022-08-26 PROCEDURE — 46607 DIAGNOSTIC ANOSCOPY & BIOPSY: CPT | Performed by: NURSE PRACTITIONER

## 2022-08-26 PROCEDURE — 88305 TISSUE EXAM BY PATHOLOGIST: CPT | Mod: 26 | Performed by: PATHOLOGY

## 2022-08-26 PROCEDURE — 88305 TISSUE EXAM BY PATHOLOGIST: CPT | Mod: TC | Performed by: NURSE PRACTITIONER

## 2022-08-26 PROCEDURE — 2894A VOIDCORRECT: CPT | Mod: 25 | Performed by: NURSE PRACTITIONER

## 2022-08-26 PROCEDURE — 99214 OFFICE O/P EST MOD 30 MIN: CPT | Mod: 25 | Performed by: NURSE PRACTITIONER

## 2022-08-26 RX ORDER — LIDOCAINE HYDROCHLORIDE AND EPINEPHRINE 10; 10 MG/ML; UG/ML
10 INJECTION, SOLUTION INFILTRATION; PERINEURAL ONCE
Status: COMPLETED | OUTPATIENT
Start: 2022-08-26 | End: 2022-08-26

## 2022-08-26 RX ADMIN — LIDOCAINE HYDROCHLORIDE AND EPINEPHRINE 10 ML: 10; 10 INJECTION, SOLUTION INFILTRATION; PERINEURAL at 10:40

## 2022-08-26 NOTE — LETTER
2022       RE: Zac Rosenthal  [address]  [facility] [address]     Dear Colleague,    Thank you for referring your patient, Zac Rosenthal, to the [facility] COLON AND RECTAL SURGERY CLINIC [place] [facility]. Please see a copy of my visit note below.      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 He was  seen in our clinic for HRA on 4/15/2021 with high grade dysplasia that was fulgurated completely and negative anal cytology. His last high resolution anoscopy was on 22 with AIN 3 that was fulgurated in clinic. He presents today for repeat high resolution anoscopy.    HPI: Zac is not a smoker. His HIV is under good control. He has no anorectal complaints and no other concerns today.   His underwent a colonoscopy on 22 with two tubular adenomas. His brother had recurrent bladder cancer but is doing well.      ASSESSMENT: Written, informed consent was obtained prior to procedure.  Prior to the start of the procedure and with procedural staff participation, I verbally confirmed the patient s identity using two indicators, relevant allergies, that the procedure was appropriate and matched the consent or emergent situation, and that the correct equipment/implants were available. Immediately prior to starting the procedure I conducted the Time Out with the procedural staff and re-confirmed the patient s name, procedure, and site/side. (The Joint Commission universal protocol was followed.)  Yes    Sedation (Moderate or Deep): None    Anal cytology was not obtained today. Digital anal rectal exam was performed without any palpable lesions. Dilute acetic acid soak was completed for 2 minutes. Lubricant was used to insert the anoscope.  Performed high resolution microscopy using the colposcope. The dentate line was viewed in its entirety.  Some scattered acetowhitening with punctation throughout the anal canal, most prominently in the posterior anal canal at the dentate line and in the more distal anal canal circumferentially.  Biopsies were taken in the posterior position and in the right lateral anal canal using a baby Tischler forceps after injection with 1% lidocaine with epinephrine.  Fulguration was not performed and hemostasis was obtained using Monsel solution.  Perianal skin examined after acetic acid soak.  He does have an open area with some acetowhitening or punctation in the alina cleft.  This has been a chronic issue with him but due to its appearance, additional biopsy was obtained at this site.    PLAN: Follow-up with results of biopsies from today.  If low-grade dysplasia or normal, repeat high-resolution anoscopy in 6 months.  If high-grade dysplasia, would recommend topical Efudex or Aldara with repeat high resolution anoscopy in 4 months. Patient's questions were answered to his stated satisfaction and he is in agreement with this plan.  Patient's questions were answered to his stated satisfaction and he is in agreement with this plan.    For details of past medical history, surgical history, family history, medications, allergies, and review of systems, please see details below.    Medical history:  No past medical history on file.    Surgical history:  Past Surgical History:   Procedure Laterality Date     COLONOSCOPY N/A 2022    Procedure: COLONOSCOPY;  Surgeon: Brea Mathews MD;  [place] [facility] [place]       Family history:  Family History   Problem Relation Age of Onset     Cervical Cancer Mother      Lymphoma Mother      Other Cancer Mother      Myocardial Infarction Father      Heart Disease Brother      Coronary Artery Disease Brother        Medications:  Current Outpatient Medications   Medication Sig Dispense Refill      fluconazole (DIFLUCAN) 200 MG tablet Take 1 tablet by mouth daily. 90 tablet 3     ketoconazole (NIZORAL) 2 % external cream Apply topically daily 15 g 11     lisinopril (ZESTRIL) 40 MG tablet Take 1 tablet (40 mg) by mouth daily 90 tablet 3     STRIBILD [id number] mg tab Take 1 tablet by mouth daily with food 30 tablet 11     Allergies:  The patientis allergic to amoxicillin.    Social history:  Social History     Tobacco Use     Smoking status: Never Smoker     Smokeless tobacco: Never Used   Substance Use Topics     Alcohol use: Yes     Alcohol/week: 0.0 standard drinks     Comment: 3x yr     Marital status: single.    Review of Systems:  There are no exam notes on file for this visit.     This procedure was performed under a collaborative agreement with Dr. Young Alan MD, Chief of Colon and Rectal Surgery, [facility].      Rosangela Valentin NP-C  Colon and Rectal Surgery  [facility]

## 2022-08-26 NOTE — PROGRESS NOTES
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 He was  seen in our clinic for HRA on 4/15/2021 with high grade dysplasia that was fulgurated completely and negative anal cytology. His last high resolution anoscopy was on 22 with AIN 3 that was fulgurated in clinic. He presents today for repeat high resolution anoscopy.    HPI: Zac is not a smoker. His HIV is under good control. He has no anorectal complaints and no other concerns today.   His underwent a colonoscopy on 22 with two tubular adenomas. His brother had recurrent bladder cancer but is doing well.      ASSESSMENT: Written, informed consent was obtained prior to procedure.  Prior to the start of the procedure and with procedural staff participation, I verbally confirmed the patient s identity using two indicators, relevant allergies, that the procedure was appropriate and matched the consent or emergent situation, and that the correct equipment/implants were available. Immediately prior to starting the procedure I conducted the Time Out with the procedural staff and re-confirmed the patient s name, procedure, and site/side. (The Joint Commission universal protocol was followed.)  Yes    Sedation (Moderate or Deep): None    Anal cytology was not obtained today. Digital anal rectal exam was performed without any palpable lesions. Dilute acetic acid soak was completed for 2 minutes. Lubricant was used to insert the anoscope. Performed high resolution microscopy using the colposcope. The dentate line was viewed in its entirety.  Some scattered acetowhitening with punctation throughout the anal canal, most prominently in the posterior anal canal at the dentate line and in the more distal anal canal circumferentially.  Biopsies were taken in the posterior position and in  the right lateral anal canal using a baby Tischler forceps after injection with 1% lidocaine with epinephrine.  Fulguration was not performed and hemostasis was obtained using Monsel solution.  Perianal skin examined after acetic acid soak.  He does have an open area with some acetowhitening or punctation in the  cleft.  This has been a chronic issue with him but due to its appearance, additional biopsy was obtained at this site.    PLAN: Follow-up with results of biopsies from today.  If low-grade dysplasia or normal, repeat high-resolution anoscopy in 6 months.  If high-grade dysplasia, would recommend topical Efudex or Aldara with repeat high resolution anoscopy in 4 months. Patient's questions were answered to his stated satisfaction and he is in agreement with this plan.  Patient's questions were answered to his stated satisfaction and he is in agreement with this plan.    For details of past medical history, surgical history, family history, medications, allergies, and review of systems, please see details below.    Medical history:  No past medical history on file.    Surgical history:  Past Surgical History:   Procedure Laterality Date     COLONOSCOPY N/A 2022    Procedure: COLONOSCOPY;  Surgeon: Brea Mathews MD;  Location: [facility] [place]       Family history:  Family History   Problem Relation Age of Onset     Cervical Cancer Mother      Lymphoma Mother      Other Cancer Mother      Myocardial Infarction Father      Heart Disease Brother      Coronary Artery Disease Brother        Medications:  Current Outpatient Medications   Medication Sig Dispense Refill     fluconazole (DIFLUCAN) 200 MG tablet Take 1 tablet by mouth daily. 90 tablet 3     ketoconazole (NIZORAL) 2 % external cream Apply topically daily 15 g 11     lisinopril (ZESTRIL) 40 MG tablet Take 1 tablet (40 mg) by mouth daily 90 tablet 3     STRIBILD [phone] mg tab Take 1 tablet by mouth daily with food 30 tablet 11      Allergies:  The patientis allergic to amoxicillin.    Social history:  Social History     Tobacco Use     Smoking status: Never Smoker     Smokeless tobacco: Never Used   Substance Use Topics     Alcohol use: Yes     Alcohol/week: 0.0 standard drinks     Comment: 3x yr     Marital status: single.    Review of Systems:  There are no exam notes on file for this visit.     This procedure was performed under a collaborative agreement with Dr. Young Alan MD, Chief of Colon and Rectal Surgery, [facility].    Rosangela Valentin NP-C  Colon and Rectal Surgery  [facility]

## 2022-08-31 LAB
PATH REPORT.COMMENTS IMP SPEC: NORMAL
PATH REPORT.COMMENTS IMP SPEC: NORMAL
PATH REPORT.FINAL DX SPEC: NORMAL
PATH REPORT.GROSS SPEC: NORMAL
PATH REPORT.MICROSCOPIC SPEC OTHER STN: NORMAL
PATH REPORT.RELEVANT HX SPEC: NORMAL
PHOTO IMAGE: NORMAL

## 2022-09-10 ASSESSMENT — ENCOUNTER SYMPTOMS
NAUSEA: 0
WEAKNESS: 0
PALPITATIONS: 0
NERVOUS/ANXIOUS: 0
HEARTBURN: 0
DIARRHEA: 0
HEMATOCHEZIA: 0
CHILLS: 0
ARTHRALGIAS: 0
SORE THROAT: 0
SHORTNESS OF BREATH: 0
HEMATURIA: 0
MYALGIAS: 0
FEVER: 0
COUGH: 0
HEADACHES: 0
PARESTHESIAS: 0
ABDOMINAL PAIN: 0
DIZZINESS: 0
JOINT SWELLING: 0
DYSURIA: 0
CONSTIPATION: 0
EYE PAIN: 0
FREQUENCY: 0

## 2022-09-10 ASSESSMENT — ACTIVITIES OF DAILY LIVING (ADL): CURRENT_FUNCTION: NO ASSISTANCE NEEDED

## 2022-09-11 ENCOUNTER — HEALTH MAINTENANCE LETTER (OUTPATIENT)
Age: 72
End: 2022-09-11

## 2022-09-12 ENCOUNTER — VIRTUAL VISIT (OUTPATIENT)
Dept: FAMILY MEDICINE | Facility: CLINIC | Age: 72
End: 2022-09-12
Payer: COMMERCIAL

## 2022-09-12 VITALS — HEIGHT: 70 IN | WEIGHT: 197 LBS | BODY MASS INDEX: 28.2 KG/M2

## 2022-09-12 DIAGNOSIS — E87.1 HYPONATREMIA: ICD-10-CM

## 2022-09-12 DIAGNOSIS — Z21 ASYMPTOMATIC HIV INFECTION (H): ICD-10-CM

## 2022-09-12 DIAGNOSIS — B36.9 DERMATITIS FUNGAL: ICD-10-CM

## 2022-09-12 DIAGNOSIS — I10 ESSENTIAL HYPERTENSION: ICD-10-CM

## 2022-09-12 DIAGNOSIS — Z00.00 ENCOUNTER FOR MEDICARE ANNUAL WELLNESS EXAM: Primary | ICD-10-CM

## 2022-09-12 PROCEDURE — G0439 PPPS, SUBSEQ VISIT: HCPCS | Mod: 95 | Performed by: FAMILY MEDICINE

## 2022-09-12 RX ORDER — KETOCONAZOLE 20 MG/G
CREAM TOPICAL DAILY
Qty: 15 G | Refills: 11 | Status: SHIPPED | OUTPATIENT
Start: 2022-09-12 | End: 2023-08-14

## 2022-09-12 RX ORDER — LISINOPRIL 40 MG/1
40 TABLET ORAL DAILY
Qty: 90 TABLET | Refills: 3 | Status: SHIPPED | OUTPATIENT
Start: 2022-09-12 | End: 2023-08-14

## 2022-09-12 RX ORDER — FLUCONAZOLE 200 MG/1
TABLET ORAL
Qty: 90 TABLET | Refills: 3 | Status: SHIPPED | OUTPATIENT
Start: 2022-09-12 | End: 2023-08-14

## 2022-09-12 ASSESSMENT — ENCOUNTER SYMPTOMS
PALPITATIONS: 0
EYE PAIN: 0
ARTHRALGIAS: 0
JOINT SWELLING: 0
FREQUENCY: 0
MYALGIAS: 0
NERVOUS/ANXIOUS: 0
ABDOMINAL PAIN: 0
SHORTNESS OF BREATH: 0
WEAKNESS: 0
HEMATOCHEZIA: 0
PARESTHESIAS: 0
CONSTIPATION: 0
HEADACHES: 0
HEARTBURN: 0
DIARRHEA: 0
HEMATURIA: 0
FEVER: 0
CHILLS: 0
DYSURIA: 0
SORE THROAT: 0
DIZZINESS: 0
COUGH: 0
NAUSEA: 0

## 2022-09-12 ASSESSMENT — ACTIVITIES OF DAILY LIVING (ADL): CURRENT_FUNCTION: NO ASSISTANCE NEEDED

## 2022-09-12 NOTE — PROGRESS NOTES
"Jorge Alberto is a 71 year old who is being evaluated via a billable video visit.      How would you like to obtain your AVS? MyChart  If the video visit is dropped, the invitation should be resent by: Text to cell phone: [phone]  Will anyone else be joining your video visit? No        Assessment & Plan     Encounter for Medicare annual wellness exam      Essential hypertension  Stable  Labs with HIV labs  - lisinopril (ZESTRIL) 40 MG tablet; Take 1 tablet (40 mg) by mouth daily    Asymptomatic HIV infection (H)  Not undetectable but overall stable  Discussed follow-up with infectious disease  - fluconazole (DIFLUCAN) 200 MG tablet; Take 1 tablet by mouth daily.  - ketoconazole (NIZORAL) 2 % external cream; Apply topically daily    Dermatitis fungal  Chronic dermatitis or fungal problems    - fluconazole (DIFLUCAN) 200 MG tablet; Take 1 tablet by mouth daily.  - ketoconazole (NIZORAL) 2 % external cream; Apply topically daily    Hyponatremia  Asked about hyponatremia again this is asymptomatic looking back at his chart it seems that this started when he was first put on lisinopril  Since it is asymptomatic and lisinopril works better than his hydrochlorothiazide we will continue it for now               BMI:   Estimated body mass index is 28.27 kg/m  as calculated from the following:    Height as of this encounter: 1.778 m (5' 10\").    Weight as of this encounter: 89.4 kg (197 lb).           Return in about 53 weeks (around 9/18/2023) for Annual Wellness Visit.    Francis Cordon MD  [facility]    Subjective   Jorge Alberto is a 71 year old, presenting for the following health issues:  Medicare Visit    Refill medications     Healthy Habits:     In general, how would you rate your overall health?  Good    Frequency of exercise:  6-7 days/week    Duration of exercise:  Greater than 60 minutes    Do you usually eat at least 4 servings of fruit and vegetables a day, include whole grains    & " "fiber and avoid regularly eating high fat or \"junk\" foods?  Yes    Taking medications regularly:  Yes    Medication side effects:  None    Ability to successfully perform activities of daily living:  No assistance needed    Home Safety:  No safety concerns identified    Hearing Impairment:  No hearing concerns    In the past 6 months, have you been bothered by leaking of urine?  No    In general, how would you rate your overall mental or emotional health?  Good      PHQ-2 Total Score: 0    Additional concerns today:  No       Annual Wellness Visit    Patient has been advised of split billing requirements and indicates understanding: Yes     Are you in the first 12 months of your Medicare Part B coverage?  No          Ht 1.778 m (5' 10\")   Wt 89.4 kg (197 lb)   BMI 28.27 kg/m    Weight: Based on patient-provided information  Height: Based on patient-provided information  BMI: Provided by patient  Blood Pressure: Provided by patient    Mental Health:    In general, how would you rate your overall mental or emotional health? excellent  PHQ-2 Score: 0    Do you feel safe in your environment? Yes    Have you ever done Advance Care Planning? (For example, a Health Directive, POLST, or a discussion with a medical provider or your loved ones about your wishes)? Yes, advance care planning is on file.    Fall risk:  Fallen 2 or more times in the past year?: No  Any fall with injury in the past year?: No    Cognitive Screening: Unable to complete due to virtual visit; need for additional assessment in future face-to-face visit    Do you have sleep apnea, excessive snoring or daytime drowsiness?: no    Current providers sharing in care for this patient include:   Patient Care Team:  [facility] - General  Francis Cordon MD as Assigned PCP  Rosangela Pritchett APRN CNP as Assigned Surgical Provider  Carlos Emerson MD as Assigned Infectious Disease Provider    Patient has been " "advised of split billing requirements and indicates understanding: Yes  Hypertension Follow-up      Do you check your blood pressure regularly outside of the clinic? Yes    Are you following a low salt diet? Yes    Are your blood pressures ever more than 140 on the top number (systolic) OR more   than 90 on the bottom number (diastolic), for example 140/90? No      Review of Systems   Constitutional: Negative for chills and fever.   HENT: Negative for congestion, ear pain, hearing loss and sore throat.    Eyes: Negative for pain and visual disturbance.   Respiratory: Negative for cough and shortness of breath.    Cardiovascular: Negative for chest pain, palpitations and peripheral edema.   Gastrointestinal: Negative for abdominal pain, constipation, diarrhea, heartburn, hematochezia and nausea.   Genitourinary: Negative for dysuria, frequency, genital sores, hematuria, impotence, penile discharge and urgency.   Musculoskeletal: Negative for arthralgias, joint swelling and myalgias.   Skin: Negative for rash.   Neurological: Negative for dizziness, weakness, headaches and paresthesias.   Psychiatric/Behavioral: Negative for mood changes. The patient is not nervous/anxious.       Constitutional, HEENT, cardiovascular, pulmonary, gi and gu systems are negative, except as otherwise noted.      Objective    Vitals - Patient Reported  Systolic (Patient Reported): (!) 147  Diastolic (Patient Reported): 80  Weight (Patient Reported): 90.7 kg (200 lb)  Height (Patient Reported): 177.8 cm (5' 10\")  BMI (Based on Pt Reported Ht/Wt): 28.7  Pain Score: No Pain (0)      Vitals:  No vitals were obtained today due to virtual visit.    Physical Exam   GENERAL: Healthy, alert and no distress  EYES: Eyes grossly normal to inspection.  No discharge or erythema, or obvious scleral/conjunctival abnormalities.  RESP: No audible wheeze, cough, or visible cyanosis.  No visible retractions or increased work of breathing.    SKIN: Visible " skin clear. No significant rash, abnormal pigmentation or lesions.  NEURO: Cranial nerves grossly intact.  Mentation and speech appropriate for age.  PSYCH: Mentation appears normal, affect normal/bright, judgement and insight intact, normal speech and appearance well-groomed.          Video-Visit Details    Video Start Time: 1:00 PM    Type of service:  Video Visit    Video End Time:1:22 PM    Originating Location (pt. Location): Home    Distant Location (provider location):  [facility]     Platform used for Video Visit: [employer]

## 2022-09-12 NOTE — PATIENT INSTRUCTIONS
Patient Education   Personalized Prevention Plan  You are due for the preventive services outlined below.  Your care team is available to assist you in scheduling these services.  If you have already completed any of these items, please share that information with your care team to update in your medical record.  Health Maintenance Due   Topic Date Due     Meningitis A Vaccine (3 - Risk 2-dose series) 09/21/2020     Flu Vaccine (1) 09/01/2022     Annual Wellness Visit  09/23/2022     ANNUAL REVIEW OF HM ORDERS  09/23/2022

## 2022-10-04 ENCOUNTER — LAB (OUTPATIENT)
Dept: LAB | Facility: CLINIC | Age: 72
End: 2022-10-04
Payer: COMMERCIAL

## 2022-10-04 LAB
BASOPHILS # BLD AUTO: 0 10E3/UL (ref 0–0.2)
BASOPHILS NFR BLD AUTO: 0 %
EOSINOPHIL # BLD AUTO: 0.1 10E3/UL (ref 0–0.7)
EOSINOPHIL NFR BLD AUTO: 1 %
ERYTHROCYTE [DISTWIDTH] IN BLOOD BY AUTOMATED COUNT: 12.4 % (ref 10–15)
HCT VFR BLD AUTO: 46 % (ref 40–53)
HGB BLD-MCNC: 16.1 G/DL (ref 13.3–17.7)
LYMPHOCYTES # BLD AUTO: 2.7 10E3/UL (ref 0.8–5.3)
LYMPHOCYTES NFR BLD AUTO: 40 %
MCH RBC QN AUTO: 32.7 PG (ref 26.5–33)
MCHC RBC AUTO-ENTMCNC: 35 G/DL (ref 31.5–36.5)
MCV RBC AUTO: 94 FL (ref 78–100)
MONOCYTES # BLD AUTO: 0.7 10E3/UL (ref 0–1.3)
MONOCYTES NFR BLD AUTO: 11 %
NEUTROPHILS # BLD AUTO: 3.2 10E3/UL (ref 1.6–8.3)
NEUTROPHILS NFR BLD AUTO: 47 %
PLATELET # BLD AUTO: 255 10E3/UL (ref 150–450)
RBC # BLD AUTO: 4.92 10E6/UL (ref 4.4–5.9)
WBC # BLD AUTO: 6.8 10E3/UL (ref 4–11)

## 2022-10-04 PROCEDURE — 36415 COLL VENOUS BLD VENIPUNCTURE: CPT

## 2022-10-04 PROCEDURE — 85025 COMPLETE CBC W/AUTO DIFF WBC: CPT

## 2022-10-04 PROCEDURE — 80053 COMPREHEN METABOLIC PANEL: CPT

## 2022-10-04 PROCEDURE — 86360 T CELL ABSOLUTE COUNT/RATIO: CPT

## 2022-10-04 PROCEDURE — 86359 T CELLS TOTAL COUNT: CPT

## 2022-10-04 PROCEDURE — 87536 HIV-1 QUANT&REVRSE TRNSCRPJ: CPT

## 2022-10-05 LAB
ALBUMIN SERPL-MCNC: 3.9 G/DL (ref 3.4–5)
ALP SERPL-CCNC: 78 U/L (ref 40–150)
ALT SERPL W P-5'-P-CCNC: 21 U/L (ref 0–70)
ANION GAP SERPL CALCULATED.3IONS-SCNC: 5 MMOL/L (ref 3–14)
AST SERPL W P-5'-P-CCNC: 24 U/L (ref 0–45)
BILIRUB SERPL-MCNC: 0.8 MG/DL (ref 0.2–1.3)
BUN SERPL-MCNC: 13 MG/DL (ref 7–30)
CALCIUM SERPL-MCNC: 8.5 MG/DL (ref 8.5–10.1)
CD3 CELLS # BLD: 2186 CELLS/UL (ref 603–2990)
CD3 CELLS NFR BLD: 82 % (ref 49–84)
CD3+CD4+ CELLS # BLD: 1038 CELLS/UL (ref 441–2156)
CD3+CD4+ CELLS NFR BLD: 39 % (ref 28–63)
CD3+CD4+ CELLS/CD3+CD8+ CLL BLD: 0.92 % (ref 1.4–2.6)
CD3+CD8+ CELLS # BLD: 1125 CELLS/UL (ref 125–1312)
CD3+CD8+ CELLS NFR BLD: 42 % (ref 10–40)
CHLORIDE BLD-SCNC: 101 MMOL/L (ref 94–109)
CO2 SERPL-SCNC: 28 MMOL/L (ref 20–32)
CREAT SERPL-MCNC: 0.96 MG/DL (ref 0.66–1.25)
GFR SERPL CREATININE-BSD FRML MDRD: 84 ML/MIN/1.73M2
GLUCOSE BLD-MCNC: 89 MG/DL (ref 70–99)
POTASSIUM BLD-SCNC: 4.3 MMOL/L (ref 3.4–5.3)
PROT SERPL-MCNC: 6.7 G/DL (ref 6.8–8.8)
SODIUM SERPL-SCNC: 134 MMOL/L (ref 133–144)
T CELL COMMENT: ABNORMAL

## 2022-10-08 LAB — HIV1 RNA # PLAS NAA DL=20: NOT DETECTED COPIES/ML

## 2022-10-19 ENCOUNTER — OFFICE VISIT (OUTPATIENT)
Dept: INFECTIOUS DISEASES | Facility: CLINIC | Age: 72
End: 2022-10-19
Attending: STUDENT IN AN ORGANIZED HEALTH CARE EDUCATION/TRAINING PROGRAM
Payer: COMMERCIAL

## 2022-10-19 VITALS
OXYGEN SATURATION: 99 % | DIASTOLIC BLOOD PRESSURE: 83 MMHG | BODY MASS INDEX: 29.4 KG/M2 | HEART RATE: 65 BPM | SYSTOLIC BLOOD PRESSURE: 148 MMHG | WEIGHT: 204.9 LBS

## 2022-10-19 DIAGNOSIS — Z51.81 THERAPEUTIC DRUG MONITORING: ICD-10-CM

## 2022-10-19 DIAGNOSIS — Z13.220 LIPID SCREENING: ICD-10-CM

## 2022-10-19 DIAGNOSIS — Z23 IMMUNIZATION DUE: ICD-10-CM

## 2022-10-19 DIAGNOSIS — Z21 HUMAN IMMUNODEFICIENCY VIRUS I INFECTION (H): Primary | ICD-10-CM

## 2022-10-19 PROCEDURE — 99214 OFFICE O/P EST MOD 30 MIN: CPT | Performed by: STUDENT IN AN ORGANIZED HEALTH CARE EDUCATION/TRAINING PROGRAM

## 2022-10-19 PROCEDURE — 90734 MENACWYD/MENACWYCRM VACC IM: CPT | Performed by: STUDENT IN AN ORGANIZED HEALTH CARE EDUCATION/TRAINING PROGRAM

## 2022-10-19 PROCEDURE — 90471 IMMUNIZATION ADMIN: CPT | Performed by: STUDENT IN AN ORGANIZED HEALTH CARE EDUCATION/TRAINING PROGRAM

## 2022-10-19 PROCEDURE — 250N000021 HC RX MED A9270 GY (STAT IND- M) 250: Performed by: STUDENT IN AN ORGANIZED HEALTH CARE EDUCATION/TRAINING PROGRAM

## 2022-10-19 RX ADMIN — Medication 0.5 ML: at 15:29

## 2022-10-19 NOTE — LETTER
10/19/2022       RE: Zac Rosenthal  [address]  [place] [address]     Dear Colleague,    Thank you for referring your patient, Zac Rosenthal, to the [facility] [place] [facility]. Please see a copy of my visit note below.    Infectious Disease Clinic Follow Up Visit    HPI:  Zac Rosenthal is a 71 year old male who is here for follow-up on HIV treatment.  He was previously followed by Dr michelle Dacosta, I took over care once Dr Dacosta has retired from clinical duties.     He is currently taking Stribild once a day without missing doses and no side effects.     Had a high resolution anoscopy in May 2018, August 2018, and Feb 2019.  The cytology from Feb 2019 showed no abnormality. Follows with CRS, in Sep 2021 no evidence of high grade dysplasia. Plan for repeat high resolution anoscopy in 6 months, done 8/2022, with LGSIL (AIN!), follow up planned in 6 months.     Also had a colonoscopy which showed a tubular adenoma in April 2017.Colonoscopy recommended after 5 years from then, done 7/21/22 with two tubular adenomas.    He has an established primary care with family medicine, Dr. Francis Cordon, who has him on lisinpril for hypertension.      ROS:   ROS negative except as described above.      Medications:  Current Outpatient Medications   Medication Sig Dispense Refill     fluconazole (DIFLUCAN) 200 MG tablet Take 1 tablet by mouth daily. 90 tablet 3     ketoconazole (NIZORAL) 2 % external cream Apply topically daily 15 g 11     lisinopril (ZESTRIL) 40 MG tablet Take 1 tablet (40 mg) by mouth daily 90 tablet 3     STRIBILD [phone] mg tab Take 1 tablet by mouth daily with food 30 tablet 11       Exam:  BP (!) 148/83   Pulse 65   Wt 92.9 kg (204 lb 14.4 oz)   SpO2 99%   BMI 29.40 kg/m      Physical Exam   Constitutional: He is well-developed, well-nourished, and in no distress. No difficulty  breathing or speaking.  No cough  Neurological: He is alert. No gross FNDs  Psychiatric: Affect and judgment normal.   RS: CTAB  CVS: S1 S2 normal, RRR, No RMG  Skin: No obvious rashes    T Cell Subset:  Absolute CD4   Date Value Ref Range Status   05/18/2021 1,176 441 - 2,156 cells/uL Final     Absolute CD4, [place] T Cells   Date Value Ref Range Status   10/04/2022 1,038 441 - 2,156 cells/uL Final     CD4 [place] T   Date Value Ref Range Status   05/18/2021 37 28 - 63 % Final     CD4% [place] T Cells   Date Value Ref Range Status   10/04/2022 39 28 - 63 % Final     CD8 Suppressor T   Date Value Ref Range Status   05/18/2021 39 10 - 40 % Final     CD8% Suppressor T Cells   Date Value Ref Range Status   10/04/2022 42 (H) 10 - 40 % Final     CD3 Mature T   Date Value Ref Range Status   05/18/2021 77 49 - 84 % Final     CD3% Total T Cells   Date Value Ref Range Status   10/04/2022 82 49 - 84 % Final     CD4:CD8 Ratio   Date Value Ref Range Status   10/04/2022 0.92 (L) 1.40 - 2.60 Final   05/18/2021 0.95 (L) 1.40 - 2.60 Final     WBC   Date Value Ref Range Status   05/18/2021 7.1 4.0 - 11.0 10e9/L Final     WBC Count   Date Value Ref Range Status   10/04/2022 6.8 4.0 - 11.0 10e3/uL Final     % Lymphocytes   Date Value Ref Range Status   10/04/2022 40 % Final   05/18/2021 42.7 % Final     Absolute CD3   Date Value Ref Range Status   05/18/2021 2,483 603 - 2,990 cells/uL Final     Absolute CD3, Total T Cells   Date Value Ref Range Status   10/04/2022 2,186 603 - 2,990 cells/uL Final     Absolute CD8   Date Value Ref Range Status   05/18/2021 1,267 125 - 1,312 cells/uL Final     Absolute CD8, Suppressor T Cells   Date Value Ref Range Status   10/04/2022 1,125 125 - 1,312 cells/uL Final       HIV-1 RNA Quantitative:  HIV-1 RNA Quant Result   Date Value Ref Range Status   05/18/2021 21 (A) HIVND^HIV-1 RNA Not Detected [Copies]/mL Final     Comment:     The ANAT AmpliPrep/ANAT TaqMan HIV-1 test is an FDA-approved in vitro    nucleic acid amplification test for the quantitation of HIV-1 RNA in human   plasma (EDTA plasma) using the ANAT AmpliPrep instrument for automated viral   nucleic acid extraction and the ANAT TaqMan Analyzer or ANAT TaqMan for   automated Real Time PCR amplification and detection of the viral nucleic acid   target.  Titer results are reported in copies/ml. This assay is intended for use in   conjunction with clinical presentation and other laboratory markers of disease   prognosis and for use as an aid in assessing viral response to antiretroviral   treatment as measured by changes in plasma HIV-1 RNA levels. This test should   not be used as a donor screening test to confirm the presence of HIV-1   infection.       HIV-1 RNA copies/mL   Date Value Ref Range Status   10/04/2022 Not Detected Not Detected Copies/mL Final     HIV-1 RNA Copies/mL, Instrument   Date Value Ref Range Status   04/12/2022 38 (H) <1 copies/mL Final     HIV RNA QT Log   Date Value Ref Range Status   05/18/2021 1.3 (H) <1.3 [Log_copies]/mL Final     HIV-1 log   Date Value Ref Range Status   04/12/2022 1.6  Final       Lab Results   Component Value Date    WBC 7.1 08/24/2021    WBC 7.1 05/18/2021     Lab Results   Component Value Date    RBC 5.26 08/24/2021    RBC 5.14 05/18/2021     Lab Results   Component Value Date    HGB 17.1 08/24/2021    HGB 16.4 05/18/2021     Lab Results   Component Value Date    HCT 50.0 08/24/2021    HCT 48.3 05/18/2021     No components found for: MCT  Lab Results   Component Value Date    MCV 95 08/24/2021    MCV 94 05/18/2021     Lab Results   Component Value Date    MCH 32.5 08/24/2021    MCH 31.9 05/18/2021     Lab Results   Component Value Date    MCHC 34.2 08/24/2021    MCHC 34.0 05/18/2021     Lab Results   Component Value Date    RDW 12.4 08/24/2021    RDW 12.8 05/18/2021     Lab Results   Component Value Date     08/24/2021     05/18/2021       Last Comprehensive Metabolic Panel:  Sodium    Date Value Ref Range Status   10/04/2022 134 133 - 144 mmol/L Final   05/18/2021 133 133 - 144 mmol/L Final     Potassium   Date Value Ref Range Status   10/04/2022 4.3 3.4 - 5.3 mmol/L Final   05/18/2021 4.4 3.4 - 5.3 mmol/L Final     Chloride   Date Value Ref Range Status   10/04/2022 101 94 - 109 mmol/L Final   05/18/2021 100 94 - 109 mmol/L Final     Carbon Dioxide   Date Value Ref Range Status   05/18/2021 27 20 - 32 mmol/L Final     Carbon Dioxide (CO2)   Date Value Ref Range Status   10/04/2022 28 20 - 32 mmol/L Final     Anion Gap   Date Value Ref Range Status   10/04/2022 5 3 - 14 mmol/L Final   05/18/2021 6 3 - 14 mmol/L Final     Glucose   Date Value Ref Range Status   10/04/2022 89 70 - 99 mg/dL Final   05/18/2021 89 70 - 99 mg/dL Final     Urea Nitrogen   Date Value Ref Range Status   10/04/2022 13 7 - 30 mg/dL Final   05/18/2021 12 7 - 30 mg/dL Final     Creatinine   Date Value Ref Range Status   10/04/2022 0.96 0.66 - 1.25 mg/dL Final   05/18/2021 0.88 0.66 - 1.25 mg/dL Final     GFR Estimate   Date Value Ref Range Status   10/04/2022 84 >60 mL/min/1.73m2 Final     Comment:     Effective December 21, 2021 eGFRcr in adults is calculated using the 2021 CKD-EPI creatinine equation which includes age and gender (Fernando et al., NEJM, DOI: 10.1056/TIUXfr0369658)   05/18/2021 86 >60 mL/min/[1.73_m2] Final     Comment:     Non  GFR Calc  Starting 12/18/2018, serum creatinine based estimated GFR (eGFR) will be   calculated using the Chronic Kidney Disease Epidemiology Collaboration   (CKD-EPI) equation.       Calcium   Date Value Ref Range Status   10/04/2022 8.5 8.5 - 10.1 mg/dL Final   05/18/2021 9.3 8.5 - 10.1 mg/dL Final     Bilirubin Total   Date Value Ref Range Status   10/04/2022 0.8 0.2 - 1.3 mg/dL Final   05/18/2021 0.6 0.2 - 1.3 mg/dL Final     Alkaline Phosphatase   Date Value Ref Range Status   10/04/2022 78 40 - 150 U/L Final   05/18/2021 88 40 - 150 U/L Final     ALT   Date Value  Ref Range Status   10/04/2022 21 0 - 70 U/L Final   05/18/2021 20 0 - 70 U/L Final     AST   Date Value Ref Range Status   10/04/2022 24 0 - 45 U/L Final   05/18/2021 21 0 - 45 U/L Final       Assessment and Plan:  Zac Rosenthal is a 71 year old male who is here for followup on HIV treatment.    #HIV: Well controlled on Stribild. No missed doses. Will repeat HIV labs in 6 months w lipids and follow up in 1 year. Had some blips in viral load in 2/2021 to 188, since then undetectable in May 2021, 8/2021, and 10/22. CD4 1038 in 10/22    #HTN: The patient will follow up with his PCP to address his elevated BP. Continue lisinopril. 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 Subsequent follow-up again in Feb 2019 showed no dysplasia.  At follow up with CRS in 9/2021: No evidence of high grade dysplasia. 8/22, LGSIL(AIN1), follow up in 6 months planned    #Colonic tubular adenoma polyp in April 2017.  Colonoscopy recommended after 5 years from then. Repeat colonoscopy w 2 tubular adenomas in 7/22. Follow up per GI    #Low sodium, resolved.  On review, remained a bit low and stable since at least 2018, determination was he likely just has a chronically low level,plan to watch. Had diarrhea now improved after adjustign diet, NA now normal       Preventative Medicine  Cardiovascular Fidel:                        Lipids: normal in 9/2021, will check                       Blood Pressure: 148/83, as above  Cancer Screening:                       Colon: As above, follow up per gi                       Anal: Planned repeat eval in 6 months  Immunizations:                       Hepatitis A: 1/21/2000, 6/25/1999                       Hepatitis B: 7/13/2012, 1/21/2000, 7/28/1999, 6/25/1999                        Influenza: Done 9/2021                        Pneumovax/Prevnar PPSV 23 11/20/2014, 2/6/2009, 1/21/2000                       PCV 13 8/18/2017, 7/24/2013                       Tdap: 2008, 8/2017                       Td 1998                       Meningococcus: 9/21/2015, 7/23/2015                       Covid: 3 doses done  Bone Health: No screening indicated at this time  Renal Health: Cr: normal  Sexually Transmitted Infection Risk and Screening:                       Gonorrhea and Chlamydia: declined today                       Syphilis: declined today    Recs  Labs in 6 months including lipids  Follow up with me in 1 year, with labs 1 week prior  Meningitis vaccine today                Carlos Emerson   of Medicine  Division of Infectious Diseases

## 2022-10-19 NOTE — PATIENT INSTRUCTIONS
Labs in 6 months including lipids  Follow up with me in 1 year, with labs 1 week prior  Meningitis vaccine today

## 2022-10-19 NOTE — NURSING NOTE
Zac Rosenthal comes into clinic today at the request of Dr. Emerson, Ordering Provider for an immunization/s.    I gave the Menveo immunization/s while the patient was in clinic. They received an informational sheet and I explained the common side effects of the injection. The patient tolerated the procedure well and had no complications while here in clinic.     This service provided today was under the supervising provider of the day Dr. Emerson, who was available if needed.    Piyush Mane, EMT at 3:45 PM on 10/19/2022.

## 2022-10-24 NOTE — PROGRESS NOTES
Infectious Disease Clinic Follow Up Visit    HPI:  Zac Rosenthal is a 71 year old male who is here for follow-up on HIV treatment.  He was previously followed by Dr michelle Dacosta, I took over care once Dr Dacosta has retired from clinical duties.     He is currently taking Stribild once a day without missing doses and no side effects.     Had a high resolution anoscopy in May 2018, August 2018, and Feb 2019.  The cytology from Feb 2019 showed no abnormality. Follows with CRS, in Sep 2021 no evidence of high grade dysplasia. Plan for repeat high resolution anoscopy in 6 months, done 8/2022, with LGSIL (AIN!), follow up planned in 6 months.     Also had a colonoscopy which showed a tubular adenoma in April 2017.Colonoscopy recommended after 5 years from then, done 7/21/22 with two tubular adenomas.    He has an established primary care with family medicine, Dr. Francis Cordon, who has him on lisinpril for hypertension.      ROS:   ROS negative except as described above.      Medications:  Current Outpatient Medications   Medication Sig Dispense Refill     fluconazole (DIFLUCAN) 200 MG tablet Take 1 tablet by mouth daily. 90 tablet 3     ketoconazole (NIZORAL) 2 % external cream Apply topically daily 15 g 11     lisinopril (ZESTRIL) 40 MG tablet Take 1 tablet (40 mg) by mouth daily 90 tablet 3     STRIBILD [id number] mg tab Take 1 tablet by mouth daily with food 30 tablet 11       Exam:  BP (!) 148/83   Pulse 65   Wt 92.9 kg (204 lb 14.4 oz)   SpO2 99%   BMI 29.40 kg/m      Physical Exam   Constitutional: He is well-developed, well-nourished, and in no distress. No difficulty breathing or speaking.  No cough  Neurological: He is alert. No gross FNDs  Psychiatric: Affect and judgment normal.   RS: CTAB  CVS: S1 S2 normal, RRR, No RMG  Skin: No obvious rashes    T Cell Subset:  Absolute CD4   Date Value Ref Range Status   05/18/2021 1,176 441 - 2,156 cells/uL Final     Absolute CD4, [place] T Cells   Date  Value Ref Range Status   10/04/2022 1,038 441 - 2,156 cells/uL Final     CD4 [place] T   Date Value Ref Range Status   05/18/2021 37 28 - 63 % Final     CD4% [place] T Cells   Date Value Ref Range Status   10/04/2022 39 28 - 63 % Final     CD8 Suppressor T   Date Value Ref Range Status   05/18/2021 39 10 - 40 % Final     CD8% Suppressor T Cells   Date Value Ref Range Status   10/04/2022 42 (H) 10 - 40 % Final     CD3 Mature T   Date Value Ref Range Status   05/18/2021 77 49 - 84 % Final     CD3% Total T Cells   Date Value Ref Range Status   10/04/2022 82 49 - 84 % Final     CD4:CD8 Ratio   Date Value Ref Range Status   10/04/2022 0.92 (L) 1.40 - 2.60 Final   05/18/2021 0.95 (L) 1.40 - 2.60 Final     WBC   Date Value Ref Range Status   05/18/2021 7.1 4.0 - 11.0 10e9/L Final     WBC Count   Date Value Ref Range Status   10/04/2022 6.8 4.0 - 11.0 10e3/uL Final     % Lymphocytes   Date Value Ref Range Status   10/04/2022 40 % Final   05/18/2021 42.7 % Final     Absolute CD3   Date Value Ref Range Status   05/18/2021 2,483 603 - 2,990 cells/uL Final     Absolute CD3, Total T Cells   Date Value Ref Range Status   10/04/2022 2,186 603 - 2,990 cells/uL Final     Absolute CD8   Date Value Ref Range Status   05/18/2021 1,267 125 - 1,312 cells/uL Final     Absolute CD8, Suppressor T Cells   Date Value Ref Range Status   10/04/2022 1,125 125 - 1,312 cells/uL Final       HIV-1 RNA Quantitative:  HIV-1 RNA Quant Result   Date Value Ref Range Status   05/18/2021 21 (A) HIVND^HIV-1 RNA Not Detected [Copies]/mL Final     Comment:     The ANAT AmpliPrep/ANAT TaqMan HIV-1 test is an FDA-approved in vitro   nucleic acid amplification test for the quantitation of HIV-1 RNA in human   plasma (EDTA plasma) using the ANAT AmpliPrep instrument for automated viral   nucleic acid extraction and the ANAT TaqMan Analyzer or ANAT TaqMan for   automated Real Time PCR amplification and detection of the viral nucleic acid   target.  Titer  results are reported in copies/ml. This assay is intended for use in   conjunction with clinical presentation and other laboratory markers of disease   prognosis and for use as an aid in assessing viral response to antiretroviral   treatment as measured by changes in plasma HIV-1 RNA levels. This test should   not be used as a donor screening test to confirm the presence of HIV-1   infection.       HIV-1 RNA copies/mL   Date Value Ref Range Status   10/04/2022 Not Detected Not Detected Copies/mL Final     HIV-1 RNA Copies/mL, Instrument   Date Value Ref Range Status   04/12/2022 38 (H) <1 copies/mL Final     HIV RNA QT Log   Date Value Ref Range Status   05/18/2021 1.3 (H) <1.3 [Log_copies]/mL Final     HIV-1 log   Date Value Ref Range Status   04/12/2022 1.6  Final       Lab Results   Component Value Date    WBC 7.1 08/24/2021    WBC 7.1 05/18/2021     Lab Results   Component Value Date    RBC 5.26 08/24/2021    RBC 5.14 05/18/2021     Lab Results   Component Value Date    HGB 17.1 08/24/2021    HGB 16.4 05/18/2021     Lab Results   Component Value Date    HCT 50.0 08/24/2021    HCT 48.3 05/18/2021     No components found for: MCT  Lab Results   Component Value Date    MCV 95 08/24/2021    MCV 94 05/18/2021     Lab Results   Component Value Date    MCH 32.5 08/24/2021    MCH 31.9 05/18/2021     Lab Results   Component Value Date    MCHC 34.2 08/24/2021    MCHC 34.0 05/18/2021     Lab Results   Component Value Date    RDW 12.4 08/24/2021    RDW 12.8 05/18/2021     Lab Results   Component Value Date     08/24/2021     05/18/2021       Last Comprehensive Metabolic Panel:  Sodium   Date Value Ref Range Status   10/04/2022 134 133 - 144 mmol/L Final   05/18/2021 133 133 - 144 mmol/L Final     Potassium   Date Value Ref Range Status   10/04/2022 4.3 3.4 - 5.3 mmol/L Final   05/18/2021 4.4 3.4 - 5.3 mmol/L Final     Chloride   Date Value Ref Range Status   10/04/2022 101 94 - 109 mmol/L Final   05/18/2021  100 94 - 109 mmol/L Final     Carbon Dioxide   Date Value Ref Range Status   05/18/2021 27 20 - 32 mmol/L Final     Carbon Dioxide (CO2)   Date Value Ref Range Status   10/04/2022 28 20 - 32 mmol/L Final     Anion Gap   Date Value Ref Range Status   10/04/2022 5 3 - 14 mmol/L Final   05/18/2021 6 3 - 14 mmol/L Final     Glucose   Date Value Ref Range Status   10/04/2022 89 70 - 99 mg/dL Final   05/18/2021 89 70 - 99 mg/dL Final     Urea Nitrogen   Date Value Ref Range Status   10/04/2022 13 7 - 30 mg/dL Final   05/18/2021 12 7 - 30 mg/dL Final     Creatinine   Date Value Ref Range Status   10/04/2022 0.96 0.66 - 1.25 mg/dL Final   05/18/2021 0.88 0.66 - 1.25 mg/dL Final     GFR Estimate   Date Value Ref Range Status   10/04/2022 84 >60 mL/min/1.73m2 Final     Comment:     Effective December 21, 2021 eGFRcr in adults is calculated using the 2021 CKD-EPI creatinine equation which includes age and gender (Fernando et al., NEJM, DOI: 10.1056/OWLHpo4410085)   05/18/2021 86 >60 mL/min/[1.73_m2] Final     Comment:     Non  GFR Calc  Starting 12/18/2018, serum creatinine based estimated GFR (eGFR) will be   calculated using the Chronic Kidney Disease Epidemiology Collaboration   (CKD-EPI) equation.       Calcium   Date Value Ref Range Status   10/04/2022 8.5 8.5 - 10.1 mg/dL Final   05/18/2021 9.3 8.5 - 10.1 mg/dL Final     Bilirubin Total   Date Value Ref Range Status   10/04/2022 0.8 0.2 - 1.3 mg/dL Final   05/18/2021 0.6 0.2 - 1.3 mg/dL Final     Alkaline Phosphatase   Date Value Ref Range Status   10/04/2022 78 40 - 150 U/L Final   05/18/2021 88 40 - 150 U/L Final     ALT   Date Value Ref Range Status   10/04/2022 21 0 - 70 U/L Final   05/18/2021 20 0 - 70 U/L Final     AST   Date Value Ref Range Status   10/04/2022 24 0 - 45 U/L Final   05/18/2021 21 0 - 45 U/L Final       Assessment and Plan:  Zac Rosenthal is a 71 year old male who is here for followup on HIV treatment.    #HIV: Well controlled on  Stribild. No missed doses. Will repeat HIV labs in 6 months w lipids and follow up in 1 year. Had some blips in viral load in 2/2021 to 188, since then undetectable in May 2021, 8/2021, and 10/22. CD4 1038 in 10/22    #HTN: The patient will follow up with his PCP to address his elevated BP. Continue lisinopril. 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 Subsequent follow-up again in Feb 2019 showed no dysplasia.  At follow up with CRS in 9/2021: No evidence of high grade dysplasia. 8/22, LGSIL(AIN1), follow up in 6 months planned    #Colonic tubular adenoma polyp in April 2017.  Colonoscopy recommended after 5 years from then. Repeat colonoscopy w 2 tubular adenomas in 7/22. Follow up per GI    #Low sodium, resolved.  On review, remained a bit low and stable since at least 2018, determination was he likely just has a chronically low level,plan to watch. Had diarrhea now improved after adjustign diet, NA now normal       Preventative Medicine  Cardiovascular Fidel:                        Lipids: normal in 9/2021, will check                       Blood Pressure: 148/83, as above  Cancer Screening:                       Colon: As above, follow up per gi                       Anal: Planned repeat eval in 6 months  Immunizations:                       Hepatitis A: 1/21/2000, 6/25/1999                       Hepatitis B: 7/13/2012, 1/21/2000, 7/28/1999, 6/25/1999                        Influenza: Done 9/2021                       Pneumovax/Prevnar PPSV 23 11/20/2014, 2/6/2009, 1/21/2000                       PCV 13 8/18/2017, 7/24/2013                       Tdap: 2008, 8/2017                       Td 1998                       Meningococcus: 9/21/2015, 7/23/2015                       Covid: 3 doses done  Bone Health: No screening indicated at this time  Renal  Health: Cr: normal  Sexually Transmitted Infection Risk and Screening:                       Gonorrhea and Chlamydia: declined today                       Syphilis: declined today    Recs  Labs in 6 months including lipids  Follow up with me in 1 year, with labs 1 week prior  Meningitis vaccine today                Carlos Emerson   of Medicine  Division of Infectious Diseases

## 2023-02-16 ENCOUNTER — MYC MEDICAL ADVICE (OUTPATIENT)
Dept: SURGERY | Facility: CLINIC | Age: 73
End: 2023-02-16
Payer: COMMERCIAL

## 2023-02-16 NOTE — CONFIDENTIAL NOTE
Sent upcoming appointment reminder via An[employer].     Appt date/time: 2/24/2023 at 9:00 am  Provider: JUAN Marquez  Appt type: SHANIQUE Chow CMA

## 2023-02-27 ENCOUNTER — TELEPHONE (OUTPATIENT)
Dept: SURGERY | Facility: CLINIC | Age: 73
End: 2023-02-27
Payer: COMMERCIAL

## 2023-02-27 NOTE — TELEPHONE ENCOUNTER
VM msg received from patient:    Patient canceled his anal microscopy clinic appt with Rosangela Valentin NP, that was scheduled on 2/24/2023 via Pose due to inclement weather.    Forwarding message to Surgery Clinic Coordinators to please call patient to reschedule Clinic Microscopy appt with Rosangela Valentin NP.    Thank-you,    Mili Lawson  Ligia-op Coordinator  [facility]  Direct Phone: [phone]

## 2023-03-10 ENCOUNTER — OFFICE VISIT (OUTPATIENT)
Dept: SURGERY | Facility: CLINIC | Age: 73
End: 2023-03-10
Payer: COMMERCIAL

## 2023-03-10 VITALS
HEART RATE: 62 BPM | BODY MASS INDEX: 29.28 KG/M2 | DIASTOLIC BLOOD PRESSURE: 84 MMHG | SYSTOLIC BLOOD PRESSURE: 148 MMHG | HEIGHT: 70 IN | OXYGEN SATURATION: 99 % | WEIGHT: 204.5 LBS

## 2023-03-10 DIAGNOSIS — K62.82 ANAL DYSPLASIA: Primary | ICD-10-CM

## 2023-03-10 LAB
LAB DIRECTOR COMMENTS: NORMAL
LAB DIRECTOR DISCLAIMER: NORMAL
LAB DIRECTOR INTERPRETATION: NORMAL
LAB DIRECTOR METHODOLOGY: NORMAL
LAB DIRECTOR RESULTS: NORMAL
SPECIMEN DESCRIPTION: NORMAL

## 2023-03-10 PROCEDURE — 88112 CYTOPATH CELL ENHANCE TECH: CPT | Mod: 26 | Performed by: PATHOLOGY

## 2023-03-10 PROCEDURE — G0452 MOLECULAR PATHOLOGY INTERPR: HCPCS | Mod: 26 | Performed by: STUDENT IN AN ORGANIZED HEALTH CARE EDUCATION/TRAINING PROGRAM

## 2023-03-10 PROCEDURE — 46607 DIAGNOSTIC ANOSCOPY & BIOPSY: CPT | Performed by: NURSE PRACTITIONER

## 2023-03-10 PROCEDURE — 88305 TISSUE EXAM BY PATHOLOGIST: CPT | Mod: TC | Performed by: NURSE PRACTITIONER

## 2023-03-10 PROCEDURE — 88112 CYTOPATH CELL ENHANCE TECH: CPT | Mod: TC | Performed by: NURSE PRACTITIONER

## 2023-03-10 PROCEDURE — 88305 TISSUE EXAM BY PATHOLOGIST: CPT | Mod: 26 | Performed by: PATHOLOGY

## 2023-03-10 PROCEDURE — 87624 HPV HI-RISK TYP POOLED RSLT: CPT | Performed by: NURSE PRACTITIONER

## 2023-03-10 PROCEDURE — 99213 OFFICE O/P EST LOW 20 MIN: CPT | Mod: 25 | Performed by: NURSE PRACTITIONER

## 2023-03-10 RX ORDER — LIDOCAINE HYDROCHLORIDE 20 MG/ML
JELLY TOPICAL ONCE
Status: ACTIVE | OUTPATIENT
Start: 2023-03-10

## 2023-03-10 ASSESSMENT — PAIN SCALES - GENERAL: PAINLEVEL: NO PAIN (0)

## 2023-03-10 NOTE — NURSING NOTE
"Chief Complaint   Patient presents with     RECHECK     HRA       Vitals:    03/10/23 0918   BP: (!) 148/84   BP Location: Left arm   Patient Position: Sitting   Cuff Size: Adult Regular   Pulse: 62   SpO2: 99%   Weight: 204 lb 8 oz   Height: 5' 10\"       Body mass index is 29.34 kg/m .     Garret Ibarra, EMT- P    "

## 2023-03-10 NOTE — PROGRESS NOTES
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 He was seen in our clinic for HRA on 22 with AIN 1. Prior high grade dysplasia 22 with AIN 3 that was fulgurated completely. He presents today for repeat high resolution anoscopy.    HPI: Zac is not a smoker. His HIV is under good control. He has no anorectal complaints and no other concerns today.   His underwent a colonoscopy on 22 with two tubular adenomas. His brother had recurrent bladder cancer but is doing well.      ASSESSMENT: Written, informed consent was obtained prior to procedure.  Prior to the start of the procedure and with procedural staff participation, I verbally confirmed the patient s identity using two indicators, relevant allergies, that the procedure was appropriate and matched the consent or emergent situation, and that the correct equipment/implants were available. Immediately prior to starting the procedure I conducted the Time Out with the procedural staff and re-confirmed the patient s name, procedure, and site/side. (The Joint Commission universal protocol was followed.)  Yes    Sedation (Moderate or Deep): None    Anal cytology was not obtained today. Digital anal rectal exam was performed without any palpable lesions. Dilute acetic acid soak was completed for 2 minutes. Lubricant was used to insert the anoscope. Performed high resolution microscopy using the Proctostation. The dentate line was viewed in its entirety. Some bright acetowhitening in the distal anal canal, most prominently in the right lateral and posterior positions but with a small amount in the left anterior position.  Biopsy was taken in the right lateral distal anal canal using a baby Tischler forceps after injection with 1% lidocaine with epinephrine.  Fulguration was not  performed and hemostasis was obtained using Monsel solution.  Perianal skin examined after acetic acid soak. No additional acetowhitening or punctation.    PLAN: Follow-up with results of biopsies from today.  If low-grade dysplasia or normal, repeat high-resolution anoscopy in 6 months.  If high-grade dysplasia, would recommend repeat high resolution anoscopy in the OR with fulguration. Patient's questions were answered to his stated satisfaction and he is in agreement with this plan.  Patient's questions were answered to his stated satisfaction and he is in agreement with this plan.    For details of past medical history, surgical history, family history, medications, allergies, and review of systems, please see details below.    Medical history:  No past medical history on file.    Surgical history:  Past Surgical History:   Procedure Laterality Date     COLONOSCOPY N/A 7/21/2022    Procedure: COLONOSCOPY;  Surgeon: Brea Mathews MD;  [place] [facility] [place]       Family history:  Family History   Problem Relation Age of Onset     Cervical Cancer Mother      Lymphoma Mother      Other Cancer Mother      Myocardial Infarction Father      Heart Disease Brother      Coronary Artery Disease Brother        Medications:  Current Outpatient Medications   Medication Sig Dispense Refill     fluconazole (DIFLUCAN) 200 MG tablet Take 1 tablet by mouth daily. 90 tablet 3     ketoconazole (NIZORAL) 2 % external cream Apply topically daily 15 g 11     lisinopril (ZESTRIL) 40 MG tablet Take 1 tablet (40 mg) by mouth daily 90 tablet 3     STRIBILD [phone] mg tab Take 1 tablet by mouth daily with food 30 tablet 11     Allergies:  The patientis allergic to amoxicillin.    Social history:  Social History     Tobacco Use     Smoking status: Never     Smokeless tobacco: Never   Substance Use Topics     Alcohol use: Yes     Alcohol/week: 0.0 standard drinks     Comment: 3x yr     Marital status: single.    Review of Systems:  There  are no exam notes on file for this visit.     This procedure was performed under a collaborative agreement with Dr. Young Alan MD, Chief of Colon and Rectal Surgery, [facility].    GIO MarquezC  Colon and Rectal Surgery  [facility]

## 2023-03-10 NOTE — LETTER
3/10/2023       RE: Zac Rosenthal  [address]  [place] [facility] [facility] [place] [facility] [address]     Dear Colleague,    Thank you for referring your patient, Zac Rosenthal, to the [facility] [place] [facility]. Please see a copy of my visit note below.    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 He was seen in our clinic for HRA on 22 with AIN 1. Prior high grade dysplasia 22 with AIN 3 that was fulgurated completely. He presents today for repeat high resolution anoscopy.    HPI: Zac is not a smoker. His HIV is under good control. He has no anorectal complaints and no other concerns today.   His underwent a colonoscopy on 22 with two tubular adenomas. His brother had recurrent bladder cancer but is doing well.      ASSESSMENT: Written, informed consent was obtained prior to procedure.  Prior to the start of the procedure and with procedural staff participation, I verbally confirmed the patient s identity using two indicators, relevant allergies, that the procedure was appropriate and matched the consent or emergent situation, and that the correct equipment/implants were available. Immediately prior to starting the procedure I conducted the Time Out with the procedural staff and re-confirmed the patient s name, procedure, and site/side. (The Joint Commission universal protocol was followed.)  Yes    Sedation (Moderate or Deep): None    Anal cytology was not obtained today. Digital anal rectal exam was performed without any palpable lesions. Dilute acetic acid soak was completed for 2 minutes. Lubricant was used to insert the anoscope. Performed high resolution microscopy using the Proctostation. The dentate line was viewed in its  entirety. Some bright acetowhitening in the distal anal canal, most prominently in the right lateral and posterior positions but with a small amount in the left anterior position.  Biopsy was taken in the right lateral distal anal canal using a baby Tischler forceps after injection with 1% lidocaine with epinephrine.  Fulguration was not performed and hemostasis was obtained using Monsel solution.  Perianal skin examined after acetic acid soak. No additional acetowhitening or punctation.    PLAN: Follow-up with results of biopsies from today.  If low-grade dysplasia or normal, repeat high-resolution anoscopy in 6 months.  If high-grade dysplasia, would recommend repeat high resolution anoscopy in the OR with fulguration. Patient's questions were answered to his stated satisfaction and he is in agreement with this plan.  Patient's questions were answered to his stated satisfaction and he is in agreement with this plan.    For details of past medical history, surgical history, family history, medications, allergies, and review of systems, please see details below.    Medical history:  No past medical history on file.    Surgical history:  Past Surgical History:   Procedure Laterality Date     COLONOSCOPY N/A 7/21/2022    Procedure: COLONOSCOPY;  Surgeon: Brea Mathews MD;  Location: [facility] [place]       Family history:  Family History   Problem Relation Age of Onset     Cervical Cancer Mother      Lymphoma Mother      Other Cancer Mother      Myocardial Infarction Father      Heart Disease Brother      Coronary Artery Disease Brother        Medications:  Current Outpatient Medications   Medication Sig Dispense Refill     fluconazole (DIFLUCAN) 200 MG tablet Take 1 tablet by mouth daily. 90 tablet 3     ketoconazole (NIZORAL) 2 % external cream Apply topically daily 15 g 11     lisinopril (ZESTRIL) 40 MG tablet Take 1 tablet (40 mg) by mouth daily 90 tablet 3     STRIBILD [id number] mg tab Take 1 tablet by mouth daily  with food 30 tablet 11     Allergies:  The patientis allergic to amoxicillin.    Social history:  Social History     Tobacco Use     Smoking status: Never     Smokeless tobacco: Never   Substance Use Topics     Alcohol use: Yes     Alcohol/week: 0.0 standard drinks     Comment: 3x yr     Marital status: single.    Review of Systems:  There are no exam notes on file for this visit.     This procedure was performed under a collaborative agreement with Dr. Young Alan MD, Chief of Colon and Rectal Surgery, [facility].    Rosangela Valentin NP-C  Colon and Rectal Surgery  [facility]

## 2023-03-13 LAB
PATH REPORT.COMMENTS IMP SPEC: NORMAL
PATH REPORT.FINAL DX SPEC: NORMAL
PATH REPORT.FINAL DX SPEC: NORMAL
PATH REPORT.GROSS SPEC: NORMAL
PATH REPORT.GROSS SPEC: NORMAL
PATH REPORT.MICROSCOPIC SPEC OTHER STN: NORMAL
PATH REPORT.MICROSCOPIC SPEC OTHER STN: NORMAL
PATH REPORT.RELEVANT HX SPEC: NORMAL
PATH REPORT.RELEVANT HX SPEC: NORMAL
PHOTO IMAGE: NORMAL

## 2023-04-05 DIAGNOSIS — Z21 ASYMPTOMATIC HIV INFECTION, WITH NO HISTORY OF HIV-RELATED ILLNESS (H): Primary | ICD-10-CM

## 2023-04-05 RX ORDER — ELVITEGRAVIR, COBICISTAT, EMTRICITABINE, AND TENOFOVIR DISOPROXIL FUMARATE 150; 150; 200; 300 MG/1; MG/1; MG/1; MG/1
1 TABLET, FILM COATED ORAL
Qty: 30 TABLET | Refills: 5 | Status: SHIPPED | OUTPATIENT
Start: 2023-04-05 | End: 2023-10-23

## 2023-04-19 ENCOUNTER — LAB (OUTPATIENT)
Dept: LAB | Facility: CLINIC | Age: 73
End: 2023-04-19
Payer: COMMERCIAL

## 2023-04-19 DIAGNOSIS — Z13.220 LIPID SCREENING: ICD-10-CM

## 2023-04-19 DIAGNOSIS — Z21 HUMAN IMMUNODEFICIENCY VIRUS I INFECTION (H): ICD-10-CM

## 2023-04-19 LAB
ALBUMIN SERPL BCG-MCNC: 4.4 G/DL (ref 3.5–5.2)
ALP SERPL-CCNC: 77 U/L (ref 40–129)
ALT SERPL W P-5'-P-CCNC: 13 U/L (ref 10–50)
ANION GAP SERPL CALCULATED.3IONS-SCNC: 10 MMOL/L (ref 7–15)
AST SERPL W P-5'-P-CCNC: 30 U/L (ref 10–50)
BASOPHILS # BLD AUTO: 0 10E3/UL (ref 0–0.2)
BASOPHILS NFR BLD AUTO: 0 %
BILIRUB SERPL-MCNC: 0.9 MG/DL
BUN SERPL-MCNC: 12.2 MG/DL (ref 8–23)
CALCIUM SERPL-MCNC: 9.4 MG/DL (ref 8.8–10.2)
CHLORIDE SERPL-SCNC: 94 MMOL/L (ref 98–107)
CHOLEST SERPL-MCNC: 140 MG/DL
CREAT SERPL-MCNC: 0.93 MG/DL (ref 0.67–1.17)
DEPRECATED HCO3 PLAS-SCNC: 25 MMOL/L (ref 22–29)
EOSINOPHIL # BLD AUTO: 0.1 10E3/UL (ref 0–0.7)
EOSINOPHIL NFR BLD AUTO: 1 %
ERYTHROCYTE [DISTWIDTH] IN BLOOD BY AUTOMATED COUNT: 12.4 % (ref 10–15)
GFR SERPL CREATININE-BSD FRML MDRD: 87 ML/MIN/1.73M2
GLUCOSE SERPL-MCNC: 85 MG/DL (ref 70–99)
HCT VFR BLD AUTO: 46.4 % (ref 40–53)
HDLC SERPL-MCNC: 52 MG/DL
HGB BLD-MCNC: 16.6 G/DL (ref 13.3–17.7)
LDLC SERPL CALC-MCNC: 76 MG/DL
LYMPHOCYTES # BLD AUTO: 2.5 10E3/UL (ref 0.8–5.3)
LYMPHOCYTES NFR BLD AUTO: 35 %
MCH RBC QN AUTO: 32.7 PG (ref 26.5–33)
MCHC RBC AUTO-ENTMCNC: 35.8 G/DL (ref 31.5–36.5)
MCV RBC AUTO: 92 FL (ref 78–100)
MONOCYTES # BLD AUTO: 0.8 10E3/UL (ref 0–1.3)
MONOCYTES NFR BLD AUTO: 12 %
NEUTROPHILS # BLD AUTO: 3.7 10E3/UL (ref 1.6–8.3)
NEUTROPHILS NFR BLD AUTO: 52 %
NONHDLC SERPL-MCNC: 88 MG/DL
PLATELET # BLD AUTO: 281 10E3/UL (ref 150–450)
POTASSIUM SERPL-SCNC: 4.5 MMOL/L (ref 3.4–5.3)
PROT SERPL-MCNC: 6.8 G/DL (ref 6.4–8.3)
RBC # BLD AUTO: 5.07 10E6/UL (ref 4.4–5.9)
SODIUM SERPL-SCNC: 129 MMOL/L (ref 136–145)
TRIGL SERPL-MCNC: 59 MG/DL
WBC # BLD AUTO: 7.1 10E3/UL (ref 4–11)

## 2023-04-19 PROCEDURE — 86359 T CELLS TOTAL COUNT: CPT

## 2023-04-19 PROCEDURE — 87536 HIV-1 QUANT&REVRSE TRNSCRPJ: CPT

## 2023-04-19 PROCEDURE — 85025 COMPLETE CBC W/AUTO DIFF WBC: CPT

## 2023-04-19 PROCEDURE — 80053 COMPREHEN METABOLIC PANEL: CPT

## 2023-04-19 PROCEDURE — 86360 T CELL ABSOLUTE COUNT/RATIO: CPT

## 2023-04-19 PROCEDURE — 80061 LIPID PANEL: CPT

## 2023-04-19 PROCEDURE — 36415 COLL VENOUS BLD VENIPUNCTURE: CPT

## 2023-04-20 LAB
CD3 CELLS # BLD: 2325 CELLS/UL (ref 603–2990)
CD3 CELLS NFR BLD: 81 % (ref 49–84)
CD3+CD4+ CELLS # BLD: 1011 CELLS/UL (ref 441–2156)
CD3+CD4+ CELLS NFR BLD: 35 % (ref 28–63)
CD3+CD4+ CELLS/CD3+CD8+ CLL BLD: 0.79 % (ref 1.4–2.6)
CD3+CD8+ CELLS # BLD: 1279 CELLS/UL (ref 125–1312)
CD3+CD8+ CELLS NFR BLD: 45 % (ref 10–40)
T CELL COMMENT: ABNORMAL

## 2023-04-21 LAB — HIV1 RNA # PLAS NAA DL=20: NOT DETECTED COPIES/ML

## 2023-08-07 NOTE — PATIENT INSTRUCTIONS
Patient Education   Personalized Prevention Plan  You are due for the preventive services outlined below.  Your care team is available to assist you in scheduling these services.  If you have already completed any of these items, please share that information with your care team to update in your medical record.  Health Maintenance Due   Topic Date Due     Discuss Advance Care Planning  1950     Zoster (Shingles) Vaccine (1 of 2) 11/13/1999     Annual Wellness Visit  11/13/2014     AORTIC ANEURYSM SCREENING (SYSTEM ASSIGNED)  11/13/2014        
Anatomic Location From Referring Provider: right knee
Detail Level: Detailed
X Size Of Lesion In Cm (Optional): 0

## 2023-08-14 ENCOUNTER — OFFICE VISIT (OUTPATIENT)
Dept: FAMILY MEDICINE | Facility: CLINIC | Age: 73
End: 2023-08-14
Payer: COMMERCIAL

## 2023-08-14 VITALS
OXYGEN SATURATION: 98 % | HEIGHT: 70 IN | BODY MASS INDEX: 26.2 KG/M2 | TEMPERATURE: 97.4 F | SYSTOLIC BLOOD PRESSURE: 138 MMHG | RESPIRATION RATE: 16 BRPM | HEART RATE: 64 BPM | WEIGHT: 183 LBS | DIASTOLIC BLOOD PRESSURE: 86 MMHG

## 2023-08-14 DIAGNOSIS — E87.1 HYPONATREMIA: ICD-10-CM

## 2023-08-14 DIAGNOSIS — I10 ESSENTIAL HYPERTENSION: Primary | ICD-10-CM

## 2023-08-14 DIAGNOSIS — K62.82 ANAL DYSPLASIA: ICD-10-CM

## 2023-08-14 DIAGNOSIS — B36.9 DERMATITIS FUNGAL: ICD-10-CM

## 2023-08-14 DIAGNOSIS — Z21 ASYMPTOMATIC HIV INFECTION (H): ICD-10-CM

## 2023-08-14 PROCEDURE — 99214 OFFICE O/P EST MOD 30 MIN: CPT | Performed by: FAMILY MEDICINE

## 2023-08-14 RX ORDER — KETOCONAZOLE 20 MG/G
CREAM TOPICAL DAILY
Qty: 15 G | Refills: 11 | Status: SHIPPED | OUTPATIENT
Start: 2023-08-14

## 2023-08-14 RX ORDER — LISINOPRIL 40 MG/1
40 TABLET ORAL DAILY
Qty: 90 TABLET | Refills: 3 | Status: SHIPPED | OUTPATIENT
Start: 2023-08-14 | End: 2024-05-29

## 2023-08-14 RX ORDER — FLUCONAZOLE 200 MG/1
TABLET ORAL
Qty: 90 TABLET | Refills: 3 | Status: SHIPPED | OUTPATIENT
Start: 2023-08-14

## 2023-08-14 ASSESSMENT — PAIN SCALES - GENERAL: PAINLEVEL: NO PAIN (0)

## 2023-08-14 NOTE — PROGRESS NOTES
"  Assessment & Plan     Essential hypertension  Blood pressure is stable on lisinopril 40 mg daily.  Refills were given.  I encouraged him to keep getting regular physical activity and eat a healthy low-sodium diet.  - lisinopril (ZESTRIL) 40 MG tablet; Take 1 tablet (40 mg) by mouth daily    Asymptomatic HIV infection (H)  He will continue following closely with infectious disease.  This issue has been stable and well-controlled on Stribild.  - fluconazole (DIFLUCAN) 200 MG tablet; Take 1 tablet by mouth daily.  - ketoconazole (NIZORAL) 2 % external cream; Apply topically daily    Dermatitis fungal  He was given refills of fluconazole and topical ketoconazole which helped to control this issue which primarily affects the anal region he is also following with his colorectal specialist.  - fluconazole (DIFLUCAN) 200 MG tablet; Take 1 tablet by mouth daily.  - ketoconazole (NIZORAL) 2 % external cream; Apply topically daily    Hyponatremia  Sodium levels have been stable.  These are being checked every 6 months along with his other labs through infectious disease.    Anal dysplasia  He will continue to follow with his colorectal specialist. AIN-2 in May 2018 with burn out of lesion and follow up in August 2018 showed low grade (AIN-1) dysplasia. Follow-up in Feb 2019 showed no dysplasia.  His most recent results from 3/10/2023 showed low-grade (AIN 1) dysplasia.             BMI:   Estimated body mass index is 26.26 kg/m  as calculated from the following:    Height as of this encounter: 1.778 m (5' 10\").    Weight as of this encounter: 83 kg (183 lb).   Weight management plan: Discussed healthy diet and exercise guidelines        Alex Manzo DO  [facility]    Pamela Azul is a 72 year old, presenting for the following health issues:  Establish Care      8/14/2023     2:00 PM   Additional Questions   Roomed by Yojana SHELTON       History of Present Illness       Hypertension: He presents " for follow up of hypertension.  He does check blood pressure  regularly outside of the clinic. Outside blood pressures have been over 140/90. He follows a low salt diet.     He eats 4 or more servings of fruits and vegetables daily.He consumes 2 sweetened beverage(s) daily.He exercises with enough effort to increase his heart rate 30 to 60 minutes per day.  He exercises with enough effort to increase his heart rate 6 days per week.   He is taking medications regularly.           He has a medical history significant for hypertension, HIV, fungal dermatitis, hyponatremia and anal dysplasia.  He has been on lisinopril 40 mg daily which she tolerates well.  He also takes fluconazole and ketoconazole topically.  He follows closely with his colorectal specialist and he had his last colonoscopy on 7/21/2022 which showed 2 tubular adenomas.  They recommended a 5-year follow-up.  He gets labs checked every 6 months through his infectious disease specialist.  On 4/19/2023 his sodium was 129, chloride 94, potassium 4.5 and the rest of the CMP and CBC results were normal.  His HIV viral load has been undetectable over the past couple of checks.  He is doing well on Stribild.      Review of Systems   Constitutional, HEENT, cardiovascular, pulmonary, GI, , musculoskeletal, neuro, skin, endocrine and psych systems are negative, except as otherwise noted.      Objective    There were no vitals taken for this visit.  There is no height or weight on file to calculate BMI.  Physical Exam   GENERAL: healthy, alert and no distress  EYES: Eyes grossly normal to inspection, PERRL and conjunctivae and sclerae normal  HENT: ear canals and TM's normal, nose and mouth without ulcers or lesions  NECK: no adenopathy, no asymmetry, masses, or scars and thyroid normal to palpation  RESP: lungs clear to auscultation - no rales, rhonchi or wheezes  CV: regular rate and rhythm, normal S1 S2, no S3 or S4, no murmur, click or rub, no peripheral  edema and peripheral pulses strong  ABDOMEN: soft, nontender, no hepatosplenomegaly, no masses and bowel sounds normal  MS: no gross musculoskeletal defects noted, no edema  SKIN: no suspicious lesions or rashes  NEURO: Normal strength and tone, mentation intact and speech normal  PSYCH: mentation appears normal, affect normal/bright

## 2023-08-15 ENCOUNTER — PATIENT OUTREACH (OUTPATIENT)
Dept: CARE COORDINATION | Facility: CLINIC | Age: 73
End: 2023-08-15
Payer: COMMERCIAL

## 2023-08-29 ENCOUNTER — PATIENT OUTREACH (OUTPATIENT)
Dept: CARE COORDINATION | Facility: CLINIC | Age: 73
End: 2023-08-29
Payer: COMMERCIAL

## 2023-09-29 ENCOUNTER — OFFICE VISIT (OUTPATIENT)
Dept: SURGERY | Facility: CLINIC | Age: 73
End: 2023-09-29
Payer: COMMERCIAL

## 2023-09-29 DIAGNOSIS — K62.82 ANAL DYSPLASIA: Primary | ICD-10-CM

## 2023-09-29 PROCEDURE — 2894A VOIDCORRECT: CPT | Performed by: NURSE PRACTITIONER

## 2023-09-29 PROCEDURE — 88112 CYTOPATH CELL ENHANCE TECH: CPT | Mod: TC | Performed by: NURSE PRACTITIONER

## 2023-09-29 PROCEDURE — 88305 TISSUE EXAM BY PATHOLOGIST: CPT | Mod: 26 | Performed by: PATHOLOGY

## 2023-09-29 PROCEDURE — 87624 HPV HI-RISK TYP POOLED RSLT: CPT | Performed by: NURSE PRACTITIONER

## 2023-09-29 PROCEDURE — 2894A ZZHC ANOSCOPY DX, HRA W/ BIOPSY(IES): CPT | Performed by: NURSE PRACTITIONER

## 2023-09-29 PROCEDURE — 88112 CYTOPATH CELL ENHANCE TECH: CPT | Mod: 26 | Performed by: PATHOLOGY

## 2023-09-29 PROCEDURE — 88305 TISSUE EXAM BY PATHOLOGIST: CPT | Mod: TC | Performed by: NURSE PRACTITIONER

## 2023-09-29 RX ORDER — LIDOCAINE HYDROCHLORIDE 20 MG/ML
JELLY TOPICAL ONCE
Status: COMPLETED | OUTPATIENT
Start: 2023-09-29 | End: 2023-09-29

## 2023-09-29 RX ADMIN — LIDOCAINE HYDROCHLORIDE 1 TUBE: 20 JELLY TOPICAL at 13:34

## 2023-09-29 NOTE — PROGRESS NOTES
Colon and Rectal Surgery Clinic High Resolution Anoscopy Note    RE: Zac Rosenthal  : 1950  FLAQUITO: 2023    Zac Rosenthal is a 72 year old HIV positive male with a history of AIN 2-3 presents today for repeat HRA. He was seen in our clinic for HRA on 3/10/23 with AIN 1. He presents today for repeat high resolution anoscopy.    HPI: Zac is not a smoker. His HIV is under good control. He has no anorectal complaints and no other concerns today.   His underwent a colonoscopy on 22 with two tubular adenomas.     ASSESSMENT: Written, informed consent was obtained prior to procedure.  Prior to the start of the procedure and with procedural staff participation, I verbally confirmed the patient s identity using two indicators, relevant allergies, that the procedure was appropriate and matched the consent or emergent situation, and that the correct equipment/implants were available. Immediately prior to starting the procedure I conducted the Time Out with the procedural staff and re-confirmed the patient s name, procedure, and site/side. (The Joint Commission universal protocol was followed.)  Yes    Sedation (Moderate or Deep): None    Anal cytology was not obtained today. Digital anal rectal exam was performed without any palpable lesions. Dilute acetic acid soak was completed for 2 minutes. Lubricant was used to insert the anoscope. Performed high resolution microscopy using the colposcope. The dentate line was viewed in its entirety. Some bright acetowhitening at the dentate line in the right lateral position with punctation and this was Lugol's negative. Biopsy was taken at this site using a baby Tischler forceps and hemostasis was obtained using Monsel solution.  Fulguration was not performed.  Perianal skin examined after acetic acid soak. No additional acetowhitening or punctation.    PLAN: Follow-up with results of biopsy and HPV genotyping from today for follow up plan. Patient's questions  were answered to his stated satisfaction and he is in agreement with this plan.     For details of past medical history, surgical history, family history, medications, allergies, and review of systems, please see details below.    Medical history:  No past medical history on file.    Surgical history:  Past Surgical History:   Procedure Laterality Date    COLONOSCOPY N/A 7/21/2022    Procedure: COLONOSCOPY;  Surgeon: Brea Mathews MD;  Location: [facility] OR       Family history:  Family History   Problem Relation Age of Onset    Cervical Cancer Mother     Lymphoma Mother     Other Cancer Mother     Myocardial Infarction Father     Heart Disease Brother     Coronary Artery Disease Brother        Medications:  Current Outpatient Medications   Medication Sig Dispense Refill    fluconazole (DIFLUCAN) 200 MG tablet Take 1 tablet by mouth daily. 90 tablet 3    ketoconazole (NIZORAL) 2 % external cream Apply topically daily 15 g 11    lisinopril (ZESTRIL) 40 MG tablet Take 1 tablet (40 mg) by mouth daily 90 tablet 3    STRIBILD [id number] mg tab Take 1 tablet by mouth daily with food 30 tablet 5     Allergies:  The patientis allergic to amoxicillin.    Social history:  Social History     Tobacco Use    Smoking status: Never    Smokeless tobacco: Never   Substance Use Topics    Alcohol use: Yes     Alcohol/week: 0.0 standard drinks of alcohol     Comment: 3x yr     Marital status: single.    Review of Systems:  There are no exam notes on file for this visit.     This procedure was performed under a collaborative agreement with Dr. Young Alan MD, Chief of Colon and Rectal Surgery, [facility].    Rosangela Valentin NP-C  Colon and Rectal Surgery  [facility]

## 2023-09-29 NOTE — LETTER
2023       RE: Zac Rosenthal  [address]  [place] [id number]     Dear Colleague,    Thank you for referring your patient, Zac Rosenthal, to the [facility] [place] at [facility]. Please see a copy of my visit note below.    Colon and Rectal Surgery Clinic High Resolution Anoscopy Note    RE: Zac Rosenthal  : 1950  FLAQUITO: 2023    aZc Rosenthal is a 72 year old HIV positive male with a history of AIN 2-3 presents today for repeat HRA. He was seen in our clinic for HRA on 3/10/23 with AIN 1. He presents today for repeat high resolution anoscopy.    HPI: Zac is not a smoker. His HIV is under good control. He has no anorectal complaints and no other concerns today.   His underwent a colonoscopy on 22 with two tubular adenomas.     ASSESSMENT: Written, informed consent was obtained prior to procedure.  Prior to the start of the procedure and with procedural staff participation, I verbally confirmed the patient s identity using two indicators, relevant allergies, that the procedure was appropriate and matched the consent or emergent situation, and that the correct equipment/implants were available. Immediately prior to starting the procedure I conducted the Time Out with the procedural staff and re-confirmed the patient s name, procedure, and site/side. (The Joint Commission universal protocol was followed.)  Yes    Sedation (Moderate or Deep): None    Anal cytology was not obtained today. Digital anal rectal exam was performed without any palpable lesions. Dilute acetic acid soak was completed for 2 minutes. Lubricant was used to insert the anoscope. Performed high resolution microscopy using the colposcope. The dentate line was viewed in its entirety. Some bright acetowhitening at the dentate line in the right lateral position with punctation and this was Lugol's negative. Biopsy  was taken at this site using a baby Tischler forceps and hemostasis was obtained using Monsel solution.  Fulguration was not performed.  Perianal skin examined after acetic acid soak. No additional acetowhitening or punctation.    PLAN: Follow-up with results of biopsy and HPV genotyping from today for follow up plan. Patient's questions were answered to his stated satisfaction and he is in agreement with this plan.     For details of past medical history, surgical history, family history, medications, allergies, and review of systems, please see details below.    Medical history:  No past medical history on file.    Surgical history:  Past Surgical History:   Procedure Laterality Date    COLONOSCOPY N/A 7/21/2022    Procedure: COLONOSCOPY;  Surgeon: Brea Mathews MD;  Location: [facility] OR       Family history:  Family History   Problem Relation Age of Onset    Cervical Cancer Mother     Lymphoma Mother     Other Cancer Mother     Myocardial Infarction Father     Heart Disease Brother     Coronary Artery Disease Brother        Medications:  Current Outpatient Medications   Medication Sig Dispense Refill    fluconazole (DIFLUCAN) 200 MG tablet Take 1 tablet by mouth daily. 90 tablet 3    ketoconazole (NIZORAL) 2 % external cream Apply topically daily 15 g 11    lisinopril (ZESTRIL) 40 MG tablet Take 1 tablet (40 mg) by mouth daily 90 tablet 3    STRIBILD [id number] mg tab Take 1 tablet by mouth daily with food 30 tablet 5     Allergies:  The patientis allergic to amoxicillin.    Social history:  Social History     Tobacco Use    Smoking status: Never    Smokeless tobacco: Never   Substance Use Topics    Alcohol use: Yes     Alcohol/week: 0.0 standard drinks of alcohol     Comment: 3x yr     Marital status: single.    Review of Systems:  There are no exam notes on file for this visit.     This procedure was performed under a collaborative agreement with Dr. Young Alan MD, Chief of Colon and Rectal Surgery,  [facility].        Again, thank you for allowing me to participate in the care of your patient.      Sincerely,    JULY Monson CNP

## 2023-10-03 ENCOUNTER — LAB (OUTPATIENT)
Dept: LAB | Facility: CLINIC | Age: 73
End: 2023-10-03
Payer: COMMERCIAL

## 2023-10-03 DIAGNOSIS — Z21 HUMAN IMMUNODEFICIENCY VIRUS I INFECTION (H): ICD-10-CM

## 2023-10-03 LAB
BASO+EOS+MONOS # BLD AUTO: ABNORMAL 10*3/UL
BASO+EOS+MONOS NFR BLD AUTO: ABNORMAL %
BASOPHILS # BLD AUTO: 0 10E3/UL (ref 0–0.2)
BASOPHILS NFR BLD AUTO: 0 %
EOSINOPHIL # BLD AUTO: 0.1 10E3/UL (ref 0–0.7)
EOSINOPHIL NFR BLD AUTO: 1 %
ERYTHROCYTE [DISTWIDTH] IN BLOOD BY AUTOMATED COUNT: 12 % (ref 10–15)
HCT VFR BLD AUTO: 49 % (ref 40–53)
HGB BLD-MCNC: 17 G/DL (ref 13.3–17.7)
IMM GRANULOCYTES # BLD: 0.1 10E3/UL
IMM GRANULOCYTES NFR BLD: 0 %
LYMPHOCYTES # BLD AUTO: 3 10E3/UL (ref 0.8–5.3)
LYMPHOCYTES NFR BLD AUTO: 24 %
MCH RBC QN AUTO: 32.6 PG (ref 26.5–33)
MCHC RBC AUTO-ENTMCNC: 34.7 G/DL (ref 31.5–36.5)
MCV RBC AUTO: 94 FL (ref 78–100)
MONOCYTES # BLD AUTO: 1.1 10E3/UL (ref 0–1.3)
MONOCYTES NFR BLD AUTO: 8 %
NEUTROPHILS # BLD AUTO: 8.2 10E3/UL (ref 1.6–8.3)
NEUTROPHILS NFR BLD AUTO: 66 %
PATH REPORT.COMMENTS IMP SPEC: NORMAL
PATH REPORT.FINAL DX SPEC: NORMAL
PATH REPORT.FINAL DX SPEC: NORMAL
PATH REPORT.GROSS SPEC: NORMAL
PATH REPORT.GROSS SPEC: NORMAL
PATH REPORT.MICROSCOPIC SPEC OTHER STN: NORMAL
PATH REPORT.MICROSCOPIC SPEC OTHER STN: NORMAL
PATH REPORT.RELEVANT HX SPEC: NORMAL
PATH REPORT.RELEVANT HX SPEC: NORMAL
PHOTO IMAGE: NORMAL
PLATELET # BLD AUTO: 293 10E3/UL (ref 150–450)
RBC # BLD AUTO: 5.22 10E6/UL (ref 4.4–5.9)
WBC # BLD AUTO: 12.5 10E3/UL (ref 4–11)

## 2023-10-03 PROCEDURE — 80053 COMPREHEN METABOLIC PANEL: CPT

## 2023-10-03 PROCEDURE — 86359 T CELLS TOTAL COUNT: CPT

## 2023-10-03 PROCEDURE — 86360 T CELL ABSOLUTE COUNT/RATIO: CPT

## 2023-10-03 PROCEDURE — 87536 HIV-1 QUANT&REVRSE TRNSCRPJ: CPT

## 2023-10-03 PROCEDURE — 85025 COMPLETE CBC W/AUTO DIFF WBC: CPT

## 2023-10-03 PROCEDURE — 36415 COLL VENOUS BLD VENIPUNCTURE: CPT

## 2023-10-04 LAB
ALBUMIN SERPL BCG-MCNC: 4.3 G/DL (ref 3.5–5.2)
ALP SERPL-CCNC: 77 U/L (ref 40–129)
ALT SERPL W P-5'-P-CCNC: 14 U/L (ref 0–70)
ANION GAP SERPL CALCULATED.3IONS-SCNC: 11 MMOL/L (ref 7–15)
AST SERPL W P-5'-P-CCNC: 27 U/L (ref 0–45)
BILIRUB SERPL-MCNC: 0.7 MG/DL
BUN SERPL-MCNC: 11 MG/DL (ref 8–23)
CALCIUM SERPL-MCNC: 9.5 MG/DL (ref 8.8–10.2)
CD3 CELLS # BLD: 2516 CELLS/UL (ref 603–2990)
CD3 CELLS NFR BLD: 81 % (ref 49–84)
CD3+CD4+ CELLS # BLD: 1092 CELLS/UL (ref 441–2156)
CD3+CD4+ CELLS NFR BLD: 35 % (ref 28–63)
CD3+CD4+ CELLS/CD3+CD8+ CLL BLD: 0.78 % (ref 1.4–2.6)
CD3+CD8+ CELLS # BLD: 1393 CELLS/UL (ref 125–1312)
CD3+CD8+ CELLS NFR BLD: 45 % (ref 10–40)
CHLORIDE SERPL-SCNC: 96 MMOL/L (ref 98–107)
CREAT SERPL-MCNC: 0.95 MG/DL (ref 0.67–1.17)
DEPRECATED HCO3 PLAS-SCNC: 26 MMOL/L (ref 22–29)
EGFRCR SERPLBLD CKD-EPI 2021: 85 ML/MIN/1.73M2
GLUCOSE SERPL-MCNC: 83 MG/DL (ref 70–99)
POTASSIUM SERPL-SCNC: 4.5 MMOL/L (ref 3.4–5.3)
PROT SERPL-MCNC: 6.8 G/DL (ref 6.4–8.3)
SODIUM SERPL-SCNC: 133 MMOL/L (ref 135–145)
T CELL COMMENT: ABNORMAL

## 2023-10-05 LAB — HIV1 RNA # PLAS NAA DL=20: NOT DETECTED COPIES/ML

## 2023-10-07 ENCOUNTER — HEALTH MAINTENANCE LETTER (OUTPATIENT)
Age: 73
End: 2023-10-07

## 2023-10-16 ENCOUNTER — TELEPHONE (OUTPATIENT)
Dept: INFECTIOUS DISEASES | Facility: CLINIC | Age: 73
End: 2023-10-16
Payer: COMMERCIAL

## 2023-10-23 DIAGNOSIS — Z21 ASYMPTOMATIC HIV INFECTION, WITH NO HISTORY OF HIV-RELATED ILLNESS (H): ICD-10-CM

## 2023-10-23 RX ORDER — ELVITEGRAVIR, COBICISTAT, EMTRICITABINE, AND TENOFOVIR DISOPROXIL FUMARATE 150; 150; 200; 300 MG/1; MG/1; MG/1; MG/1
1 TABLET, FILM COATED ORAL
Qty: 30 TABLET | Refills: 3 | Status: SHIPPED | OUTPATIENT
Start: 2023-10-23 | End: 2023-11-29

## 2023-11-29 ENCOUNTER — OFFICE VISIT (OUTPATIENT)
Dept: INFECTIOUS DISEASES | Facility: CLINIC | Age: 73
End: 2023-11-29
Attending: STUDENT IN AN ORGANIZED HEALTH CARE EDUCATION/TRAINING PROGRAM
Payer: COMMERCIAL

## 2023-11-29 ENCOUNTER — LAB (OUTPATIENT)
Dept: LAB | Facility: CLINIC | Age: 73
End: 2023-11-29
Payer: COMMERCIAL

## 2023-11-29 VITALS
SYSTOLIC BLOOD PRESSURE: 153 MMHG | OXYGEN SATURATION: 97 % | HEART RATE: 65 BPM | BODY MASS INDEX: 29.27 KG/M2 | TEMPERATURE: 98 F | WEIGHT: 204 LBS | DIASTOLIC BLOOD PRESSURE: 86 MMHG

## 2023-11-29 DIAGNOSIS — Z21 ASYMPTOMATIC HIV INFECTION, WITH NO HISTORY OF HIV-RELATED ILLNESS (H): ICD-10-CM

## 2023-11-29 DIAGNOSIS — D72.829 LEUKOCYTOSIS, UNSPECIFIED TYPE: Primary | ICD-10-CM

## 2023-11-29 DIAGNOSIS — Z21 HUMAN IMMUNODEFICIENCY VIRUS I INFECTION (H): ICD-10-CM

## 2023-11-29 LAB
BASOPHILS # BLD AUTO: 0 10E3/UL (ref 0–0.2)
BASOPHILS NFR BLD AUTO: 0 %
EOSINOPHIL # BLD AUTO: 0.1 10E3/UL (ref 0–0.7)
EOSINOPHIL NFR BLD AUTO: 1 %
ERYTHROCYTE [DISTWIDTH] IN BLOOD BY AUTOMATED COUNT: 12.4 % (ref 10–15)
HCT VFR BLD AUTO: 47.2 % (ref 40–53)
HGB BLD-MCNC: 16.7 G/DL (ref 13.3–17.7)
IMM GRANULOCYTES # BLD: 0 10E3/UL
IMM GRANULOCYTES NFR BLD: 0 %
LYMPHOCYTES # BLD AUTO: 3.1 10E3/UL (ref 0.8–5.3)
LYMPHOCYTES NFR BLD AUTO: 34 %
MCH RBC QN AUTO: 32.9 PG (ref 26.5–33)
MCHC RBC AUTO-ENTMCNC: 35.4 G/DL (ref 31.5–36.5)
MCV RBC AUTO: 93 FL (ref 78–100)
MONOCYTES # BLD AUTO: 0.8 10E3/UL (ref 0–1.3)
MONOCYTES NFR BLD AUTO: 9 %
NEUTROPHILS # BLD AUTO: 5.1 10E3/UL (ref 1.6–8.3)
NEUTROPHILS NFR BLD AUTO: 56 %
NRBC # BLD AUTO: 0 10E3/UL
NRBC BLD AUTO-RTO: 0 /100
PLATELET # BLD AUTO: 272 10E3/UL (ref 150–450)
RBC # BLD AUTO: 5.08 10E6/UL (ref 4.4–5.9)
WBC # BLD AUTO: 9.2 10E3/UL (ref 4–11)

## 2023-11-29 PROCEDURE — 85025 COMPLETE CBC W/AUTO DIFF WBC: CPT | Performed by: PATHOLOGY

## 2023-11-29 PROCEDURE — 99215 OFFICE O/P EST HI 40 MIN: CPT | Performed by: STUDENT IN AN ORGANIZED HEALTH CARE EDUCATION/TRAINING PROGRAM

## 2023-11-29 PROCEDURE — G0463 HOSPITAL OUTPT CLINIC VISIT: HCPCS | Performed by: STUDENT IN AN ORGANIZED HEALTH CARE EDUCATION/TRAINING PROGRAM

## 2023-11-29 PROCEDURE — 36415 COLL VENOUS BLD VENIPUNCTURE: CPT | Performed by: PATHOLOGY

## 2023-11-29 RX ORDER — ELVITEGRAVIR, COBICISTAT, EMTRICITABINE, AND TENOFOVIR DISOPROXIL FUMARATE 150; 150; 200; 300 MG/1; MG/1; MG/1; MG/1
1 TABLET, FILM COATED ORAL
Qty: 30 TABLET | Refills: 11 | Status: SHIPPED | OUTPATIENT
Start: 2023-11-29

## 2023-11-29 NOTE — PROGRESS NOTES
Infectious Disease Clinic Follow Up Visit    HPI:  Zac Rosenthal is a 73 year old male who is here for follow-up on HIV treatment.  He was previously followed by Dr Victor Hugo Dacosta, I took over care once Dr Dacosta has retired from clinical duties.     He is currently taking Stribild once a day without missing doses and no side effects.     Had a high resolution anoscopy in May 2018, August 2018, and Feb 2019.  The cytology from Feb 2019 showed no abnormality. Follows with CRS, in Sep 2021 no evidence of high grade dysplasia. Plan for repeat high resolution anoscopy in 6 months, done 8/2022, with LGSIL (AIN1), Subsequent follow-up again in Feb 2019 showed no dysplasia.  At follow up with CRS in 9/2021: No evidence of high grade dysplasia. 8/22, LGSIL(AIN1), 3/23 LGSIL 9/23 HGSIL -planned CRS follow up    Also had a colonoscopy which showed a tubular adenoma in April 2017.Colonoscopy recommended after 5 years from then, done 7/21/22 with two tubular adenomas.    He has an established primary care with family medicine, Dr. Francis Cordon, who has him on lisinpril for hypertension.      Not sexually active, decline STI testing today    Planning [place] trip to see brother soon    Recent labs with elevated WBC. No cough, SOB, chest pain, abd pain, diarrhea, urinary issues, sore throat, joint pain or rash      Medications:  Current Outpatient Medications   Medication Sig Dispense Refill    fluconazole (DIFLUCAN) 200 MG tablet Take 1 tablet by mouth daily. 90 tablet 3    ketoconazole (NIZORAL) 2 % external cream Apply topically daily 15 g 11    lisinopril (ZESTRIL) 40 MG tablet Take 1 tablet (40 mg) by mouth daily 90 tablet 3    STRIBILD [phone] mg tab Take 1 tablet by mouth daily with food 30 tablet 11       Exam:  BP (!) 153/86   Pulse 65   Temp 98  F (36.7  C) (Oral)   Wt 92.5 kg (204 lb)   SpO2 97%   BMI 29.27 kg/m      Physical Exam   Constitutional: He is well-developed, well-nourished, and in no  distress. No difficulty breathing or speaking.  No cough  Neurological: He is alert. No gross FNDs  Psychiatric: Affect and judgment normal.   RS: CTAB  CVS: S1 S2 normal, RRR, No RMG  Skin: No obvious rashes    T Cell Subset:  Absolute CD4   Date Value Ref Range Status   05/18/2021 1,176 441 - 2,156 cells/uL Final     Absolute CD4, [place] T Cells   Date Value Ref Range Status   10/03/2023 1,092 441 - 2,156 cells/uL Final     CD4 [place] T   Date Value Ref Range Status   05/18/2021 37 28 - 63 % Final     CD4% [place] T Cells   Date Value Ref Range Status   10/03/2023 35 28 - 63 % Final     CD8 Suppressor T   Date Value Ref Range Status   05/18/2021 39 10 - 40 % Final     CD8% Suppressor T Cells   Date Value Ref Range Status   10/03/2023 45 (H) 10 - 40 % Final     CD3 Mature T   Date Value Ref Range Status   05/18/2021 77 49 - 84 % Final     CD3% Total T Cells   Date Value Ref Range Status   10/03/2023 81 49 - 84 % Final     CD4:CD8 Ratio   Date Value Ref Range Status   10/03/2023 0.78 (L) 1.40 - 2.60 Final   05/18/2021 0.95 (L) 1.40 - 2.60 Final     WBC   Date Value Ref Range Status   05/18/2021 7.1 4.0 - 11.0 10e9/L Final     WBC Count   Date Value Ref Range Status   11/29/2023 9.2 4.0 - 11.0 10e3/uL Final     % Lymphocytes   Date Value Ref Range Status   11/29/2023 34 % Final   05/18/2021 42.7 % Final     Absolute CD3   Date Value Ref Range Status   05/18/2021 2,483 603 - 2,990 cells/uL Final     Absolute CD3, Total T Cells   Date Value Ref Range Status   10/03/2023 2,516 603 - 2,990 cells/uL Final     Absolute CD8   Date Value Ref Range Status   05/18/2021 1,267 125 - 1,312 cells/uL Final     Absolute CD8, Suppressor T Cells   Date Value Ref Range Status   10/03/2023 1,393 (H) 125 - 1,312 cells/uL Final       HIV-1 RNA Quantitative:  HIV-1 RNA Quant Result   Date Value Ref Range Status   05/18/2021 21 (A) HIVND^HIV-1 RNA Not Detected [Copies]/mL Final     Comment:     The ANAT AmpliPrep/ANAT TaqMan HIV-1 test is  an FDA-approved in vitro   nucleic acid amplification test for the quantitation of HIV-1 RNA in human   plasma (EDTA plasma) using the ANAT AmpliPrep instrument for automated viral   nucleic acid extraction and the ANAT TaqMan Analyzer or ANAT TaqMan for   automated Real Time PCR amplification and detection of the viral nucleic acid   target.  Titer results are reported in copies/ml. This assay is intended for use in   conjunction with clinical presentation and other laboratory markers of disease   prognosis and for use as an aid in assessing viral response to antiretroviral   treatment as measured by changes in plasma HIV-1 RNA levels. This test should   not be used as a donor screening test to confirm the presence of HIV-1   infection.       HIV-1 RNA copies/mL   Date Value Ref Range Status   10/03/2023 Not Detected Not Detected Copies/mL Final   04/19/2023 Not Detected Not Detected Copies/mL Final     HIV-1 RNA Copies/mL, Instrument   Date Value Ref Range Status   04/12/2022 38 (H) <1 copies/mL Final     HIV RNA QT Log   Date Value Ref Range Status   05/18/2021 1.3 (H) <1.3 [Log_copies]/mL Final     HIV-1 log   Date Value Ref Range Status   04/12/2022 1.6  Final       Lab Results   Component Value Date    WBC 7.1 08/24/2021    WBC 7.1 05/18/2021     Lab Results   Component Value Date    RBC 5.26 08/24/2021    RBC 5.14 05/18/2021     Lab Results   Component Value Date    HGB 17.1 08/24/2021    HGB 16.4 05/18/2021     Lab Results   Component Value Date    HCT 50.0 08/24/2021    HCT 48.3 05/18/2021     No components found for: MCT  Lab Results   Component Value Date    MCV 95 08/24/2021    MCV 94 05/18/2021     Lab Results   Component Value Date    MCH 32.5 08/24/2021    MCH 31.9 05/18/2021     Lab Results   Component Value Date    MCHC 34.2 08/24/2021    MCHC 34.0 05/18/2021     Lab Results   Component Value Date    RDW 12.4 08/24/2021    RDW 12.8 05/18/2021     Lab Results   Component Value Date      08/24/2021     05/18/2021       Last Comprehensive Metabolic Panel:  Sodium   Date Value Ref Range Status   10/03/2023 133 (L) 135 - 145 mmol/L Final     Comment:     Reference intervals for this test were updated on 09/26/2023 to more accurately reflect our healthy population. There may be differences in the flagging of prior results with similar values performed with this method. Interpretation of those prior results can be made in the context of the updated reference intervals.    05/18/2021 133 133 - 144 mmol/L Final     Potassium   Date Value Ref Range Status   10/03/2023 4.5 3.4 - 5.3 mmol/L Final   10/04/2022 4.3 3.4 - 5.3 mmol/L Final   05/18/2021 4.4 3.4 - 5.3 mmol/L Final     Chloride   Date Value Ref Range Status   10/03/2023 96 (L) 98 - 107 mmol/L Final   10/04/2022 101 94 - 109 mmol/L Final   05/18/2021 100 94 - 109 mmol/L Final     Carbon Dioxide   Date Value Ref Range Status   05/18/2021 27 20 - 32 mmol/L Final     Carbon Dioxide (CO2)   Date Value Ref Range Status   10/03/2023 26 22 - 29 mmol/L Final   10/04/2022 28 20 - 32 mmol/L Final     Anion Gap   Date Value Ref Range Status   10/03/2023 11 7 - 15 mmol/L Final   10/04/2022 5 3 - 14 mmol/L Final   05/18/2021 6 3 - 14 mmol/L Final     Glucose   Date Value Ref Range Status   10/03/2023 83 70 - 99 mg/dL Final   10/04/2022 89 70 - 99 mg/dL Final   05/18/2021 89 70 - 99 mg/dL Final     Urea Nitrogen   Date Value Ref Range Status   10/03/2023 11.0 8.0 - 23.0 mg/dL Final   10/04/2022 13 7 - 30 mg/dL Final   05/18/2021 12 7 - 30 mg/dL Final     Creatinine   Date Value Ref Range Status   10/03/2023 0.95 0.67 - 1.17 mg/dL Final   05/18/2021 0.88 0.66 - 1.25 mg/dL Final     GFR Estimate   Date Value Ref Range Status   10/03/2023 85 >60 mL/min/1.73m2 Final   05/18/2021 86 >60 mL/min/[1.73_m2] Final     Comment:     Non  GFR Calc  Starting 12/18/2018, serum creatinine based estimated GFR (eGFR) will be   calculated using the Chronic  Kidney Disease Epidemiology Collaboration   (CKD-EPI) equation.       Calcium   Date Value Ref Range Status   10/03/2023 9.5 8.8 - 10.2 mg/dL Final   05/18/2021 9.3 8.5 - 10.1 mg/dL Final     Bilirubin Total   Date Value Ref Range Status   10/03/2023 0.7 <=1.2 mg/dL Final   05/18/2021 0.6 0.2 - 1.3 mg/dL Final     Alkaline Phosphatase   Date Value Ref Range Status   10/03/2023 77 40 - 129 U/L Final   05/18/2021 88 40 - 150 U/L Final     ALT   Date Value Ref Range Status   10/03/2023 14 0 - 70 U/L Final     Comment:     Reference intervals for this test were updated on 6/12/2023 to more accurately reflect our healthy population. There may be differences in the flagging of prior results with similar values performed with this method. Interpretation of those prior results can be made in the context of the updated reference intervals.     05/18/2021 20 0 - 70 U/L Final     AST   Date Value Ref Range Status   10/03/2023 27 0 - 45 U/L Final     Comment:     Reference intervals for this test were updated on 6/12/2023 to more accurately reflect our healthy population. There may be differences in the flagging of prior results with similar values performed with this method. Interpretation of those prior results can be made in the context of the updated reference intervals.   05/18/2021 21 0 - 45 U/L Final       Assessment and Plan:  Zac Rosenthal is a 73 year old male who is here for followup on HIV treatment.    #HIV: Well controlled on Stribild. No missed doses. Will repeat HIV labs in 6 months w lipids and follow up in 1 year. Had some blips in viral load in 2/2021 to 188, since then undetectable in May 2021, 8/2021,10/22, 10/23. CD4 1393 in 10/23    #Leucocytosis. Recent labs with elevated WBC. No localizing symptoms on ROS. Will repeat    #HTN: The patient will follow up with his PCP to address his elevated BP. Continue lisinopril. Dsicussed possibility of white coat HTN in past, home SBPs usually reported in  120-130s    #Anal dysplasia: AIN-2 in May 2018 with burn out of lesion and follow up in August 2018 showed low grade (AIN-1) dysplasia. Subsequent follow-up again in Feb 2019 showed no dysplasia.  At follow up with CRS in 9/2021: No evidence of high grade dysplasia. 8/22, LGSIL(AIN1), 3/23 LGSIL 9/23 HGSIL -planned CRS follow up    #Colonic tubular adenoma polyp in April 2017.  Colonoscopy recommended after 5 years from then. Repeat colonoscopy w 2 tubular adenomas in 7/22. Follow up per GI    #Low sodium, resolved.  On review, remained a bit low and stable since at least 2018, determination was he likely just has a chronically low level,plan to watch. Had diarrhea now improved after adjustign diet, NA now normal       Preventative Medicine  Cardiovascular Fidel:                        Lipids: normal in 4/23, will check                       Blood Pressure: 153/86, as above  Cancer Screening:                       Colon: As above, follow up per gi                       Anal: Planned repeat eval in 6 months  Immunizations:                       Hepatitis A: 1/21/2000, 6/25/1999                       Hepatitis B: 7/13/2012, 1/21/2000, 7/28/1999, 6/25/1999                        Influenza: Done 9/2021                       Pneumovax/Prevnar PPSV 23 11/20/2014, 2/6/2009, 1/21/2000                       PCV 13 8/18/2017, 7/24/2013                          - discussed PCV20 but based on shared decision making decided against, immunizations can be considered complete for pneumococcus                       Tdap: 2008, 8/2017                       Td 1998                       Meningococcus: 9/21/2015, 7/23/2015                       Covid: 3 doses done  Bone Health: No screening indicated at this time  Renal Health: Cr: normal  Sexually Transmitted Infection Risk and Screening:                       Gonorrhea and Chlamydia: declined today                       Syphilis: declined today    Recs  - Labs today to check on  WBC  - Labs in 6 months(fasting) and 1 year  - Follow up in 1 y  - Continue Stribild  - Follow up with Colorectal surgery as planned      Total time on day of visit including chart review, visit, counseling, documentation and coordination of care:  47 minutes                Carlos Emerson   of Medicine  Division of Infectious Diseases

## 2023-11-29 NOTE — LETTER
11/29/2023       RE: Zac Rosenthal  [address]  [facility] [address]     Dear Colleague,    Thank you for referring your patient, Zac Rosenthal, to the [facility] INFECTIOUS DISEASE CLINIC [place] at [facility]. Please see a copy of my visit note below.    Infectious Disease Clinic Follow Up Visit    HPI:  Zac Rosenthal is a 73 year old male who is here for follow-up on HIV treatment.  He was previously followed by Dr Victor Hugo Dacosta, I took over care once Dr Dacosta has retired from clinical duties.     He is currently taking Stribild once a day without missing doses and no side effects.     Had a high resolution anoscopy in May 2018, August 2018, and Feb 2019.  The cytology from Feb 2019 showed no abnormality. Follows with CRS, in Sep 2021 no evidence of high grade dysplasia. Plan for repeat high resolution anoscopy in 6 months, done 8/2022, with LGSIL (AIN1), Subsequent follow-up again in Feb 2019 showed no dysplasia.  At follow up with CRS in 9/2021: No evidence of high grade dysplasia. 8/22, LGSIL(AIN1), 3/23 LGSIL 9/23 HGSIL -planned CRS follow up    Also had a colonoscopy which showed a tubular adenoma in April 2017.Colonoscopy recommended after 5 years from then, done 7/21/22 with two tubular adenomas.    He has an established primary care with family medicine, Dr. Francis Cordon, who has him on lisinpril for hypertension.      Not sexually active, decline STI testing today    Planning [place] trip to see brother soon    Recent labs with elevated WBC. No cough, SOB, chest pain, abd pain, diarrhea, urinary issues, sore throat, joint pain or rash      Medications:  Current Outpatient Medications   Medication Sig Dispense Refill    fluconazole (DIFLUCAN) 200 MG tablet Take 1 tablet by mouth daily. 90 tablet 3    ketoconazole (NIZORAL) 2 % external cream Apply topically daily 15 g 11    lisinopril (ZESTRIL) 40 MG tablet Take 1  tablet (40 mg) by mouth daily 90 tablet 3    STRIBILD [phone] mg tab Take 1 tablet by mouth daily with food 30 tablet 11       Exam:  BP (!) 153/86   Pulse 65   Temp 98  F (36.7  C) (Oral)   Wt 92.5 kg (204 lb)   SpO2 97%   BMI 29.27 kg/m      Physical Exam   Constitutional: He is well-developed, well-nourished, and in no distress. No difficulty breathing or speaking.  No cough  Neurological: He is alert. No gross FNDs  Psychiatric: Affect and judgment normal.   RS: CTAB  CVS: S1 S2 normal, RRR, No RMG  Skin: No obvious rashes    T Cell Subset:  Absolute CD4   Date Value Ref Range Status   05/18/2021 1,176 441 - 2,156 cells/uL Final     Absolute CD4, [place] T Cells   Date Value Ref Range Status   10/03/2023 1,092 441 - 2,156 cells/uL Final     CD4 [place] T   Date Value Ref Range Status   05/18/2021 37 28 - 63 % Final     CD4% [place] T Cells   Date Value Ref Range Status   10/03/2023 35 28 - 63 % Final     CD8 Suppressor T   Date Value Ref Range Status   05/18/2021 39 10 - 40 % Final     CD8% Suppressor T Cells   Date Value Ref Range Status   10/03/2023 45 (H) 10 - 40 % Final     CD3 Mature T   Date Value Ref Range Status   05/18/2021 77 49 - 84 % Final     CD3% Total T Cells   Date Value Ref Range Status   10/03/2023 81 49 - 84 % Final     CD4:CD8 Ratio   Date Value Ref Range Status   10/03/2023 0.78 (L) 1.40 - 2.60 Final   05/18/2021 0.95 (L) 1.40 - 2.60 Final     WBC   Date Value Ref Range Status   05/18/2021 7.1 4.0 - 11.0 10e9/L Final     WBC Count   Date Value Ref Range Status   11/29/2023 9.2 4.0 - 11.0 10e3/uL Final     % Lymphocytes   Date Value Ref Range Status   11/29/2023 34 % Final   05/18/2021 42.7 % Final     Absolute CD3   Date Value Ref Range Status   05/18/2021 2,483 603 - 2,990 cells/uL Final     Absolute CD3, Total T Cells   Date Value Ref Range Status   10/03/2023 2,516 603 - 2,990 cells/uL Final     Absolute CD8   Date Value Ref Range Status   05/18/2021 1,267 125 - 1,312 cells/uL  Final     Absolute CD8, Suppressor T Cells   Date Value Ref Range Status   10/03/2023 1,393 (H) 125 - 1,312 cells/uL Final       HIV-1 RNA Quantitative:  HIV-1 RNA Quant Result   Date Value Ref Range Status   05/18/2021 21 (A) HIVND^HIV-1 RNA Not Detected [Copies]/mL Final     Comment:     The ANAT AmpliPrep/ANAT TaqMan HIV-1 test is an FDA-approved in vitro   nucleic acid amplification test for the quantitation of HIV-1 RNA in human   plasma (EDTA plasma) using the ANAT AmpliPrep instrument for automated viral   nucleic acid extraction and the ANAT TaqMan Analyzer or ANAT TaqMan for   automated Real Time PCR amplification and detection of the viral nucleic acid   target.  Titer results are reported in copies/ml. This assay is intended for use in   conjunction with clinical presentation and other laboratory markers of disease   prognosis and for use as an aid in assessing viral response to antiretroviral   treatment as measured by changes in plasma HIV-1 RNA levels. This test should   not be used as a donor screening test to confirm the presence of HIV-1   infection.       HIV-1 RNA copies/mL   Date Value Ref Range Status   10/03/2023 Not Detected Not Detected Copies/mL Final   04/19/2023 Not Detected Not Detected Copies/mL Final     HIV-1 RNA Copies/mL, Instrument   Date Value Ref Range Status   04/12/2022 38 (H) <1 copies/mL Final     HIV RNA QT Log   Date Value Ref Range Status   05/18/2021 1.3 (H) <1.3 [Log_copies]/mL Final     HIV-1 log   Date Value Ref Range Status   04/12/2022 1.6  Final       Lab Results   Component Value Date    WBC 7.1 08/24/2021    WBC 7.1 05/18/2021     Lab Results   Component Value Date    RBC 5.26 08/24/2021    RBC 5.14 05/18/2021     Lab Results   Component Value Date    HGB 17.1 08/24/2021    HGB 16.4 05/18/2021     Lab Results   Component Value Date    HCT 50.0 08/24/2021    HCT 48.3 05/18/2021     No components found for: MCT  Lab Results   Component Value Date    MCV 95  08/24/2021    MCV 94 05/18/2021     Lab Results   Component Value Date    MCH 32.5 08/24/2021    MCH 31.9 05/18/2021     Lab Results   Component Value Date    MCHC 34.2 08/24/2021    MCHC 34.0 05/18/2021     Lab Results   Component Value Date    RDW 12.4 08/24/2021    RDW 12.8 05/18/2021     Lab Results   Component Value Date     08/24/2021     05/18/2021       Last Comprehensive Metabolic Panel:  Sodium   Date Value Ref Range Status   10/03/2023 133 (L) 135 - 145 mmol/L Final     Comment:     Reference intervals for this test were updated on 09/26/2023 to more accurately reflect our healthy population. There may be differences in the flagging of prior results with similar values performed with this method. Interpretation of those prior results can be made in the context of the updated reference intervals.    05/18/2021 133 133 - 144 mmol/L Final     Potassium   Date Value Ref Range Status   10/03/2023 4.5 3.4 - 5.3 mmol/L Final   10/04/2022 4.3 3.4 - 5.3 mmol/L Final   05/18/2021 4.4 3.4 - 5.3 mmol/L Final     Chloride   Date Value Ref Range Status   10/03/2023 96 (L) 98 - 107 mmol/L Final   10/04/2022 101 94 - 109 mmol/L Final   05/18/2021 100 94 - 109 mmol/L Final     Carbon Dioxide   Date Value Ref Range Status   05/18/2021 27 20 - 32 mmol/L Final     Carbon Dioxide (CO2)   Date Value Ref Range Status   10/03/2023 26 22 - 29 mmol/L Final   10/04/2022 28 20 - 32 mmol/L Final     Anion Gap   Date Value Ref Range Status   10/03/2023 11 7 - 15 mmol/L Final   10/04/2022 5 3 - 14 mmol/L Final   05/18/2021 6 3 - 14 mmol/L Final     Glucose   Date Value Ref Range Status   10/03/2023 83 70 - 99 mg/dL Final   10/04/2022 89 70 - 99 mg/dL Final   05/18/2021 89 70 - 99 mg/dL Final     Urea Nitrogen   Date Value Ref Range Status   10/03/2023 11.0 8.0 - 23.0 mg/dL Final   10/04/2022 13 7 - 30 mg/dL Final   05/18/2021 12 7 - 30 mg/dL Final     Creatinine   Date Value Ref Range Status   10/03/2023 0.95 0.67 - 1.17  mg/dL Final   05/18/2021 0.88 0.66 - 1.25 mg/dL Final     GFR Estimate   Date Value Ref Range Status   10/03/2023 85 >60 mL/min/1.73m2 Final   05/18/2021 86 >60 mL/min/[1.73_m2] Final     Comment:     Non  GFR Calc  Starting 12/18/2018, serum creatinine based estimated GFR (eGFR) will be   calculated using the Chronic Kidney Disease Epidemiology Collaboration   (CKD-EPI) equation.       Calcium   Date Value Ref Range Status   10/03/2023 9.5 8.8 - 10.2 mg/dL Final   05/18/2021 9.3 8.5 - 10.1 mg/dL Final     Bilirubin Total   Date Value Ref Range Status   10/03/2023 0.7 <=1.2 mg/dL Final   05/18/2021 0.6 0.2 - 1.3 mg/dL Final     Alkaline Phosphatase   Date Value Ref Range Status   10/03/2023 77 40 - 129 U/L Final   05/18/2021 88 40 - 150 U/L Final     ALT   Date Value Ref Range Status   10/03/2023 14 0 - 70 U/L Final     Comment:     Reference intervals for this test were updated on 6/12/2023 to more accurately reflect our healthy population. There may be differences in the flagging of prior results with similar values performed with this method. Interpretation of those prior results can be made in the context of the updated reference intervals.     05/18/2021 20 0 - 70 U/L Final     AST   Date Value Ref Range Status   10/03/2023 27 0 - 45 U/L Final     Comment:     Reference intervals for this test were updated on 6/12/2023 to more accurately reflect our healthy population. There may be differences in the flagging of prior results with similar values performed with this method. Interpretation of those prior results can be made in the context of the updated reference intervals.   05/18/2021 21 0 - 45 U/L Final       Assessment and Plan:  Zac Rosenthal is a 73 year old male who is here for followup on HIV treatment.    #HIV: Well controlled on Stribild. No missed doses. Will repeat HIV labs in 6 months w lipids and follow up in 1 year. Had some blips in viral load in 2/2021 to 188, since then  undetectable in May 2021, 8/2021,10/22, 10/23. CD4 1393 in 10/23    #Leucocytosis. Recent labs with elevated WBC. No localizing symptoms on ROS. Will repeat    #HTN: The patient will follow up with his PCP to address his elevated BP. Continue lisinopril. 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 Subsequent follow-up again in Feb 2019 showed no dysplasia.  At follow up with CRS in 9/2021: No evidence of high grade dysplasia. 8/22, LGSIL(AIN1), 3/23 LGSIL 9/23 HGSIL -planned CRS follow up    #Colonic tubular adenoma polyp in April 2017.  Colonoscopy recommended after 5 years from then. Repeat colonoscopy w 2 tubular adenomas in 7/22. Follow up per GI    #Low sodium, resolved.  On review, remained a bit low and stable since at least 2018, determination was he likely just has a chronically low level,plan to watch. Had diarrhea now improved after adjustign diet, NA now normal       Preventative Medicine  Cardiovascular Fidel:                        Lipids: normal in 4/23, will check                       Blood Pressure: 153/86, as above  Cancer Screening:                       Colon: As above, follow up per gi                       Anal: Planned repeat eval in 6 months  Immunizations:                       Hepatitis A: 1/21/2000, 6/25/1999                       Hepatitis B: 7/13/2012, 1/21/2000, 7/28/1999, 6/25/1999                        Influenza: Done 9/2021                       Pneumovax/Prevnar PPSV 23 11/20/2014, 2/6/2009, 1/21/2000                       PCV 13 8/18/2017, 7/24/2013                          - discussed PCV20 but based on shared decision making decided against, immunizations can be considered complete for pneumococcus                       Tdap: 2008, 8/2017                       Td 1998                       Meningococcus:  9/21/2015, 7/23/2015                       Covid: 3 doses done  Bone Health: No screening indicated at this time  Renal Health: Cr: normal  Sexually Transmitted Infection Risk and Screening:                       Gonorrhea and Chlamydia: declined today                       Syphilis: declined today    Recs  - Labs today to check on WBC  - Labs in 6 months(fasting) and 1 year  - Follow up in 1 y  - Continue Stribild  - Follow up with Colorectal surgery as planned      Total time on day of visit including chart review, visit, counseling, documentation and coordination of care:  47 minutes                Carlos Emerson   of Medicine  Division of Infectious Diseases

## 2023-11-29 NOTE — PATIENT INSTRUCTIONS
- Labs today to check on WBC  - Labs in 6 months(fasting) and 1 year  - Follow up in 1 y  - Continue Stribild  - Follow up with Colorectal surgery as planed

## 2023-12-01 ENCOUNTER — OFFICE VISIT (OUTPATIENT)
Dept: SURGERY | Facility: CLINIC | Age: 73
End: 2023-12-01
Payer: COMMERCIAL

## 2023-12-01 DIAGNOSIS — K62.82 ANAL DYSPLASIA: Primary | ICD-10-CM

## 2023-12-01 PROCEDURE — 88112 CYTOPATH CELL ENHANCE TECH: CPT | Mod: 26 | Performed by: PATHOLOGY

## 2023-12-01 PROCEDURE — G0452 MOLECULAR PATHOLOGY INTERPR: HCPCS | Mod: 26 | Performed by: PATHOLOGY

## 2023-12-01 PROCEDURE — 88112 CYTOPATH CELL ENHANCE TECH: CPT | Mod: TC | Performed by: NURSE PRACTITIONER

## 2023-12-01 PROCEDURE — 46910 DESTRUCTION ANAL LESION(S): CPT | Performed by: NURSE PRACTITIONER

## 2023-12-01 PROCEDURE — 46607 DIAGNOSTIC ANOSCOPY & BIOPSY: CPT | Performed by: NURSE PRACTITIONER

## 2023-12-01 PROCEDURE — 87624 HPV HI-RISK TYP POOLED RSLT: CPT | Performed by: NURSE PRACTITIONER

## 2023-12-01 PROCEDURE — 99213 OFFICE O/P EST LOW 20 MIN: CPT | Mod: 25 | Performed by: NURSE PRACTITIONER

## 2023-12-01 PROCEDURE — 88305 TISSUE EXAM BY PATHOLOGIST: CPT | Mod: TC | Performed by: NURSE PRACTITIONER

## 2023-12-01 PROCEDURE — 88305 TISSUE EXAM BY PATHOLOGIST: CPT | Mod: 26 | Performed by: PATHOLOGY

## 2023-12-01 RX ORDER — LIDOCAINE HYDROCHLORIDE 20 MG/ML
JELLY TOPICAL ONCE
Status: COMPLETED | OUTPATIENT
Start: 2023-12-01 | End: 2023-12-01

## 2023-12-01 RX ADMIN — LIDOCAINE HYDROCHLORIDE 1 TUBE: 20 JELLY TOPICAL at 12:00

## 2023-12-01 NOTE — LETTER
2023       RE: Zac Rosenthal  [address]  [facility] [address]       Dear Colleague,    Thank you for referring your patient, Zac Rosenthal, to the [facility] COLON AND RECTAL SURGERY CLINIC [place] at [facility]. Please see a copy of my visit note below.    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 He was seen in our clinic for HRA on 23 with biopsy showing AIN 2. He presents today for repeat high resolution anoscopy.    HPI: Zac is not a smoker. His HIV is under good control. He has no anorectal complaints and no other concerns today.   His underwent a colonoscopy on 22 with two tubular adenomas.     ASSESSMENT: Written, informed consent was obtained prior to procedure.  Prior to the start of the procedure and with procedural staff participation, I verbally confirmed the patient s identity using two indicators, relevant allergies, that the procedure was appropriate and matched the consent or emergent situation, and that the correct equipment/implants were available. Immediately prior to starting the procedure I conducted the Time Out with the procedural staff and re-confirmed the patient s name, procedure, and site/side. (The Joint Commission universal protocol was followed.)  Yes    Sedation (Moderate or Deep): None    Anal cytology was not obtained today. Digital anal rectal exam was performed without any palpable lesions. Dilute acetic acid soak was completed for 2 minutes. Lubricant was used to insert the anoscope. Performed high resolution microscopy using the colposcope. The dentate line was viewed in its entirety. Some bright acetowhitening at the dentate line in the right lateral position with punctation and this was Lugol's  negative. Biopsy was taken at this site using a baby Tischler forceps and hemostasis was obtained using Monsel solution. The entire lesion was destroyed with cautery.  Perianal skin examined after acetic acid soak. No additional acetowhitening or punctation.    PLAN: Follow-up with results of biopsy and HPV genotyping from today for follow up plan. Patient's questions were answered to his stated satisfaction and he is in agreement with this plan.     For details of past medical history, surgical history, family history, medications, allergies, and review of systems, please see details below.    Medical history:  No past medical history on file.    Surgical history:  Past Surgical History:   Procedure Laterality Date    COLONOSCOPY N/A 7/21/2022    Procedure: COLONOSCOPY;  Surgeon: Brea Mathews MD;  [place] [facility] [place]       Family history:  Family History   Problem Relation Age of Onset    Cervical Cancer Mother     Lymphoma Mother     Other Cancer Mother     Myocardial Infarction Father     Heart Disease Brother     Coronary Artery Disease Brother        Medications:  Current Outpatient Medications   Medication Sig Dispense Refill    fluconazole (DIFLUCAN) 200 MG tablet Take 1 tablet by mouth daily. 90 tablet 3    ketoconazole (NIZORAL) 2 % external cream Apply topically daily 15 g 11    lisinopril (ZESTRIL) 40 MG tablet Take 1 tablet (40 mg) by mouth daily 90 tablet 3    STRIBILD [phone] mg tab Take 1 tablet by mouth daily with food 30 tablet 11     Allergies:  The patientis allergic to amoxicillin.    Social history:  Social History     Tobacco Use    Smoking status: Never    Smokeless tobacco: Never   Substance Use Topics    Alcohol use: Yes     Alcohol/week: 0.0 standard drinks of alcohol     Comment: 3x yr     Marital status: single.    Review of Systems:  There are no exam notes on file for this visit.     This procedure was performed under a collaborative agreement with Dr. Young Alan MD,  Chief of Colon and Rectal Surgery, [facility].          Again, thank you for allowing me to participate in the care of your patient.      Sincerely,    JULY Monson CNP

## 2023-12-01 NOTE — PROGRESS NOTES
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 He was seen in our clinic for HRA on 23 with biopsy showing AIN 2. He presents today for repeat high resolution anoscopy.    HPI: Zac is not a smoker. His HIV is under good control. He has no anorectal complaints and no other concerns today.   His underwent a colonoscopy on 22 with two tubular adenomas.     ASSESSMENT: Written, informed consent was obtained prior to procedure.  Prior to the start of the procedure and with procedural staff participation, I verbally confirmed the patient s identity using two indicators, relevant allergies, that the procedure was appropriate and matched the consent or emergent situation, and that the correct equipment/implants were available. Immediately prior to starting the procedure I conducted the Time Out with the procedural staff and re-confirmed the patient s name, procedure, and site/side. (The Joint Commission universal protocol was followed.)  Yes    Sedation (Moderate or Deep): None    Anal cytology was not obtained today. Digital anal rectal exam was performed without any palpable lesions. Dilute acetic acid soak was completed for 2 minutes. Lubricant was used to insert the anoscope. Performed high resolution microscopy using the colposcope. The dentate line was viewed in its entirety. Some bright acetowhitening at the dentate line in the right lateral position with punctation and this was Lugol's negative. Biopsy was taken at this site using a baby Tischler forceps and hemostasis was obtained using Monsel solution. The entire lesion was destroyed with cautery.  Perianal skin examined after acetic acid soak. No additional acetowhitening or punctation.    PLAN: Follow-up with results of biopsy and HPV genotyping from today for follow up  plan. Patient's questions were answered to his stated satisfaction and he is in agreement with this plan.     For details of past medical history, surgical history, family history, medications, allergies, and review of systems, please see details below.    Medical history:  No past medical history on file.    Surgical history:  Past Surgical History:   Procedure Laterality Date    COLONOSCOPY N/A 7/21/2022    Procedure: COLONOSCOPY;  Surgeon: Brea Mathews MD;  Location: [facility] OR       Family history:  Family History   Problem Relation Age of Onset    Cervical Cancer Mother     Lymphoma Mother     Other Cancer Mother     Myocardial Infarction Father     Heart Disease Brother     Coronary Artery Disease Brother        Medications:  Current Outpatient Medications   Medication Sig Dispense Refill    fluconazole (DIFLUCAN) 200 MG tablet Take 1 tablet by mouth daily. 90 tablet 3    ketoconazole (NIZORAL) 2 % external cream Apply topically daily 15 g 11    lisinopril (ZESTRIL) 40 MG tablet Take 1 tablet (40 mg) by mouth daily 90 tablet 3    STRIBILD [phone] mg tab Take 1 tablet by mouth daily with food 30 tablet 11     Allergies:  The patientis allergic to amoxicillin.    Social history:  Social History     Tobacco Use    Smoking status: Never    Smokeless tobacco: Never   Substance Use Topics    Alcohol use: Yes     Alcohol/week: 0.0 standard drinks of alcohol     Comment: 3x yr     Marital status: single.    Review of Systems:  There are no exam notes on file for this visit.     This procedure was performed under a collaborative agreement with Dr. Young Alan MD, Chief of Colon and Rectal Surgery, [facility].    Rosangela Valentin, NP-C  Colon and Rectal Surgery  [facility]

## 2023-12-05 LAB
PATH REPORT.COMMENTS IMP SPEC: ABNORMAL
PATH REPORT.COMMENTS IMP SPEC: YES
PATH REPORT.FINAL DX SPEC: ABNORMAL
PATH REPORT.GROSS SPEC: ABNORMAL
PATH REPORT.MICROSCOPIC SPEC OTHER STN: ABNORMAL
PATH REPORT.RELEVANT HX SPEC: ABNORMAL

## 2023-12-11 ENCOUNTER — PATIENT OUTREACH (OUTPATIENT)
Dept: GASTROENTEROLOGY | Facility: CLINIC | Age: 73
End: 2023-12-11
Payer: COMMERCIAL

## 2023-12-16 ENCOUNTER — HEALTH MAINTENANCE LETTER (OUTPATIENT)
Age: 73
End: 2023-12-16

## 2024-05-04 ENCOUNTER — HEALTH MAINTENANCE LETTER (OUTPATIENT)
Age: 74
End: 2024-05-04

## 2024-05-22 ENCOUNTER — LAB (OUTPATIENT)
Dept: LAB | Facility: CLINIC | Age: 74
End: 2024-05-22
Payer: COMMERCIAL

## 2024-05-22 DIAGNOSIS — Z21 HUMAN IMMUNODEFICIENCY VIRUS I INFECTION (H): ICD-10-CM

## 2024-05-22 LAB
ALBUMIN SERPL BCG-MCNC: 4.4 G/DL (ref 3.5–5.2)
ALP SERPL-CCNC: 81 U/L (ref 40–150)
ALT SERPL W P-5'-P-CCNC: 12 U/L (ref 0–70)
ANION GAP SERPL CALCULATED.3IONS-SCNC: 10 MMOL/L (ref 7–15)
AST SERPL W P-5'-P-CCNC: 25 U/L (ref 0–45)
BASOPHILS # BLD AUTO: 0 10E3/UL (ref 0–0.2)
BASOPHILS NFR BLD AUTO: 0 %
BILIRUB SERPL-MCNC: 0.8 MG/DL
BUN SERPL-MCNC: 9.5 MG/DL (ref 8–23)
CALCIUM SERPL-MCNC: 9.3 MG/DL (ref 8.8–10.2)
CD3 CELLS # BLD: 1621 CELLS/UL (ref 603–2990)
CD3 CELLS NFR BLD: 83 % (ref 49–84)
CD3+CD4+ CELLS # BLD: 667 CELLS/UL (ref 441–2156)
CD3+CD4+ CELLS NFR BLD: 34 % (ref 28–63)
CD3+CD4+ CELLS/CD3+CD8+ CLL BLD: 0.71 % (ref 1.4–2.6)
CD3+CD8+ CELLS # BLD: 939 CELLS/UL (ref 125–1312)
CD3+CD8+ CELLS NFR BLD: 48 % (ref 10–40)
CHLORIDE SERPL-SCNC: 94 MMOL/L (ref 98–107)
CHOLEST SERPL-MCNC: 144 MG/DL
CREAT SERPL-MCNC: 0.98 MG/DL (ref 0.67–1.17)
DEPRECATED HCO3 PLAS-SCNC: 25 MMOL/L (ref 22–29)
EGFRCR SERPLBLD CKD-EPI 2021: 81 ML/MIN/1.73M2
EOSINOPHIL # BLD AUTO: 0.1 10E3/UL (ref 0–0.7)
EOSINOPHIL NFR BLD AUTO: 1 %
ERYTHROCYTE [DISTWIDTH] IN BLOOD BY AUTOMATED COUNT: 11.8 % (ref 10–15)
FASTING STATUS PATIENT QL REPORTED: YES
FASTING STATUS PATIENT QL REPORTED: YES
GLUCOSE SERPL-MCNC: 93 MG/DL (ref 70–99)
HCT VFR BLD AUTO: 49.3 % (ref 40–53)
HDLC SERPL-MCNC: 54 MG/DL
HGB BLD-MCNC: 16.8 G/DL (ref 13.3–17.7)
IMM GRANULOCYTES # BLD: 0 10E3/UL
IMM GRANULOCYTES NFR BLD: 0 %
LDLC SERPL CALC-MCNC: 75 MG/DL
LYMPHOCYTES # BLD AUTO: 2 10E3/UL (ref 0.8–5.3)
LYMPHOCYTES NFR BLD AUTO: 30 %
MCH RBC QN AUTO: 31.8 PG (ref 26.5–33)
MCHC RBC AUTO-ENTMCNC: 34.1 G/DL (ref 31.5–36.5)
MCV RBC AUTO: 93 FL (ref 78–100)
MONOCYTES # BLD AUTO: 0.6 10E3/UL (ref 0–1.3)
MONOCYTES NFR BLD AUTO: 9 %
NEUTROPHILS # BLD AUTO: 4 10E3/UL (ref 1.6–8.3)
NEUTROPHILS NFR BLD AUTO: 60 %
NONHDLC SERPL-MCNC: 90 MG/DL
PLATELET # BLD AUTO: 288 10E3/UL (ref 150–450)
POTASSIUM SERPL-SCNC: 4.6 MMOL/L (ref 3.4–5.3)
PROT SERPL-MCNC: 6.8 G/DL (ref 6.4–8.3)
RBC # BLD AUTO: 5.28 10E6/UL (ref 4.4–5.9)
SODIUM SERPL-SCNC: 129 MMOL/L (ref 135–145)
T CELL COMMENT: ABNORMAL
TRIGL SERPL-MCNC: 77 MG/DL
WBC # BLD AUTO: 6.6 10E3/UL (ref 4–11)

## 2024-05-22 PROCEDURE — 85025 COMPLETE CBC W/AUTO DIFF WBC: CPT

## 2024-05-22 PROCEDURE — 86360 T CELL ABSOLUTE COUNT/RATIO: CPT

## 2024-05-22 PROCEDURE — 80053 COMPREHEN METABOLIC PANEL: CPT

## 2024-05-22 PROCEDURE — 36415 COLL VENOUS BLD VENIPUNCTURE: CPT

## 2024-05-22 PROCEDURE — 86359 T CELLS TOTAL COUNT: CPT

## 2024-05-22 PROCEDURE — 80061 LIPID PANEL: CPT | Mod: GZ

## 2024-05-22 PROCEDURE — 87536 HIV-1 QUANT&REVRSE TRNSCRPJ: CPT

## 2024-05-23 LAB
HIV1 RNA # PLAS NAA DL=20: 89 COPIES/ML
HIV1 RNA SERPL NAA+PROBE-LOG#: 1.9 {LOG_COPIES}/ML

## 2024-05-24 DIAGNOSIS — Z21 HUMAN IMMUNODEFICIENCY VIRUS I INFECTION (H): Primary | ICD-10-CM

## 2024-05-24 NOTE — PROGRESS NOTES
Ordering viral load alone for 3 months from now given lexie Emerson  Staff Physician  Division of Infectious Diseases

## 2024-05-29 ENCOUNTER — VIRTUAL VISIT (OUTPATIENT)
Dept: FAMILY MEDICINE | Facility: CLINIC | Age: 74
End: 2024-05-29
Payer: COMMERCIAL

## 2024-05-29 DIAGNOSIS — I10 ESSENTIAL HYPERTENSION: ICD-10-CM

## 2024-05-29 DIAGNOSIS — E87.1 HYPONATREMIA: Primary | ICD-10-CM

## 2024-05-29 DIAGNOSIS — B20 HUMAN IMMUNODEFICIENCY VIRUS (HIV) DISEASE (H): ICD-10-CM

## 2024-05-29 DIAGNOSIS — Z13.220 SCREENING CHOLESTEROL LEVEL: ICD-10-CM

## 2024-05-29 PROCEDURE — 99214 OFFICE O/P EST MOD 30 MIN: CPT | Mod: 95 | Performed by: FAMILY MEDICINE

## 2024-05-29 RX ORDER — LISINOPRIL 20 MG/1
20 TABLET ORAL DAILY
Qty: 90 TABLET | Refills: 0 | Status: SHIPPED | OUTPATIENT
Start: 2024-05-29 | End: 2024-08-08

## 2024-05-29 RX ORDER — AMLODIPINE BESYLATE 5 MG/1
5 TABLET ORAL DAILY
Qty: 90 TABLET | Refills: 0 | Status: SHIPPED | OUTPATIENT
Start: 2024-05-29 | End: 2024-08-08

## 2024-05-29 NOTE — PROGRESS NOTES
Jorge Alberto is a 73 year old who is being evaluated via a billable video visit.    How would you like to obtain your AVS? MyChart  If the video visit is dropped, the invitation should be resent by: Text to cell phone: [phone]  Will anyone else be joining your video visit? No      Assessment & Plan     Hyponatremia  We decided to adjust his blood pressure medication by lowering the lisinopril dose and starting amlodipine to see if this helps increase the sodium levels.  He is going to repeat labs in a couple of months and we will reassess at that time.  - Basic metabolic panel  (Ca, Cl, CO2, Creat, Gluc, K, Na, BUN); Future    Essential hypertension  We decided to decrease lisinopril to 20 mg daily and add amlodipine 5 mg daily.  He is going to return to the clinic to have his BMP rechecked in 2 months.  He will continue to watch his blood pressure closely at home.  - amLODIPine (NORVASC) 5 MG tablet; Take 1 tablet (5 mg) by mouth daily  - lisinopril (ZESTRIL) 20 MG tablet; Take 1 tablet (20 mg) by mouth daily  - Basic metabolic panel  (Ca, Cl, CO2, Creat, Gluc, K, Na, BUN); Future    Human immunodeficiency virus (HIV) disease (H)  This issue is stable on Stribild.  He continues to follow closely with his infectious disease specialist.    Screening cholesterol level  He asked me to put in a lab order for next spring to have his fasting cholesterol checked.  - Lipid Profile (Chol, Trig, HDL, LDL calc); Future                Subjective   Jorge Alberto is a 73 year old, presenting for the following health issues:  Recheck Medication (Sodium/)        5/29/2024    11:49 AM   Additional Questions   Roomed by Ariadne SOTO     Video Start Time: 1:03 PM    HPI     He scheduled a virtual appointment to discuss low sodium levels he has had consistently over the past few years.  He reports that this was never an issue for him until he started lisinopril.  He is currently on 40 mg daily and reports that his blood pressure has been under good  control.  The systolic blood pressure is consistently in the 110s-120s.  He is not complaining of headaches or blurry vision.  He follows closely with his infectious disease specialist for HIV treatment.  They are monitoring his labs closely.  On 5/22/2024 his sodium was 129, chloride 94, potassium 4.6.  He reports trying hydrochlorothiazide in the past, but this did not work well for his blood pressure.            Review of Systems  Constitutional, HEENT, cardiovascular, pulmonary, GI, , musculoskeletal, neuro, skin, endocrine and psych systems are negative, except as otherwise noted.      Objective           Vitals:  No vitals were obtained today due to virtual visit.    Physical Exam   GENERAL: alert and no distress  EYES: Eyes grossly normal to inspection.  No discharge or erythema, or obvious scleral/conjunctival abnormalities.  RESP: No audible wheeze, cough, or visible cyanosis.    SKIN: Visible skin clear. No significant rash, abnormal pigmentation or lesions.  NEURO: Cranial nerves grossly intact.  Mentation and speech appropriate for age.  PSYCH: Appropriate affect, tone, and pace of words          Video-Visit Details    Type of service:  Video Visit   Video End Time:1:53 PM  Originating Location (pt. Location): Home    Distant Location (provider location):  On-site  Platform used for Video Visit: Bakari  Signed Electronically by: Alex Manzo DO

## 2024-08-07 ENCOUNTER — LAB (OUTPATIENT)
Dept: LAB | Facility: CLINIC | Age: 74
End: 2024-08-07
Payer: COMMERCIAL

## 2024-08-07 DIAGNOSIS — I10 ESSENTIAL HYPERTENSION: ICD-10-CM

## 2024-08-07 DIAGNOSIS — E87.1 HYPONATREMIA: ICD-10-CM

## 2024-08-07 LAB
ANION GAP SERPL CALCULATED.3IONS-SCNC: 10 MMOL/L (ref 7–15)
BUN SERPL-MCNC: 12.4 MG/DL (ref 8–23)
CALCIUM SERPL-MCNC: 9 MG/DL (ref 8.8–10.4)
CHLORIDE SERPL-SCNC: 96 MMOL/L (ref 98–107)
CREAT SERPL-MCNC: 0.96 MG/DL (ref 0.67–1.17)
EGFRCR SERPLBLD CKD-EPI 2021: 83 ML/MIN/1.73M2
GLUCOSE SERPL-MCNC: 83 MG/DL (ref 70–99)
HCO3 SERPL-SCNC: 25 MMOL/L (ref 22–29)
POTASSIUM SERPL-SCNC: 4.6 MMOL/L (ref 3.4–5.3)
SODIUM SERPL-SCNC: 131 MMOL/L (ref 135–145)

## 2024-08-07 PROCEDURE — 36415 COLL VENOUS BLD VENIPUNCTURE: CPT

## 2024-08-07 PROCEDURE — 80048 BASIC METABOLIC PNL TOTAL CA: CPT

## 2024-08-08 DIAGNOSIS — I10 ESSENTIAL HYPERTENSION: ICD-10-CM

## 2024-08-08 RX ORDER — AMLODIPINE BESYLATE 5 MG/1
5 TABLET ORAL DAILY
Qty: 90 TABLET | Refills: 2 | Status: SHIPPED | OUTPATIENT
Start: 2024-08-08

## 2024-08-08 RX ORDER — LISINOPRIL 20 MG/1
20 TABLET ORAL DAILY
Qty: 90 TABLET | Refills: 2 | Status: SHIPPED | OUTPATIENT
Start: 2024-08-08

## 2024-08-20 ENCOUNTER — LAB (OUTPATIENT)
Dept: LAB | Facility: CLINIC | Age: 74
End: 2024-08-20
Payer: COMMERCIAL

## 2024-08-20 DIAGNOSIS — Z21 HUMAN IMMUNODEFICIENCY VIRUS I INFECTION (H): ICD-10-CM

## 2024-08-20 PROCEDURE — 87536 HIV-1 QUANT&REVRSE TRNSCRPJ: CPT | Performed by: STUDENT IN AN ORGANIZED HEALTH CARE EDUCATION/TRAINING PROGRAM

## 2024-08-20 PROCEDURE — 99000 SPECIMEN HANDLING OFFICE-LAB: CPT | Performed by: PATHOLOGY

## 2024-08-20 PROCEDURE — 36415 COLL VENOUS BLD VENIPUNCTURE: CPT | Performed by: PATHOLOGY

## 2024-08-22 LAB — HIV1 RNA # PLAS NAA DL=20: NOT DETECTED COPIES/ML

## 2024-10-01 ENCOUNTER — TELEPHONE (OUTPATIENT)
Dept: SURGERY | Facility: CLINIC | Age: 74
End: 2024-10-01
Payer: COMMERCIAL

## 2024-10-01 NOTE — TELEPHONE ENCOUNTER
Patient confirmed scheduled appointment:  Date: 12/06/2024  Time: 1100 am   Visit type: microscopy   Provider: Rosangela Kaur NP   Location: [facility]   Testing/imaging: n/a  Additional notes: n/a

## 2024-10-30 ENCOUNTER — PATIENT OUTREACH (OUTPATIENT)
Dept: CARE COORDINATION | Facility: CLINIC | Age: 74
End: 2024-10-30
Payer: COMMERCIAL

## 2024-11-05 ENCOUNTER — LAB (OUTPATIENT)
Dept: LAB | Facility: CLINIC | Age: 74
End: 2024-11-05
Payer: COMMERCIAL

## 2024-11-05 DIAGNOSIS — Z21 ASYMPTOMATIC HIV INFECTION, WITH NO HISTORY OF HIV-RELATED ILLNESS (H): ICD-10-CM

## 2024-11-05 DIAGNOSIS — D72.829 LEUKOCYTOSIS, UNSPECIFIED TYPE: ICD-10-CM

## 2024-11-05 LAB
ALBUMIN SERPL BCG-MCNC: 4.2 G/DL (ref 3.5–5.2)
ALP SERPL-CCNC: 78 U/L (ref 40–150)
ALT SERPL W P-5'-P-CCNC: 16 U/L (ref 0–70)
ANION GAP SERPL CALCULATED.3IONS-SCNC: 11 MMOL/L (ref 7–15)
AST SERPL W P-5'-P-CCNC: 28 U/L (ref 0–45)
BASOPHILS # BLD AUTO: 0 10E3/UL (ref 0–0.2)
BASOPHILS NFR BLD AUTO: 0 %
BILIRUB SERPL-MCNC: 0.8 MG/DL
BUN SERPL-MCNC: 12.1 MG/DL (ref 8–23)
CALCIUM SERPL-MCNC: 9 MG/DL (ref 8.8–10.4)
CHLORIDE SERPL-SCNC: 98 MMOL/L (ref 98–107)
CREAT SERPL-MCNC: 0.92 MG/DL (ref 0.67–1.17)
EGFRCR SERPLBLD CKD-EPI 2021: 87 ML/MIN/1.73M2
EOSINOPHIL # BLD AUTO: 0.2 10E3/UL (ref 0–0.7)
EOSINOPHIL NFR BLD AUTO: 2 %
ERYTHROCYTE [DISTWIDTH] IN BLOOD BY AUTOMATED COUNT: 12 % (ref 10–15)
GLUCOSE SERPL-MCNC: 91 MG/DL (ref 70–99)
HCO3 SERPL-SCNC: 24 MMOL/L (ref 22–29)
HCT VFR BLD AUTO: 45.5 % (ref 40–53)
HGB BLD-MCNC: 15.9 G/DL (ref 13.3–17.7)
IMM GRANULOCYTES # BLD: 0 10E3/UL
IMM GRANULOCYTES NFR BLD: 0 %
LYMPHOCYTES # BLD AUTO: 2.7 10E3/UL (ref 0.8–5.3)
LYMPHOCYTES NFR BLD AUTO: 29 %
MCH RBC QN AUTO: 31.4 PG (ref 26.5–33)
MCHC RBC AUTO-ENTMCNC: 34.9 G/DL (ref 31.5–36.5)
MCV RBC AUTO: 90 FL (ref 78–100)
MONOCYTES # BLD AUTO: 0.8 10E3/UL (ref 0–1.3)
MONOCYTES NFR BLD AUTO: 9 %
NEUTROPHILS # BLD AUTO: 5.7 10E3/UL (ref 1.6–8.3)
NEUTROPHILS NFR BLD AUTO: 61 %
PLATELET # BLD AUTO: 284 10E3/UL (ref 150–450)
POTASSIUM SERPL-SCNC: 4.2 MMOL/L (ref 3.4–5.3)
PROT SERPL-MCNC: 6.8 G/DL (ref 6.4–8.3)
RBC # BLD AUTO: 5.06 10E6/UL (ref 4.4–5.9)
SODIUM SERPL-SCNC: 133 MMOL/L (ref 135–145)
WBC # BLD AUTO: 9.4 10E3/UL (ref 4–11)

## 2024-11-05 PROCEDURE — 85025 COMPLETE CBC W/AUTO DIFF WBC: CPT

## 2024-11-05 PROCEDURE — 86359 T CELLS TOTAL COUNT: CPT

## 2024-11-05 PROCEDURE — 86360 T CELL ABSOLUTE COUNT/RATIO: CPT

## 2024-11-05 PROCEDURE — 87536 HIV-1 QUANT&REVRSE TRNSCRPJ: CPT

## 2024-11-05 PROCEDURE — 80053 COMPREHEN METABOLIC PANEL: CPT

## 2024-11-05 PROCEDURE — 36415 COLL VENOUS BLD VENIPUNCTURE: CPT

## 2024-11-06 LAB
CD3 CELLS # BLD: 2457 CELLS/UL (ref 603–2990)
CD3 CELLS NFR BLD: 81 % (ref 49–84)
CD3+CD4+ CELLS # BLD: 1017 CELLS/UL (ref 441–2156)
CD3+CD4+ CELLS NFR BLD: 34 % (ref 28–63)
CD3+CD4+ CELLS/CD3+CD8+ CLL BLD: 0.73 % (ref 1.4–2.6)
CD3+CD8+ CELLS # BLD: 1393 CELLS/UL (ref 125–1312)
CD3+CD8+ CELLS NFR BLD: 46 % (ref 10–40)
T CELL COMMENT: ABNORMAL

## 2024-11-07 LAB
HIV1 RNA # PLAS NAA DL=20: 574 COPIES/ML
HIV1 RNA SERPL NAA+PROBE-LOG#: 2.8 {LOG_COPIES}/ML

## 2024-11-08 DIAGNOSIS — Z21 HUMAN IMMUNODEFICIENCY VIRUS I INFECTION (H): Primary | ICD-10-CM

## 2024-11-20 ENCOUNTER — LAB (OUTPATIENT)
Dept: LAB | Facility: CLINIC | Age: 74
End: 2024-11-20
Payer: COMMERCIAL

## 2024-11-20 ENCOUNTER — OFFICE VISIT (OUTPATIENT)
Dept: INFECTIOUS DISEASES | Facility: CLINIC | Age: 74
End: 2024-11-20
Attending: STUDENT IN AN ORGANIZED HEALTH CARE EDUCATION/TRAINING PROGRAM
Payer: COMMERCIAL

## 2024-11-20 VITALS
WEIGHT: 206 LBS | HEART RATE: 75 BPM | SYSTOLIC BLOOD PRESSURE: 148 MMHG | BODY MASS INDEX: 29.56 KG/M2 | OXYGEN SATURATION: 97 % | DIASTOLIC BLOOD PRESSURE: 80 MMHG

## 2024-11-20 DIAGNOSIS — Z21 HUMAN IMMUNODEFICIENCY VIRUS I INFECTION (H): ICD-10-CM

## 2024-11-20 DIAGNOSIS — Z21 HUMAN IMMUNODEFICIENCY VIRUS I INFECTION (H): Primary | ICD-10-CM

## 2024-11-20 PROCEDURE — 99000 SPECIMEN HANDLING OFFICE-LAB: CPT | Performed by: PATHOLOGY

## 2024-11-20 PROCEDURE — 36415 COLL VENOUS BLD VENIPUNCTURE: CPT | Performed by: PATHOLOGY

## 2024-11-20 PROCEDURE — G0463 HOSPITAL OUTPT CLINIC VISIT: HCPCS | Performed by: STUDENT IN AN ORGANIZED HEALTH CARE EDUCATION/TRAINING PROGRAM

## 2024-11-20 PROCEDURE — 87906 NFCT AGT GNTYP ALYS HIV1: CPT | Mod: 90 | Performed by: PATHOLOGY

## 2024-11-20 PROCEDURE — 87901 NFCT AGT GNTYP ALYS HIV1 REV: CPT | Mod: 90 | Performed by: PATHOLOGY

## 2024-11-20 PROCEDURE — 87903 PHENOTYPE DNA HIV W/CULTURE: CPT | Mod: 90 | Performed by: PATHOLOGY

## 2024-11-20 PROCEDURE — 87904 PHENOTYPE DNA HIV W/CLT ADD: CPT | Mod: 90 | Performed by: PATHOLOGY

## 2024-11-20 RX ORDER — ELVITEGRAVIR, COBICISTAT, EMTRICITABINE, AND TENOFOVIR DISOPROXIL FUMARATE 150; 150; 200; 300 MG/1; MG/1; MG/1; MG/1
1 TABLET, FILM COATED ORAL
Qty: 30 TABLET | Refills: 5 | Status: SHIPPED | OUTPATIENT
Start: 2024-11-20

## 2024-11-20 ASSESSMENT — PAIN SCALES - GENERAL: PAINLEVEL_OUTOF10: NO PAIN (0)

## 2024-11-20 NOTE — LETTER
11/20/2024       RE: Zac Rosenthal  [address]  [facility] [address]     Dear Colleague,    Thank you for referring your patient, Zac Rosenthal, to the [facility] INFECTI[facility] DISEASE CLINIC [place] at [facility]. Please see a copy of my visit note below.    Infectious Disease Clinic Follow Up Visit    HPI:  Zac Rosenthal is a 74 year old male who is here for follow-up on HIV treatment.  He was previously followed by Dr Victor Hugo Dacosta, I took over care once Dr Dacosta retired from clinical duties.     He is currently taking Stribild once a day without missing doses and no side effects.     Had a high resolution anoscopy in May 2018, August 2018, and Feb 2019.  The cytology from Feb 2019 showed no abnormality.   At follow up with CRS in 9/2021: No evidence of high grade dysplasia. 8/22, LGSIL(AIN1), 3/23 LGSIL 9/23 HGSIL -planned CRS follow up    Also had a colonoscopy which showed a tubular adenoma in April 2017.Colonoscopy recommended after 5 years from then, done 7/21/22 with two tubular adenomas.    He has an established primary care with family medicine, Dr. Francis Cordon, who has him on lisinpril for hypertension. New amlodipine.     Not sexually active, decline STI testing today    Blips have been recent concern. Additional discussion as below      Medications:  Current Outpatient Medications   Medication Sig Dispense Refill     amLODIPine (NORVASC) 5 MG tablet Take 1 tablet (5 mg) by mouth daily 90 tablet 2     fluconazole (DIFLUCAN) 200 MG tablet Take 1 tablet by mouth daily. 90 tablet 3     ketoconazole (NIZORAL) 2 % external cream Apply topically daily 15 g 11     lisinopril (ZESTRIL) 20 MG tablet Take 1 tablet (20 mg) by mouth daily 90 tablet 2     STRIBILD [id number] mg tab Take 1 tablet by mouth daily with food 30 tablet 11       Exam:  There were no vitals taken for this visit.    Physical Exam   Constitutional: He is  well-developed, well-nourished, and in no distress. No difficulty breathing or speaking.  No cough  Neurological: He is alert. No gross FNDs  Psychiatric: Affect and judgment normal.   RS: CTAB  CVS: S1 S2 normal, RRR, No RMG  Skin: No obvious rashes    T Cell Subset:  Absolute CD4   Date Value Ref Range Status   05/18/2021 1,176 441 - 2,156 cells/uL Final     Absolute CD4, [place] T Cells   Date Value Ref Range Status   11/05/2024 1,017 441 - 2,156 cells/uL Final     CD4 [place] T   Date Value Ref Range Status   05/18/2021 37 28 - 63 % Final     CD4% [place] T Cells   Date Value Ref Range Status   11/05/2024 34 28 - 63 % Final     CD8 Suppressor T   Date Value Ref Range Status   05/18/2021 39 10 - 40 % Final     CD8% Suppressor T Cells   Date Value Ref Range Status   11/05/2024 46 (H) 10 - 40 % Final     CD3 Mature T   Date Value Ref Range Status   05/18/2021 77 49 - 84 % Final     CD3% Total T Cells   Date Value Ref Range Status   11/05/2024 81 49 - 84 % Final     CD4:CD8 Ratio   Date Value Ref Range Status   11/05/2024 0.73 (L) 1.40 - 2.60 Final   05/18/2021 0.95 (L) 1.40 - 2.60 Final     WBC   Date Value Ref Range Status   05/18/2021 7.1 4.0 - 11.0 10e9/L Final     WBC Count   Date Value Ref Range Status   11/05/2024 9.4 4.0 - 11.0 10e3/uL Final     % Lymphocytes   Date Value Ref Range Status   11/05/2024 29 % Final   05/18/2021 42.7 % Final     Absolute CD3   Date Value Ref Range Status   05/18/2021 2,483 603 - 2,990 cells/uL Final     Absolute CD3, Total T Cells   Date Value Ref Range Status   11/05/2024 2,457 603 - 2,990 cells/uL Final     Absolute CD8   Date Value Ref Range Status   05/18/2021 1,267 125 - 1,312 cells/uL Final     Absolute CD8, Suppressor T Cells   Date Value Ref Range Status   11/05/2024 1,393 (H) 125 - 1,312 cells/uL Final       HIV-1 RNA Quantitative:  HIV-1 RNA Quant Result   Date Value Ref Range Status   05/18/2021 21 (A) HIVND^HIV-1 RNA Not Detected [Copies]/mL Final     Comment:     The  ANAT AmpliPrep/ANAT TaqMan HIV-1 test is an FDA-approved in vitro   nucleic acid amplification test for the quantitation of HIV-1 RNA in human   plasma (EDTA plasma) using the ANAT AmpliPrep instrument for automated viral   nucleic acid extraction and the ANAT TaqMan Analyzer or ANAT TaqMan for   automated Real Time PCR amplification and detection of the viral nucleic acid   target.  Titer results are reported in copies/ml. This assay is intended for use in   conjunction with clinical presentation and other laboratory markers of disease   prognosis and for use as an aid in assessing viral response to antiretroviral   treatment as measured by changes in plasma HIV-1 RNA levels. This test should   not be used as a donor screening test to confirm the presence of HIV-1   infection.       HIV-1 RNA copies/mL   Date Value Ref Range Status   08/20/2024 Not Detected Not Detected Copies/mL Final   04/19/2023 Not Detected Not Detected Copies/mL Final     HIV-1 RNA Copies/mL, Instrument   Date Value Ref Range Status   11/05/2024 574 (H) Not Detected copies/mL Final   04/12/2022 38 (H) <1 copies/mL Final     HIV RNA QT Log   Date Value Ref Range Status   05/18/2021 1.3 (H) <1.3 [Log_copies]/mL Final     HIV-1 log   Date Value Ref Range Status   11/05/2024 2.8  Final   04/12/2022 1.6  Final       Lab Results   Component Value Date    WBC 7.1 08/24/2021    WBC 7.1 05/18/2021     Lab Results   Component Value Date    RBC 5.26 08/24/2021    RBC 5.14 05/18/2021     Lab Results   Component Value Date    HGB 17.1 08/24/2021    HGB 16.4 05/18/2021     Lab Results   Component Value Date    HCT 50.0 08/24/2021    HCT 48.3 05/18/2021     No components found for: MCT  Lab Results   Component Value Date    MCV 95 08/24/2021    MCV 94 05/18/2021     Lab Results   Component Value Date    MCH 32.5 08/24/2021    MCH 31.9 05/18/2021     Lab Results   Component Value Date    MCHC 34.2 08/24/2021    MCHC 34.0 05/18/2021     Lab Results    Component Value Date    RDW 12.4 08/24/2021    RDW 12.8 05/18/2021     Lab Results   Component Value Date     08/24/2021     05/18/2021       Last Comprehensive Metabolic Panel:  Sodium   Date Value Ref Range Status   11/05/2024 133 (L) 135 - 145 mmol/L Final   05/18/2021 133 133 - 144 mmol/L Final     Potassium   Date Value Ref Range Status   11/05/2024 4.2 3.4 - 5.3 mmol/L Final   10/04/2022 4.3 3.4 - 5.3 mmol/L Final   05/18/2021 4.4 3.4 - 5.3 mmol/L Final     Chloride   Date Value Ref Range Status   11/05/2024 98 98 - 107 mmol/L Final   10/04/2022 101 94 - 109 mmol/L Final   05/18/2021 100 94 - 109 mmol/L Final     Carbon Dioxide   Date Value Ref Range Status   05/18/2021 27 20 - 32 mmol/L Final     Carbon Dioxide (CO2)   Date Value Ref Range Status   11/05/2024 24 22 - 29 mmol/L Final   10/04/2022 28 20 - 32 mmol/L Final     Anion Gap   Date Value Ref Range Status   11/05/2024 11 7 - 15 mmol/L Final   10/04/2022 5 3 - 14 mmol/L Final   05/18/2021 6 3 - 14 mmol/L Final     Glucose   Date Value Ref Range Status   11/05/2024 91 70 - 99 mg/dL Final   10/04/2022 89 70 - 99 mg/dL Final   05/18/2021 89 70 - 99 mg/dL Final     Urea Nitrogen   Date Value Ref Range Status   11/05/2024 12.1 8.0 - 23.0 mg/dL Final   10/04/2022 13 7 - 30 mg/dL Final   05/18/2021 12 7 - 30 mg/dL Final     Creatinine   Date Value Ref Range Status   11/05/2024 0.92 0.67 - 1.17 mg/dL Final   05/18/2021 0.88 0.66 - 1.25 mg/dL Final     GFR Estimate   Date Value Ref Range Status   11/05/2024 87 >60 mL/min/1.73m2 Final     Comment:     eGFR calculated using 2021 CKD-EPI equation.   05/18/2021 86 >60 mL/min/[1.73_m2] Final     Comment:     Non  GFR Calc  Starting 12/18/2018, serum creatinine based estimated GFR (eGFR) will be   calculated using the Chronic Kidney Disease Epidemiology Collaboration   (CKD-EPI) equation.       Calcium   Date Value Ref Range Status   11/05/2024 9.0 8.8 - 10.4 mg/dL Final     Comment:      Reference intervals for this test were updated on 7/16/2024 to reflect our healthy population more accurately. There may be differences in the flagging of prior results with similar values performed with this method. Those prior results can be interpreted in the context of the updated reference intervals.   05/18/2021 9.3 8.5 - 10.1 mg/dL Final     Bilirubin Total   Date Value Ref Range Status   11/05/2024 0.8 <=1.2 mg/dL Final   05/18/2021 0.6 0.2 - 1.3 mg/dL Final     Alkaline Phosphatase   Date Value Ref Range Status   11/05/2024 78 40 - 150 U/L Final   05/18/2021 88 40 - 150 U/L Final     ALT   Date Value Ref Range Status   11/05/2024 16 0 - 70 U/L Final   05/18/2021 20 0 - 70 U/L Final     AST   Date Value Ref Range Status   11/05/2024 28 0 - 45 U/L Final   05/18/2021 21 0 - 45 U/L Final       Assessment and Plan:  Zac Rosenthal is a 74 year old male who is here for followup on HIV treatment.    #HIV: Was well controlled on Stribild. No missed doses. Had some blips in viral load in 2/2021 to 188, since then undetectable in May 2021, 8/2021,10/22, 10/23. CD4 1393 in 10/23. In 5/2-024 had another Viral load with a Blip to 89, still considered undetectable (<200), planned to monitor every 3 months. Labs 11/5/2024 at 574. CD4 reassuringly 1017 Ordered phenosense add on at the time but appears to not be done.   Today we predominantly discussed this. He is 100% compliant, uses a pill box. New med - amlodipine - stribild increases its level but no effect on Stribild levels on my review. Will create an MTM referral. No new food/multivitamins. Will check resistance, repeat in our lab (some reported lab issues lately - he got at [facility] lab) 4 weeks from last. Will closely follow up. Will consider proviral DNA as next step is remains an issue. Not sexually active, discussed no longer undetectable and advised condom use w sexual activity.       #Leucocytosis.10/23 labs with elevated WBC. No localizing symptoms on ROS.  Subsequent checks normal    #HTN: The patient follows up with his PCP to address his elevated BP. On lisinopril and new amlodipine. Home Bps have been always <130, DBP 60s. 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 Subsequent follow-up again in Feb 2019 showed no dysplasia.  At follow up with CRS in 9/2021: No evidence of high grade dysplasia. 8/22, LGSIL(AIN1), 3/23 LGSIL 9/23 HGSIL - CRS seen since, no new path visisble to me, they are following    #Colonic tubular adenoma polyp in April 2017.  Colonoscopy recommended after 5 years from then. Repeat colonoscopy w 2 tubular adenomas in 7/22. Follow up per GI    #Low sodium, resolved.  On review, remained a bit low and stable since at least 2018, determination was he likely just has a chronically low level,plan to watch. Had diarrhea now improved after adjustign diet, NA now normal. 129 in 5.2024, advised primary follow up, 131 and 133 snce.       Preventative Medicine  Cardiovascular Fidel:                        Lipids: normal in 4/23, primary pans to follow per pt                       Blood Pressure: 148/80, as above  Cancer Screening:                       Colon: As above, follow up per gi                       Anal: Planned repeat eval in 6 months  Immunizations:                       Hepatitis A: 1/21/2000, 6/25/1999                       Hepatitis B: 7/13/2012, 1/21/2000, 7/28/1999, 6/25/1999                        Influenza: Done 2024                       Pneumovax/Prevnar PPSV 23 11/20/2014, 2/6/2009, 1/21/2000                       PCV 13 8/18/2017, 7/24/2013                          - discussed PCV20 but based on shared decision making decided against, immunizations can be considered complete for pneumococcus                       Tdap: 2008, 8/2017                       Td 1998                       Meningococcus: 9/21/2015, 7/23/2015                        Covid: 3 doses done, boostee done 2024  Bone Health: No screening indicated at this time  Renal Health: Cr: normal  Sexually Transmitted Infection Risk and Screening:                       Gonorrhea and Chlamydia: declined today                       Syphilis: declined today      Recs  - Labs today - phenosense Gt plus integrase re-ordered  - Labs early December - HIV Viral load ([facility] here)  - Follow up in 2 months with me  - MTM pharmacist referral created - Selene will reach out  - Continue Stribild for now  - Advised to use protection with any sexual activity since no longer undetectable      Total time on day of visit including chart review, visit, counseling, documentation and coordination of care:  49 minutes                Carlos Emerson   of Medicine  Division of Infectious Diseases      Again, thank you for allowing me to participate in the care of your patient.      Sincerely,    Carlos Emerson MD

## 2024-11-20 NOTE — PATIENT INSTRUCTIONS
- Labs today - resistance  - Labs early December - HIV Viral load ([facility] [place])  - Follow up in 2 months with me  - MTM pharmacist referral created - Selene will reach out  - Continue Stribild for now  - Advise to use protection with any sexual activity since no longer undetectable

## 2024-11-20 NOTE — NURSING NOTE
Chief Complaint   Patient presents with    RECHECK     B20, 1 year follow up      BP (!) 148/80 (BP Location: Right arm, Cuff Size: Adult Regular)   Pulse 75   Wt 93.4 kg (206 lb)   SpO2 97%   BMI 29.56 kg/m    Lorene Garcia, CMA on 11/20/2024 at 2:00 PM

## 2024-11-20 NOTE — PROGRESS NOTES
Infectious Disease Clinic Follow Up Visit    HPI:  Zac Rosenthal is a 74 year old male who is here for follow-up on HIV treatment.  He was previously followed by Dr Victor Hugo Dacosta, I took over care once Dr Dacosta retired from clinical duties.     He is currently taking Stribild once a day without missing doses and no side effects.     Had a high resolution anoscopy in May 2018, August 2018, and Feb 2019.  The cytology from Feb 2019 showed no abnormality.   At follow up with CRS in 9/2021: No evidence of high grade dysplasia. 8/22, LGSIL(AIN1), 3/23 LGSIL 9/23 HGSIL -planned CRS follow up    Also had a colonoscopy which showed a tubular adenoma in April 2017.Colonoscopy recommended after 5 years from then, done 7/21/22 with two tubular adenomas.    He has an established primary care with family medicine, Dr. Francis Cordon, who has him on lisinpril for hypertension. New amlodipine.     Not sexually active, decline STI testing today    Blips have been recent concern. Additional discussion as below      Medications:  Current Outpatient Medications   Medication Sig Dispense Refill    amLODIPine (NORVASC) 5 MG tablet Take 1 tablet (5 mg) by mouth daily 90 tablet 2    fluconazole (DIFLUCAN) 200 MG tablet Take 1 tablet by mouth daily. 90 tablet 3    ketoconazole (NIZORAL) 2 % external cream Apply topically daily 15 g 11    lisinopril (ZESTRIL) 20 MG tablet Take 1 tablet (20 mg) by mouth daily 90 tablet 2    STRIBILD [phone] mg tab Take 1 tablet by mouth daily with food 30 tablet 11       Exam:  There were no vitals taken for this visit.    Physical Exam   Constitutional: He is well-developed, well-nourished, and in no distress. No difficulty breathing or speaking.  No cough  Neurological: He is alert. No gross FNDs  Psychiatric: Affect and judgment normal.   RS: CTAB  CVS: S1 S2 normal, RRR, No RMG  Skin: No obvious rashes    T Cell Subset:  Absolute CD4   Date Value Ref Range Status   05/18/2021 1,176 441 -  2,156 cells/uL Final     Absolute CD4, [place] T Cells   Date Value Ref Range Status   11/05/2024 1,017 441 - 2,156 cells/uL Final     CD4 [place] T   Date Value Ref Range Status   05/18/2021 37 28 - 63 % Final     CD4% [place] T Cells   Date Value Ref Range Status   11/05/2024 34 28 - 63 % Final     CD8 Suppressor T   Date Value Ref Range Status   05/18/2021 39 10 - 40 % Final     CD8% Suppressor T Cells   Date Value Ref Range Status   11/05/2024 46 (H) 10 - 40 % Final     CD3 Mature T   Date Value Ref Range Status   05/18/2021 77 49 - 84 % Final     CD3% Total T Cells   Date Value Ref Range Status   11/05/2024 81 49 - 84 % Final     CD4:CD8 Ratio   Date Value Ref Range Status   11/05/2024 0.73 (L) 1.40 - 2.60 Final   05/18/2021 0.95 (L) 1.40 - 2.60 Final     WBC   Date Value Ref Range Status   05/18/2021 7.1 4.0 - 11.0 10e9/L Final     WBC Count   Date Value Ref Range Status   11/05/2024 9.4 4.0 - 11.0 10e3/uL Final     % Lymphocytes   Date Value Ref Range Status   11/05/2024 29 % Final   05/18/2021 42.7 % Final     Absolute CD3   Date Value Ref Range Status   05/18/2021 2,483 603 - 2,990 cells/uL Final     Absolute CD3, Total T Cells   Date Value Ref Range Status   11/05/2024 2,457 603 - 2,990 cells/uL Final     Absolute CD8   Date Value Ref Range Status   05/18/2021 1,267 125 - 1,312 cells/uL Final     Absolute CD8, Suppressor T Cells   Date Value Ref Range Status   11/05/2024 1,393 (H) 125 - 1,312 cells/uL Final       HIV-1 RNA Quantitative:  HIV-1 RNA Quant Result   Date Value Ref Range Status   05/18/2021 21 (A) HIVND^HIV-1 RNA Not Detected [Copies]/mL Final     Comment:     The ANAT AmpliPrep/ANAT TaqMan HIV-1 test is an FDA-approved in vitro   nucleic acid amplification test for the quantitation of HIV-1 RNA in human   plasma (EDTA plasma) using the ANAT AmpliPrep instrument for automated viral   nucleic acid extraction and the ANAT TaqMan Analyzer or ANAT TaqMan for   automated Real Time PCR  amplification and detection of the viral nucleic acid   target.  Titer results are reported in copies/ml. This assay is intended for use in   conjunction with clinical presentation and other laboratory markers of disease   prognosis and for use as an aid in assessing viral response to antiretroviral   treatment as measured by changes in plasma HIV-1 RNA levels. This test should   not be used as a donor screening test to confirm the presence of HIV-1   infection.       HIV-1 RNA copies/mL   Date Value Ref Range Status   08/20/2024 Not Detected Not Detected Copies/mL Final   04/19/2023 Not Detected Not Detected Copies/mL Final     HIV-1 RNA Copies/mL, Instrument   Date Value Ref Range Status   11/05/2024 574 (H) Not Detected copies/mL Final   04/12/2022 38 (H) <1 copies/mL Final     HIV RNA QT Log   Date Value Ref Range Status   05/18/2021 1.3 (H) <1.3 [Log_copies]/mL Final     HIV-1 log   Date Value Ref Range Status   11/05/2024 2.8  Final   04/12/2022 1.6  Final       Lab Results   Component Value Date    WBC 7.1 08/24/2021    WBC 7.1 05/18/2021     Lab Results   Component Value Date    RBC 5.26 08/24/2021    RBC 5.14 05/18/2021     Lab Results   Component Value Date    HGB 17.1 08/24/2021    HGB 16.4 05/18/2021     Lab Results   Component Value Date    HCT 50.0 08/24/2021    HCT 48.3 05/18/2021     No components found for: MCT  Lab Results   Component Value Date    MCV 95 08/24/2021    MCV 94 05/18/2021     Lab Results   Component Value Date    MCH 32.5 08/24/2021    MCH 31.9 05/18/2021     Lab Results   Component Value Date    MCHC 34.2 08/24/2021    MCHC 34.0 05/18/2021     Lab Results   Component Value Date    RDW 12.4 08/24/2021    RDW 12.8 05/18/2021     Lab Results   Component Value Date     08/24/2021     05/18/2021       Last Comprehensive Metabolic Panel:  Sodium   Date Value Ref Range Status   11/05/2024 133 (L) 135 - 145 mmol/L Final   05/18/2021 133 133 - 144 mmol/L Final     Potassium    Date Value Ref Range Status   11/05/2024 4.2 3.4 - 5.3 mmol/L Final   10/04/2022 4.3 3.4 - 5.3 mmol/L Final   05/18/2021 4.4 3.4 - 5.3 mmol/L Final     Chloride   Date Value Ref Range Status   11/05/2024 98 98 - 107 mmol/L Final   10/04/2022 101 94 - 109 mmol/L Final   05/18/2021 100 94 - 109 mmol/L Final     Carbon Dioxide   Date Value Ref Range Status   05/18/2021 27 20 - 32 mmol/L Final     Carbon Dioxide (CO2)   Date Value Ref Range Status   11/05/2024 24 22 - 29 mmol/L Final   10/04/2022 28 20 - 32 mmol/L Final     Anion Gap   Date Value Ref Range Status   11/05/2024 11 7 - 15 mmol/L Final   10/04/2022 5 3 - 14 mmol/L Final   05/18/2021 6 3 - 14 mmol/L Final     Glucose   Date Value Ref Range Status   11/05/2024 91 70 - 99 mg/dL Final   10/04/2022 89 70 - 99 mg/dL Final   05/18/2021 89 70 - 99 mg/dL Final     Urea Nitrogen   Date Value Ref Range Status   11/05/2024 12.1 8.0 - 23.0 mg/dL Final   10/04/2022 13 7 - 30 mg/dL Final   05/18/2021 12 7 - 30 mg/dL Final     Creatinine   Date Value Ref Range Status   11/05/2024 0.92 0.67 - 1.17 mg/dL Final   05/18/2021 0.88 0.66 - 1.25 mg/dL Final     GFR Estimate   Date Value Ref Range Status   11/05/2024 87 >60 mL/min/1.73m2 Final     Comment:     eGFR calculated using 2021 CKD-EPI equation.   05/18/2021 86 >60 mL/min/[1.73_m2] Final     Comment:     Non  GFR Calc  Starting 12/18/2018, serum creatinine based estimated GFR (eGFR) will be   calculated using the Chronic Kidney Disease Epidemiology Collaboration   (CKD-EPI) equation.       Calcium   Date Value Ref Range Status   11/05/2024 9.0 8.8 - 10.4 mg/dL Final     Comment:     Reference intervals for this test were updated on 7/16/2024 to reflect our healthy population more accurately. There may be differences in the flagging of prior results with similar values performed with this method. Those prior results can be interpreted in the context of the updated reference intervals.   05/18/2021 9.3 8.5  - 10.1 mg/dL Final     Bilirubin Total   Date Value Ref Range Status   11/05/2024 0.8 <=1.2 mg/dL Final   05/18/2021 0.6 0.2 - 1.3 mg/dL Final     Alkaline Phosphatase   Date Value Ref Range Status   11/05/2024 78 40 - 150 U/L Final   05/18/2021 88 40 - 150 U/L Final     ALT   Date Value Ref Range Status   11/05/2024 16 0 - 70 U/L Final   05/18/2021 20 0 - 70 U/L Final     AST   Date Value Ref Range Status   11/05/2024 28 0 - 45 U/L Final   05/18/2021 21 0 - 45 U/L Final       Assessment and Plan:  Zac Rosenthal is a 74 year old male who is here for followup on HIV treatment.    #HIV: Was well controlled on Stribild. No missed doses. Had some blips in viral load in 2/2021 to 188, since then undetectable in May 2021, 8/2021,10/22, 10/23. CD4 1393 in 10/23. In 5/2-024 had another Viral load with a Blip to 89, still considered undetectable (<200), planned to monitor every 3 months. Labs 11/5/2024 at 574. CD4 reassuringly 1017 Ordered phenosense add on at the time but appears to not be done.   Today we predominantly discussed this. He is 100% compliant, uses a pill box. New med - amlodipine - stribild increases its level but no effect on Stribild levels on my review. Will create an MTM referral. No new food/multivitamins. Will check resistance, repeat in our lab (some reported lab issues lately - he got at [facility] lab) 4 weeks from last. Will closely follow up. Will consider proviral DNA as next step is remains an issue. Not sexually active, discussed no longer undetectable and advised condom use w sexual activity.       #Leucocytosis.10/23 labs with elevated WBC. No localizing symptoms on ROS. Subsequent checks normal    #HTN: The patient follows up with his PCP to address his elevated BP. On lisinopril and new amlodipine. Home Bps have been always <130, DBP 60s. 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 Subsequent follow-up again in Feb 2019 showed no dysplasia.  At follow up with CRS in 9/2021: No evidence of high grade dysplasia. 8/22, LGSIL(AIN1), 3/23 LGSIL 9/23 HGSIL - CRS seen since, no new path visisble to me, they are following    #Colonic tubular adenoma polyp in April 2017.  Colonoscopy recommended after 5 years from then. Repeat colonoscopy w 2 tubular adenomas in 7/22. Follow up per GI    #Low sodium, resolved.  On review, remained a bit low and stable since at least 2018, determination was he likely just has a chronically low level,plan to watch. Had diarrhea now improved after adjustign diet, NA now normal. 129 in 5.2024, advised primary follow up, 131 and 133 snce.       Preventative Medicine  Cardiovascular Fidel:                        Lipids: normal in 4/23, primary pans to follow per pt                       Blood Pressure: 148/80, as above  Cancer Screening:                       Colon: As above, follow up per gi                       Anal: Planned repeat eval in 6 months  Immunizations:                       Hepatitis A: 1/21/2000, 6/25/1999                       Hepatitis B: 7/13/2012, 1/21/2000, 7/28/1999, 6/25/1999                        Influenza: Done 2024                       Pneumovax/Prevnar PPSV 23 11/20/2014, 2/6/2009, 1/21/2000                       PCV 13 8/18/2017, 7/24/2013                          - discussed PCV20 but based on shared decision making decided against, immunizations can be considered complete for pneumococcus                       Tdap: 2008, 8/2017                       Td 1998                       Meningococcus: 9/21/2015, 7/23/2015                       Covid: 3 doses done, boostee done 2024  Bone Health: No screening indicated at this time  Renal Health: Cr: normal  Sexually Transmitted Infection Risk and Screening:                       Gonorrhea and Chlamydia: declined today                       Syphilis: declined today      Recs  - Labs today -  phenosense Gt plus integrase re-ordered  - Labs early December - HIV Viral load [place] [place]  - Follow up in 2 months with me  - MTM pharmacist referral created - Selene will reach out  - Continue Stribild for now  - Advised to use protection with any sexual activity since no longer undetectable      Total time on day of visit including chart review, visit, counseling, documentation and coordination of care:  49 minutes                Carlos Emerson   of Medicine  Division of Infectious Diseases

## 2024-11-24 ENCOUNTER — HEALTH MAINTENANCE LETTER (OUTPATIENT)
Age: 74
End: 2024-11-24

## 2024-11-26 ENCOUNTER — VIRTUAL VISIT (OUTPATIENT)
Dept: PHARMACY | Facility: CLINIC | Age: 74
End: 2024-11-26
Attending: STUDENT IN AN ORGANIZED HEALTH CARE EDUCATION/TRAINING PROGRAM
Payer: COMMERCIAL

## 2024-11-26 DIAGNOSIS — B20 HUMAN IMMUNODEFICIENCY VIRUS (HIV) DISEASE (H): Primary | ICD-10-CM

## 2024-11-26 DIAGNOSIS — L30.9 CHRONIC DERMATITIS: ICD-10-CM

## 2024-11-26 DIAGNOSIS — I10 ESSENTIAL HYPERTENSION: ICD-10-CM

## 2024-11-26 PROCEDURE — 99605 MTMS BY PHARM NP 15 MIN: CPT | Mod: 93 | Performed by: PHARMACIST

## 2024-11-26 NOTE — PROGRESS NOTES
Medication Therapy Management (MTM) Encounter    ASSESSMENT:                            Medication Adherence/Access: No issues identified.    HIV:   No adherence issues or drug interactions. Planning to recheck HIV RNA at the [facility] lab next month. HIV RNA could be elevated due to a variance in the labs. Will see if we get a phenotype result.     Chronic dermatitis:   No current issues. Discussed that fluconazole can interact with amlodipine and to monitor for feeling lightheaded or dizzy when he does take fluconazole.     Hypertension   Home blood pressure at goal <140/90. Higher in clinic- likely due to stress/white coat syndrome. Stribild increases amlodipine concentrations. Hyponatremia has been better since lowering lisinopril dose.     PLAN:                            Recheck HIV viral load ion 12/20 as planned    Follow-up: 1 month - review labs    SUBJECTIVE/OBJECTIVE:                          Jorge Alberto Rosenthal is a 74 year old male seen for an initial visit. He was referred to me from Dr. Emerson.      Reason for visit: antiretroviral therapy check-in- elevated viral load.    Allergies/ADRs: Reviewed in chart  Past Medical History: Reviewed in chart  Tobacco: He reports that he has never smoked. He has never used smokeless tobacco.  Alcohol: occasional, a few times per year  Medication Adherence/Access: no issues reported.    HIV:   Stribild (elvitegravir/anjelica/emtricitabine/TDF) once daily   He had a bad cold about 14 days prior to the last viral load and felt really stressed. Side effects: none  Diagnosis:1999  Past regimens: has been Stribild since 3/2015. Has been on other regimens. Denied any history of known resistance/mutations/virologic failure per 2015 infectious disease note.   Phenotype: pending  HIV VL: 574 copies on 11/5/24, undetectable 8/20/24, 89 copies on 5/22/24. HIV RNA has been <200 copies since 2015. Unable to see records prior to that.   CD4: 1,017 on 11/5/24    Chronic dermatitis:    Fluconazole 200 mg once daily as needed   Ketoconazole 2% once daily as needed     Uses 2-3 times per year when fungal infection near buttocks falres up. Medications are helpful when needed. No side effects.     Hypertension   Amlodipine 5 mg once daily   Lisinopril 20 mg once daily   Patient reports no current medication side effects  Patient self monitors blood pressure.  Home BP monitoring 110/60s at home.   Previously reduced lisinopril dose from 40 mg/day to 20 mg/day due to hyponatremia.      BP Readings from Last 3 Encounters:   11/20/24 (!) 148/80   11/29/23 (!) 153/86   08/14/23 138/86     Today's Vitals: There were no vitals taken for this visit.  ----------------    I spent 30 minutes with this patient today. All changes were made via collaborative practice agreement with Dr. Emerson. A copy of the visit note was provided to the patient's provider(s).    A summary of these recommendations was sent via [employer]    Telemedicine Visit Details  The patient's medications can be safely assessed via a telemedicine encounter.  Type of service:  Telephone visit  Originating Location (pt. Location): Home    Distant Location (provider location):  Off-site  Start Time: 8:34 AM  End Time:  9:04 am     Medication Therapy Recommendations  No medication therapy recommendations to display

## 2024-11-26 NOTE — LETTER
_  Medication List        Prepared on: Nov 26, 2024     Bring your Medication List when you go to the doctor, hospital, or   emergency room. And, share it with your family or caregivers.     Note any changes to how you take your medications.  Cross out medications when you no longer use them.    Medication How I take it Why I use it Prescriber   amLODIPine (NORVASC) 5 MG tablet Take 1 tablet (5 mg) by mouth daily Essential Hypertension Alex Manzo DO   fluconazole (DIFLUCAN) 200 MG tablet Take 1 tablet by mouth daily as needed Dermatitis Fungal; Asymptomatic HIV Infection (H) Alex Manzo DO   ketoconazole (NIZORAL) 2 % external cream Apply topically daily as needed Dermatitis Fungal; Asymptomatic HIV Infection (H) Alex Manzo DO   lisinopril (ZESTRIL) 20 MG tablet Take 1 tablet (20 mg) by mouth daily Essential Hypertension Alex Manzo DO   STRIBILD [phone] mg tab Take 1 tablet by mouth daily with food.  HIV Carlos Emerson MD         Add new medications, over-the-counter drugs, herbals, vitamins, or  minerals in the blank rows below.    Medication How I take it Why I use it Prescriber                                      Allergies:      - Amoxicillin        Side effects I have had:      Not on File        Other Information:              My notes and questions:

## 2024-11-26 NOTE — PATIENT INSTRUCTIONS
"Recommendations from today's MTM visit:                                                      Recheck HIV viral load ion 12/20 as planned    Follow-up: 1 month - review labs    It was great speaking with you today.  I value your experience and would be very thankful for your time in providing feedback in our clinic survey. In the next few days, you may receive an email or text message from [employer] with a link to a survey related to your  clinical pharmacist.\"     To schedule another MTM appointment, please call the clinic directly or you may call the MTM scheduling line at [phone] or toll-free at [phone].     My Clinical Pharmacist's contact information:                                                      Please feel free to contact me with any questions or concerns you have.      Selene Rollins, PharmD, AAHIVP  Medication Therapy Management Pharmacist      "

## 2024-11-26 NOTE — LETTER
"Recommended To-Do List      Prepared on: Nov 26, 2024       You can get the best results from your medications by completing the items on this \"To-Do List.\"      Bring your To-Do List when you go to your doctor. And, share it with your family or caregivers.    My To-Do List:  What we talked about: What I should do:    Recheck labs   Go to lab 12/20 as planned to recheck viral load              "

## 2024-11-26 NOTE — Clinical Note
11/26/2024       RE: Zac Rosenthal  [address]  [facility] [address]     Dear Colleague,    Thank you for referring your patient, Zac Rosenthal, to the [facility] AND INFECTIOUS DISEASES MTM at [facility]. Please see a copy of my visit note below.    Medication Therapy Management (MTM) Encounter    ASSESSMENT:                            Medication Adherence/Access: {adherencechoices:769699}    ***:   ***    PLAN:                            ***    Follow-up: {followuptest2:909962}    SUBJECTIVE/OBJECTIVE:                          Jorge Alberto Rosenthal is a 74 year old male seen for an initial visit. He was referred to me from Dr. Emerson.      Reason for visit: antiretroviral therapy check-in- elevated viral load.    Allergies/ADRs: Reviewed in chart  Past Medical History: Reviewed in chart  Tobacco: He reports that he has never smoked. He has never used smokeless tobacco.  Alcohol: {ALCOHOL CONSUMPTION HX:574606}    Medication Adherence/Access: {fumedadherence:761347}    HIV:   Stribild (elvitegravir/anjelica/emtricitabine/TDF)  He had a bad cold about 14 days prior to the last viral load and felt really stressed.   Side effects:   Diagnosis: 1990s  Past regimens: has been Stribild since 3/2015. Has been on other regimens. Denied any history of known resistance/mutations/virologic failure per 2015 infectious disease note.   Phenotype: pending  HIV VL: 574 copies on 11/5/24, undetectable 8/20/24, 89 copies on 5/22/24. HIV RNA has been <200 copies since 2015. Unable to see records prior to that.   CD4: 1,017 on 11/5/24    Chronic dermatitis:   Fluconazole 200 mg once daily as needed   Ketoconazole 2% once daily as needed     Uses 2-3 times per year when fungal infection near buttocks falres up.       Hypertension  Amlodipine 5 mg once daily   Lisinopril 20 mg once daily   Patient reports no current medication side effects  Patient self monitors  blood pressure.  Home BP monitoring 110/60s at home.   Previously reduced lisinopril dose from 40 mg/day to 20 mg/day due to hyponatremia.      BP Readings from Last 3 Encounters:   11/20/24 (!) 148/80   11/29/23 (!) 153/86   08/14/23 138/86     Today's Vitals: There were no vitals taken for this visit.  ----------------    I spent {mtm total time 3:209502} with this patient today. All changes were made via collaborative practice agreement with Dr. Emerson. A copy of the visit note was provided to the patient's provider(s).    A summary of these recommendations was sent via [employer]    Telemedicine Visit Details  The patient's medications can be safely assessed via a telemedicine encounter.  Type of service:  Telephone visit  Originating Location (pt. Location): Home  {PROVIDER LOCATION On-site should be selected for visits conducted from your clinic location or adjoining [facility] hospital, academic office, or other nearby [facility] building. Off-site should be selected for all other provider locations, including home:060119}  Distant Location (provider location):  Off-site  Start Time: 8:34 AM  End Time: {video/phone visit end time:827605}     Medication Therapy Recommendations  No medication therapy recommendations to display       Medication Therapy Management (MTM) Encounter    ASSESSMENT:                            Medication Adherence/Access: No issues identified.    HIV:   No adherence issues or drug interactions. Planning to recheck HIV RNA at [place] [facility] [place] next [place]. HIV RNA could be elevated due to a variance in the labs. Will see if we get a phenotype result.     Chronic dermatitis:   No current issues. Medications     Hypertension   Home blood pressure at goal <140/90. Higher in clinic- likely due to stress/white coat syndrome. Stribild increases amlodipine concentrations. Hyponatremia has been better since lowering lisinopril dose.     PLAN:                            ***    Follow-up:  {followuptest2:856571}    SUBJECTIVE/OBJECTIVE:                          Jorge Alberto Rosenthal is a 74 year old male seen for an initial visit. He was referred to me from Dr. Emerson.      Reason for visit: antiretroviral therapy check-in- elevated viral load.    Allergies/ADRs: Reviewed in chart  Past Medical History: Reviewed in chart  Tobacco: He reports that he has never smoked. He has never used smokeless tobacco.  Alcohol: occasional, a few times per year  Medication Adherence/Access: no issues reported.    HIV:   Stribild (elvitegravir/anjelica/emtricitabine/TDF) once daily   He had a bad cold about 14 days prior to the last viral load and felt really stressed. Side effects: none  Diagnosis:1999  Past regimens: has been Stribild since 3/2015. Has been on other regimens. Denied any history of known resistance/mutations/virologic failure per 2015 infectious disease note.   Phenotype: pending  HIV VL: 574 copies on 11/5/24, undetectable 8/20/24, 89 copies on 5/22/24. HIV RNA has been <200 copies since 2015. Unable to see records prior to that.   CD4: 1,017 on 11/5/24    Chronic dermatitis:   Fluconazole 200 mg once daily as needed   Ketoconazole 2% once daily as needed     Uses 2-3 times per year when fungal infection near buttocks falres up. Medications are helpful.     Hypertension  Amlodipine 5 mg once daily   Lisinopril 20 mg once daily   Patient reports no current medication side effects  Patient self monitors blood pressure.  Home BP monitoring 110/60s at home.   Previously reduced lisinopril dose from 40 mg/day to 20 mg/day due to hyponatremia.      BP Readings from Last 3 Encounters:   11/20/24 (!) 148/80   11/29/23 (!) 153/86   08/14/23 138/86     Today's Vitals: There were no vitals taken for this visit.  ----------------    I spent {[facility] total time 3:695542} with this patient today. All changes were made via collaborative practice agreement with Dr. Emerson. A copy of the visit note was provided to the patient's  provider(s).    A summary of these recommendations was sent via [employer]    Telemedicine Visit Details  The patient's medications can be safely assessed via a telemedicine encounter.  Type of service:  Telephone visit  Originating Location (pt. Location): Home  {PROVIDER LOCATION On-site should be selected for visits conducted from your clinic location or adjoining [facility] hospital, academic office, or other nearby [facility] building. Off-site should be selected for all other provider locations, including home:171060}  Distant Location (provider location):  Off-site  Start Time: 8:34 AM  End Time: {video/phone visit end time:497089}     Medication Therapy Recommendations  No medication therapy recommendations to display         Again, thank you for allowing me to participate in the care of your patient.      Sincerely,    RAY PERDOMO RPH

## 2024-11-26 NOTE — LETTER
November 26, 2024  Zac oRsenthal  [address]   [facility] [place] [address]    Dear Mr. Rosenthal, MERLENE [facility] AND INFECTIOUS DISEASES [facility]     Thank you for talking with me on Nov 26, 2024 about your health and medications. As a follow-up to our conversation, I have included two documents:      Your Recommended To-Do List has steps you should take to get the best results from your medications.  Your Medication List will help you keep track of your medications and how to take them.    If you want to talk about these documents, please call RAY PERDOMO RPH at phone: [phone], Monday-Friday 8-4:30pm.    I look forward to working with you and your doctors to make sure your medications work well for you.    Sincerely,  RAY PERDOMO RPH  [facility] Pharmacist, [facility]

## 2024-12-04 LAB
GENE ANALYSIS NARR RPT DOC: NORMAL
HIV 1 RNA GENO + PHENO ISLT: NORMAL
HIV SUSC PNL ISLT PHENOTYP: NORMAL
HIV-1 PSGT + INTEGRASE, GENOTYPE: NORMAL
LABORATORY COMMENT REPORT: NORMAL

## 2024-12-06 PROCEDURE — G0452 MOLECULAR PATHOLOGY INTERPR: HCPCS | Mod: 26 | Performed by: PATHOLOGY

## 2024-12-06 PROCEDURE — 88112 CYTOPATH CELL ENHANCE TECH: CPT | Mod: TC | Performed by: NURSE PRACTITIONER

## 2024-12-06 PROCEDURE — 87624 HPV HI-RISK TYP POOLED RSLT: CPT | Performed by: NURSE PRACTITIONER

## 2024-12-06 PROCEDURE — 88305 TISSUE EXAM BY PATHOLOGIST: CPT | Mod: TC | Performed by: NURSE PRACTITIONER

## 2024-12-06 PROCEDURE — 88112 CYTOPATH CELL ENHANCE TECH: CPT | Mod: 26 | Performed by: PATHOLOGY

## 2024-12-06 PROCEDURE — 88305 TISSUE EXAM BY PATHOLOGIST: CPT | Mod: 26 | Performed by: STUDENT IN AN ORGANIZED HEALTH CARE EDUCATION/TRAINING PROGRAM

## 2024-12-18 ENCOUNTER — LAB (OUTPATIENT)
Dept: LAB | Facility: CLINIC | Age: 74
End: 2024-12-18
Payer: COMMERCIAL

## 2024-12-18 DIAGNOSIS — Z21 HUMAN IMMUNODEFICIENCY VIRUS I INFECTION (H): ICD-10-CM

## 2024-12-18 PROCEDURE — 99000 SPECIMEN HANDLING OFFICE-LAB: CPT | Performed by: PATHOLOGY

## 2024-12-18 PROCEDURE — 87536 HIV-1 QUANT&REVRSE TRNSCRPJ: CPT | Performed by: STUDENT IN AN ORGANIZED HEALTH CARE EDUCATION/TRAINING PROGRAM

## 2024-12-18 PROCEDURE — 36415 COLL VENOUS BLD VENIPUNCTURE: CPT | Performed by: PATHOLOGY

## 2024-12-19 LAB — HIV1 RNA # PLAS NAA DL=20: NOT DETECTED COPIES/ML

## 2025-01-05 SDOH — HEALTH STABILITY: PHYSICAL HEALTH: ON AVERAGE, HOW MANY DAYS PER WEEK DO YOU ENGAGE IN MODERATE TO STRENUOUS EXERCISE (LIKE A BRISK WALK)?: 6 DAYS

## 2025-01-05 SDOH — HEALTH STABILITY: PHYSICAL HEALTH: ON AVERAGE, HOW MANY MINUTES DO YOU ENGAGE IN EXERCISE AT THIS LEVEL?: 120 MIN

## 2025-01-05 ASSESSMENT — SOCIAL DETERMINANTS OF HEALTH (SDOH): HOW OFTEN DO YOU GET TOGETHER WITH FRIENDS OR RELATIVES?: PATIENT DECLINED

## 2025-01-09 ENCOUNTER — OFFICE VISIT (OUTPATIENT)
Dept: FAMILY MEDICINE | Facility: CLINIC | Age: 75
End: 2025-01-09
Payer: COMMERCIAL

## 2025-01-09 VITALS
HEIGHT: 69 IN | DIASTOLIC BLOOD PRESSURE: 71 MMHG | OXYGEN SATURATION: 98 % | BODY MASS INDEX: 30.36 KG/M2 | SYSTOLIC BLOOD PRESSURE: 132 MMHG | RESPIRATION RATE: 16 BRPM | TEMPERATURE: 98.2 F | HEART RATE: 74 BPM | WEIGHT: 205 LBS

## 2025-01-09 DIAGNOSIS — B20 HUMAN IMMUNODEFICIENCY VIRUS (HIV) DISEASE (H): ICD-10-CM

## 2025-01-09 DIAGNOSIS — I10 ESSENTIAL HYPERTENSION: ICD-10-CM

## 2025-01-09 DIAGNOSIS — B36.9 DERMATITIS FUNGAL: ICD-10-CM

## 2025-01-09 DIAGNOSIS — Z00.00 ENCOUNTER FOR ANNUAL WELLNESS EXAM IN MEDICARE PATIENT: Primary | ICD-10-CM

## 2025-01-09 RX ORDER — KETOCONAZOLE 20 MG/G
CREAM TOPICAL DAILY PRN
Qty: 15 G | Refills: 5 | Status: SHIPPED | OUTPATIENT
Start: 2025-01-09

## 2025-01-09 RX ORDER — FLUCONAZOLE 200 MG/1
200 TABLET ORAL DAILY PRN
Qty: 30 TABLET | Refills: 3 | Status: SHIPPED | OUTPATIENT
Start: 2025-01-09

## 2025-01-09 NOTE — PROGRESS NOTES
"Preventive Care Visit  [facility]  Alex Manzo DO, Family Medicine  Jan 9, 2025      Assessment & Plan     Encounter for annual wellness exam in Medicare patient  I encouraged him to keep getting regular exercise and eat a healthy diet.  He is going to return to the clinic for a fasting cholesterol panel and metabolic panel.    Essential hypertension  Blood pressure is stable controlled on lisinopril 20 mg daily and amlodipine 5 mg daily.  - Basic metabolic panel  (Ca, Cl, CO2, Creat, Gluc, K, Na, BUN); Future    Asymptomatic HIV infection (H)  This issue is stable on Stribild.  He is following with infectious disease and is viral load was negative on the most recent check.  He was given refills of fluconazole and ketoconazole to have available as needed for fungal dermatitis symptoms when they flareup.  - fluconazole (DIFLUCAN) 200 MG tablet; Take 1 tablet (200 mg) by mouth daily as needed. Take 1 tablet by mouth daily.  - ketoconazole (NIZORAL) 2 % external cream; Apply topically daily as needed for itching.    Dermatitis fungal  Stable with antifungals as needed.  - fluconazole (DIFLUCAN) 200 MG tablet; Take 1 tablet (200 mg) by mouth daily as needed. Take 1 tablet by mouth daily.  - ketoconazole (NIZORAL) 2 % external cream; Apply topically daily as needed for itching.      Patient has been advised of split billing requirements and indicates understanding: Yes        BMI  Estimated body mass index is 30.27 kg/m  as calculated from the following:    Height as of this encounter: 1.753 m (5' 9\").    Weight as of this encounter: 93 kg (205 lb).   Weight management plan: Discussed healthy diet and exercise guidelines    Counseling  Appropriate preventive services were addressed with this patient via screening, questionnaire, or discussion as appropriate for fall prevention, nutrition, physical activity, Tobacco-use cessation, social engagement, weight loss and cognition.  Checklist " reviewing preventive services available has been given to the patient.  Reviewed patient's diet, addressing concerns and/or questions.           Pamela Azul is a 74 year old, presenting for the following:  Physical        1/9/2025     6:55 AM   Additional Questions   Roomed by ANGELINE MORA    He has a medical history significant for hypertension, HIV, fungal dermatitis, hyponatremia and anal dysplasia. He has been on lisinopril 20 mg daily and amlodipine 5 mg daily. He also takes fluconazole and topical ketoconazole as needed. He follows closely with his colorectal specialist and he had his last anoscopy on 12/6/2024 and colonoscopy on 7/21/2022 which showed 2 tubular adenomas. They recommended a 5-year follow-up for his next colonoscopy and repeat anal Pap in 1 year. He gets labs checked every 6 months through his infectious disease specialist. His HIV viral load was undetectable on the most recent check. He is doing well on Stribild.                 1/5/2025   General Health   How would you rate your overall physical health? Good   Feel stress (tense, anxious, or unable to sleep) Not at all         1/5/2025   Nutrition   Diet: Regular (no restrictions)         1/5/2025   Exercise   Days per week of moderate/strenous exercise 6 days   Average minutes spent exercising at this level 120 min         1/5/2025   Social Factors   Frequency of gathering with friends or relatives Patient declined   Worry food won't last until get money to buy more No   Food not last or not have enough money for food? No   Do you have housing? (Housing is defined as stable permanent housing and does not include staying ouside in a car, in a tent, in an abandoned building, in an overnight shelter, or couch-surfing.) Yes   Are you worried about losing your housing? No   Lack of transportation? No   Unable to get utilities (heat,electricity)? No         1/9/2025   Fall Risk   Fallen 2 or more times in the past year? No    Trouble with walking or balance? No          1/5/2025   Activities of Daily Living- Home Safety   Needs help with the following daily activites None of the above   Safety concerns in the home None of the above         1/5/2025   Dental   Dentist two times every year? Yes         1/5/2025   Hearing Screening   Hearing concerns? None of the above         1/5/2025   Driving Risk Screening   Patient/family members have concerns about driving No         1/5/2025   General Alertness/Fatigue Screening   Have you been more tired than usual lately? No         1/5/2025   Urinary Incontinence Screening   Bothered by leaking urine in past 6 months No         1/5/2025   TB Screening   Were you born outside of the [place]? No         Today's PHQ-2 Score:       1/9/2025     6:43 AM   PHQ-2 ( 1999 [employer])   Q1: Little interest or pleasure in doing things 0   Q2: Feeling down, depressed or hopeless 0   PHQ-2 Score 0    Q1: Little interest or pleasure in doing things Not at all   Q2: Feeling down, depressed or hopeless Not at all   PHQ-2 Score 0       Patient-reported           1/5/2025   Substance Use   Alcohol more than 3/day or more than 7/wk Not Applicable   Do you have a current opioid prescription? No   How severe/bad is pain from 1 to 10? 0/10 (No Pain)   Do you use any other substances recreationally? No     Social History     Tobacco Use    Smoking status: Never    Smokeless tobacco: Never   Vaping Use    Vaping status: Never Used   Substance Use Topics    Alcohol use: Yes     Alcohol/week: 0.0 standard drinks of alcohol     Comment: 3x yr    Drug use: No           1/5/2025   AAA Screening   Family history of Abdominal Aortic Aneurysm (AAA)? No   ASCVD Risk   The 10-year ASCVD risk score (Marilou THURMAN, et al., 2019) is: 26.4%    Values used to calculate the score:      Age: 74 years      Sex: Male      Is Non- : No      Diabetic: No      Tobacco smoker: No      Systolic Blood Pressure: 132 mmHg      " Is BP treated: Yes      HDL Cholesterol: 54 mg/dL      Total Cholesterol: 144 mg/dL            Reviewed and updated as needed this visit by Provider     Meds                  Current providers sharing in care for this patient include:  Patient Care Team:  Alex Motta DO as PCP - General (Family Medicine)  Rosangela Pritchett APRN CNP as Assigned Surgical Provider  Carlos Emerson MD as Assigned Infectious Disease Provider  Alex Motta DO as Assigned PCP  Selene Rollins RPH as Pharmacist (Pharmacist Ambulatory Care)  Selene Rollins RPH as Assigned [place] Pharmacist    The following health maintenance items are reviewed in Epic and correct as of today:  Health Maintenance   Topic Date Due    Anal Microscopy (high resolution anoscopy)  09/10/2023    MEDICARE ANNUAL WELLNESS VISIT  09/12/2023    LIPID  05/22/2025    BMP  11/05/2025    ANNUAL REVIEW OF HM ORDERS  01/09/2026    FALL RISK ASSESSMENT  01/09/2026    COLORECTAL CANCER SCREENING  07/21/2027    DTAP/TDAP/TD IMMUNIZATION (3 - Td or Tdap) 08/18/2027    ADVANCE CARE PLANNING  09/12/2027    MENINGITIS IMMUNIZATION (4 - Risk 2-dose series) 10/19/2027    GLUCOSE  11/05/2027    HEPATITIS C SCREENING  Completed    PHQ-2 (once per calendar year)  Completed    INFLUENZA VACCINE  Completed    Pneumococcal Vaccine: 50+ Years  Completed    ZOSTER IMMUNIZATION  Completed    HEPATITIS A IMMUNIZATION  Completed    HEPATITIS B IMMUNIZATION  Completed    COVID-19 Vaccine  Completed    HPV IMMUNIZATION  Aged Out    RSV MONOCLONAL ANTIBODY  Aged Out    RSV VACCINE  Discontinued         Review of Systems  Constitutional, HEENT, cardiovascular, pulmonary, GI, , musculoskeletal, neuro, skin, endocrine and psych systems are negative, except as otherwise noted.     Objective    Exam  /71   Pulse 74   Temp 98.2  F (36.8  C) (Temporal)   Resp 16   Ht 1.753 m (5' 9\")   Wt 93 kg (205 lb)   SpO2 98%   BMI 30.27 kg/m     Estimated body " "mass index is 30.27 kg/m  as calculated from the following:    Height as of this encounter: 1.753 m (5' 9\").    Weight as of this encounter: 93 kg (205 lb).    Physical Exam  GENERAL: alert and no distress  EYES: Eyes grossly normal to inspection, PERRL and conjunctivae and sclerae normal  HENT: ear canals and TM's normal, nose and mouth without ulcers or lesions  NECK: no adenopathy, no asymmetry, masses, or scars  RESP: lungs clear to auscultation - no rales, rhonchi or wheezes  CV: regular rate and rhythm, normal S1 S2, no S3 or S4, no murmur, click or rub, no peripheral edema  ABDOMEN: soft, nontender, no hepatosplenomegaly, no masses and bowel sounds normal  MS: no gross musculoskeletal defects noted, no edema  SKIN: no suspicious lesions or rashes  NEURO: Normal strength and tone, mentation intact and speech normal  PSYCH: mentation appears normal, affect normal/bright         1/9/2025   Mini Cog   Clock Draw Score 2 Normal   3 Item Recall 3 objects recalled   Mini Cog Total Score 5              Signed Electronically by: Alex Manzo DO    "

## 2025-01-27 ENCOUNTER — OFFICE VISIT (OUTPATIENT)
Dept: INFECTIOUS DISEASES | Facility: CLINIC | Age: 75
End: 2025-01-27
Attending: STUDENT IN AN ORGANIZED HEALTH CARE EDUCATION/TRAINING PROGRAM
Payer: COMMERCIAL

## 2025-01-27 VITALS
BODY MASS INDEX: 30.79 KG/M2 | HEART RATE: 68 BPM | HEIGHT: 69 IN | TEMPERATURE: 97.8 F | DIASTOLIC BLOOD PRESSURE: 81 MMHG | WEIGHT: 207.9 LBS | SYSTOLIC BLOOD PRESSURE: 149 MMHG

## 2025-01-27 DIAGNOSIS — Z51.81 THERAPEUTIC DRUG MONITORING: ICD-10-CM

## 2025-01-27 DIAGNOSIS — Z21 HUMAN IMMUNODEFICIENCY VIRUS I INFECTION (H): Primary | ICD-10-CM

## 2025-01-27 PROCEDURE — 99214 OFFICE O/P EST MOD 30 MIN: CPT | Performed by: STUDENT IN AN ORGANIZED HEALTH CARE EDUCATION/TRAINING PROGRAM

## 2025-01-27 PROCEDURE — G2211 COMPLEX E/M VISIT ADD ON: HCPCS | Performed by: STUDENT IN AN ORGANIZED HEALTH CARE EDUCATION/TRAINING PROGRAM

## 2025-01-27 PROCEDURE — G0463 HOSPITAL OUTPT CLINIC VISIT: HCPCS | Performed by: STUDENT IN AN ORGANIZED HEALTH CARE EDUCATION/TRAINING PROGRAM

## 2025-01-27 ASSESSMENT — PAIN SCALES - GENERAL: PAINLEVEL_OUTOF10: NO PAIN (0)

## 2025-01-27 NOTE — PATIENT INSTRUCTIONS
- VL in 6 months  - Labs in 1 year, follow up in 1 year - no lipids, but CD4, Vl, CBC w diff, c,p  - Continue Stribild, call for refills as needed

## 2025-01-27 NOTE — LETTER
1/27/2025       RE: Zac Rosenthal  [address]  [facility] [address]     Dear Colleague,    Thank you for referring your patient, Zac Rosenthal, to the [facility] [place] at [facility]. Please see a copy of my visit note below.    Infectious Disease Clinic Follow Up Visit    HPI:  Zac Rosenthal is a 74 year old male who is here for follow-up on HIV treatment.  He was previously followed by Dr Victor Hugo Dacosta, I took over care once Dr Dacosta retired from clinical duties.     He is currently taking Stribild once a day without missing doses and no side effects.     Had a high resolution anoscopy in May 2018, August 2018, and Feb 2019.  The cytology from Feb 2019 showed no abnormality.   At follow up with CRS in 9/2021: No evidence of high grade dysplasia. 8/22, LGSIL(AIN1), 3/23 LGSIL 9/23 HGSIL, 12/24 CRS HRA negative for Intraepithelial Lesion or Malignancy (NILM)     Also had a colonoscopy which showed a tubular adenoma in April 2017.Colonoscopy recommended after 5 years from then, done 7/21/22 with two tubular adenomas.    He has an established primary care with family medicine, Dr. Francis Cordon, who has him on lisinpril and amlodipine for hypertension.     Not sexually active, decline STI testing today    Blips have been recent concern. Additional discussion as below  Vaccines uptodate - Zac clarifies he got the flu shot      Medications:  Current Outpatient Medications   Medication Sig Dispense Refill     amLODIPine (NORVASC) 5 MG tablet Take 1 tablet (5 mg) by mouth daily 90 tablet 2     fluconazole (DIFLUCAN) 200 MG tablet Take 1 tablet (200 mg) by mouth daily as needed. Take 1 tablet by mouth daily. 30 tablet 3     ketoconazole (NIZORAL) 2 % external cream Apply topically daily as needed for itching. 15 g 5     lisinopril (ZESTRIL) 20 MG tablet Take 1 tablet (20 mg) by mouth daily 90 tablet 2      STRIBILD [phone] mg tab Take 1 tablet by mouth daily with food. 30 tablet 5       Exam:  There were no vitals taken for this visit.    Physical Exam   Constitutional: He is well-developed, well-nourished, and in no distress. No difficulty breathing or speaking.  No cough  Neurological: He is alert. No gross FNDs  Psychiatric: Affect and judgment normal.   RS: CTAB  CVS: S1 S2 normal, RRR, No RMG  Skin: No obvious rashes    T Cell Subset:  Absolute CD4   Date Value Ref Range Status   05/18/2021 1,176 441 - 2,156 cells/uL Final     Absolute CD4, [place] T Cells   Date Value Ref Range Status   11/05/2024 1,017 441 - 2,156 cells/uL Final     CD4 [place] T   Date Value Ref Range Status   05/18/2021 37 28 - 63 % Final     CD4% [place] T Cells   Date Value Ref Range Status   11/05/2024 34 28 - 63 % Final     CD8 Suppressor T   Date Value Ref Range Status   05/18/2021 39 10 - 40 % Final     CD8% Suppressor T Cells   Date Value Ref Range Status   11/05/2024 46 (H) 10 - 40 % Final     CD3 Mature T   Date Value Ref Range Status   05/18/2021 77 49 - 84 % Final     CD3% Total T Cells   Date Value Ref Range Status   11/05/2024 81 49 - 84 % Final     CD4:CD8 Ratio   Date Value Ref Range Status   11/05/2024 0.73 (L) 1.40 - 2.60 Final   05/18/2021 0.95 (L) 1.40 - 2.60 Final     WBC   Date Value Ref Range Status   05/18/2021 7.1 4.0 - 11.0 10e9/L Final     WBC Count   Date Value Ref Range Status   11/05/2024 9.4 4.0 - 11.0 10e3/uL Final     % Lymphocytes   Date Value Ref Range Status   11/05/2024 29 % Final   05/18/2021 42.7 % Final     Absolute CD3   Date Value Ref Range Status   05/18/2021 2,483 603 - 2,990 cells/uL Final     Absolute CD3, Total T Cells   Date Value Ref Range Status   11/05/2024 2,457 603 - 2,990 cells/uL Final     Absolute CD8   Date Value Ref Range Status   05/18/2021 1,267 125 - 1,312 cells/uL Final     Absolute CD8, Suppressor T Cells   Date Value Ref Range Status   11/05/2024 1,393 (H) 125 - 1,312  cells/uL Final       HIV-1 RNA Quantitative:  HIV-1 RNA Quant Result   Date Value Ref Range Status   05/18/2021 21 (A) HIVND^HIV-1 RNA Not Detected [Copies]/mL Final     Comment:     The ANAT AmpliPrep/ANAT TaqMan HIV-1 test is an FDA-approved in vitro   nucleic acid amplification test for the quantitation of HIV-1 RNA in human   plasma (EDTA plasma) using the ANAT AmpliPrep instrument for automated viral   nucleic acid extraction and the ANAT TaqMan Analyzer or ANAT TaqMan for   automated Real Time PCR amplification and detection of the viral nucleic acid   target.  Titer results are reported in copies/ml. This assay is intended for use in   conjunction with clinical presentation and other laboratory markers of disease   prognosis and for use as an aid in assessing viral response to antiretroviral   treatment as measured by changes in plasma HIV-1 RNA levels. This test should   not be used as a donor screening test to confirm the presence of HIV-1   infection.       HIV-1 RNA copies/mL   Date Value Ref Range Status   12/18/2024 Not Detected Not Detected Copies/mL Final   04/19/2023 Not Detected Not Detected Copies/mL Final     HIV-1 RNA Copies/mL, Instrument   Date Value Ref Range Status   11/05/2024 574 (H) Not Detected copies/mL Final   04/12/2022 38 (H) <1 copies/mL Final     HIV RNA QT Log   Date Value Ref Range Status   05/18/2021 1.3 (H) <1.3 [Log_copies]/mL Final     HIV-1 log   Date Value Ref Range Status   11/05/2024 2.8  Final   04/12/2022 1.6  Final       Lab Results   Component Value Date    WBC 7.1 08/24/2021    WBC 7.1 05/18/2021     Lab Results   Component Value Date    RBC 5.26 08/24/2021    RBC 5.14 05/18/2021     Lab Results   Component Value Date    HGB 17.1 08/24/2021    HGB 16.4 05/18/2021     Lab Results   Component Value Date    HCT 50.0 08/24/2021    HCT 48.3 05/18/2021     No components found for: MCT  Lab Results   Component Value Date    MCV 95 08/24/2021    MCV 94 05/18/2021     Lab  Results   Component Value Date    MCH 32.5 08/24/2021    MCH 31.9 05/18/2021     Lab Results   Component Value Date    MCHC 34.2 08/24/2021    MCHC 34.0 05/18/2021     Lab Results   Component Value Date    RDW 12.4 08/24/2021    RDW 12.8 05/18/2021     Lab Results   Component Value Date     08/24/2021     05/18/2021       Last Comprehensive Metabolic Panel:  Sodium   Date Value Ref Range Status   11/05/2024 133 (L) 135 - 145 mmol/L Final   05/18/2021 133 133 - 144 mmol/L Final     Potassium   Date Value Ref Range Status   11/05/2024 4.2 3.4 - 5.3 mmol/L Final   10/04/2022 4.3 3.4 - 5.3 mmol/L Final   05/18/2021 4.4 3.4 - 5.3 mmol/L Final     Chloride   Date Value Ref Range Status   11/05/2024 98 98 - 107 mmol/L Final   10/04/2022 101 94 - 109 mmol/L Final   05/18/2021 100 94 - 109 mmol/L Final     Carbon Dioxide   Date Value Ref Range Status   05/18/2021 27 20 - 32 mmol/L Final     Carbon Dioxide (CO2)   Date Value Ref Range Status   11/05/2024 24 22 - 29 mmol/L Final   10/04/2022 28 20 - 32 mmol/L Final     Anion Gap   Date Value Ref Range Status   11/05/2024 11 7 - 15 mmol/L Final   10/04/2022 5 3 - 14 mmol/L Final   05/18/2021 6 3 - 14 mmol/L Final     Glucose   Date Value Ref Range Status   11/05/2024 91 70 - 99 mg/dL Final   10/04/2022 89 70 - 99 mg/dL Final   05/18/2021 89 70 - 99 mg/dL Final     Urea Nitrogen   Date Value Ref Range Status   11/05/2024 12.1 8.0 - 23.0 mg/dL Final   10/04/2022 13 7 - 30 mg/dL Final   05/18/2021 12 7 - 30 mg/dL Final     Creatinine   Date Value Ref Range Status   11/05/2024 0.92 0.67 - 1.17 mg/dL Final   05/18/2021 0.88 0.66 - 1.25 mg/dL Final     GFR Estimate   Date Value Ref Range Status   11/05/2024 87 >60 mL/min/1.73m2 Final     Comment:     eGFR calculated using 2021 CKD-EPI equation.   05/18/2021 86 >60 mL/min/[1.73_m2] Final     Comment:     Non  GFR Calc  Starting 12/18/2018, serum creatinine based estimated GFR (eGFR) will be   calculated  using the Chronic Kidney Disease Epidemiology Collaboration   (CKD-EPI) equation.       Calcium   Date Value Ref Range Status   11/05/2024 9.0 8.8 - 10.4 mg/dL Final     Comment:     Reference intervals for this test were updated on 7/16/2024 to reflect our healthy population more accurately. There may be differences in the flagging of prior results with similar values performed with this method. Those prior results can be interpreted in the context of the updated reference intervals.   05/18/2021 9.3 8.5 - 10.1 mg/dL Final     Bilirubin Total   Date Value Ref Range Status   11/05/2024 0.8 <=1.2 mg/dL Final   05/18/2021 0.6 0.2 - 1.3 mg/dL Final     Alkaline Phosphatase   Date Value Ref Range Status   11/05/2024 78 40 - 150 U/L Final   05/18/2021 88 40 - 150 U/L Final     ALT   Date Value Ref Range Status   11/05/2024 16 0 - 70 U/L Final   05/18/2021 20 0 - 70 U/L Final     AST   Date Value Ref Range Status   11/05/2024 28 0 - 45 U/L Final   05/18/2021 21 0 - 45 U/L Final       Assessment and Plan:  Zac Rosenthal is a 74 year old male who is here for followup on HIV treatment.    #HIV: Was well controlled on Stribild. No missed doses. Had some blips in viral load in 2/2021 to 188, since then undetectable in May 2021, 8/2021,10/22, 10/23. CD4 1393 in 10/23. In 5/2024 had another Viral load with a Blip to 89, still considered undetectable (<200), planned to monitor every 3 months. Labs 11/5/2024 at 574. CD4 reassuringly 1017. Ordered phenosense add on at the time but appeared  to not be done. At last visit in 11/2024 we reordered this - Resistance test returned canceled as it showed virus level was too low to do resistance in their test, as we had anticipated. This was reassuring.     He is 100% compliant, uses a pill box. Had a new med - amlodipine - stribild increases its level but no effect on Stribild levels on my review. We created an MTM referral. No new food/multivitamins. We planned to repeat in our lab  (some reported lab issues lately - he got at [facility] lab) 4 weeks from last, and  closely follow up. Planned to consider proviral DNA as next step is remains an issue. He was not sexually active, discussed no longer undetectable and advised condom use w sexual activity. December viral load(at our lab) was excellently undetectable. At this point no concerns - we can go to 6 monthly VL checks and 1 year routine labs as usual.    #Leucocytosis.10/23 labs with elevated WBC. No localizing symptoms on ROS. Subsequent checks normal    #HTN: The patient follows up with his PCP to address his elevated BP. On lisinopril and new amlodipine. Home Bps have been always <130, DBP 60s(115/70s at home). Today at 140/80s too. 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 Subsequent follow-up again in Feb 2019 showed no dysplasia.  At follow up with CRS in 9/2021: No evidence of high grade dysplasia. 8/22, LGSIL(AIN1), 3/23 LGSIL 9/23 HGSIL, 12/24 CRS HRA negative for Intraepithelial Lesion or Malignancy (NILM), CRS following    #Colonic tubular adenoma polyp in April 2017.  Colonoscopy recommended after 5 years from then. Repeat colonoscopy w 2 tubular adenomas in 7/22. Follow up per GI    #Low sodium, resolved.  On review, remained a bit low and stable since at least 2018, determination was he likely just has a chronically low level,plan to watch. Had diarrhea now improved after adjustign diet, NA now normal. 129 in 5.2024, advised primary follow up, 131 and 133 snce.     #Chronic dermatitis - He uses fluconazole and keotconazole as needed, 2-3 times per year when fungal dermatitis near buttocks flare up, per primary. Denies active issues currently      Preventative Medicine  Cardiovascular Fidel:                        Lipids: normal in 4/23, primary plans to follow per pt                       Blood Pressure: as  above  Cancer Screening:                       Colon: As above, follow up per gi                       Anal: CRS following  Immunizations:                       Hepatitis A: 1/21/2000, 6/25/1999                       Hepatitis B: 7/13/2012, 1/21/2000, 7/28/1999, 6/25/1999                        Influenza: Done 2024                       Pneumovax/Prevnar PPSV 23 11/20/2014, 2/6/2009, 1/21/2000                       PCV 13 8/18/2017, 7/24/2013                          - discussed PCV20 but based on shared decision making decided against, immunizations can be considered complete for pneumococcus                       Tdap: 2008, 8/2017                       Td 1998                       Meningococcus: 9/21/2015, 7/23/2015                       Covid: 3 doses done, booster done 2024  Bone Health: No screening indicated at this time  Renal Health: Cr: normal  Sexually Transmitted Infection Risk and Screening:                       Gonorrhea and Chlamydia: declined today                       Syphilis: declined today        Recs  - VL in 6 months  - Labs in 1 year, follow up in 1 year - no lipids, but CD4, Vl, CBC w diff, cmp  - Continue Stribild     Total time on day of visit including chart review, visit, counseling, documentation and coordination of care: 36 minutes                Carlos Emerson   of Medicine  Division of Infectious Diseases      Again, thank you for allowing me to participate in the care of your patient.      Sincerely,    Carlos Emerson MD

## 2025-01-27 NOTE — PROGRESS NOTES
Infectious Disease Clinic Follow Up Visit    HPI:  Zac Rosenthal is a 74 year old male who is here for follow-up on HIV treatment.  He was previously followed by Dr Victor Hugo Dacosta, I took over care once Dr Dacosta retired from clinical duties.     He is currently taking Stribild once a day without missing doses and no side effects.     Had a high resolution anoscopy in May 2018, August 2018, and Feb 2019.  The cytology from Feb 2019 showed no abnormality.   At follow up with CRS in 9/2021: No evidence of high grade dysplasia. 8/22, LGSIL(AIN1), 3/23 LGSIL 9/23 HGSIL, 12/24 CRS HRA negative for Intraepithelial Lesion or Malignancy (NILM)     Also had a colonoscopy which showed a tubular adenoma in April 2017.Colonoscopy recommended after 5 years from then, done 7/21/22 with two tubular adenomas.    He has an established primary care with family medicine, Dr. Francis Cordon, who has him on lisinpril and amlodipine for hypertension.     Not sexually active, decline STI testing today    Blips have been recent concern. Additional discussion as below  Vaccines uptodate - Zac clarifies he got the flu shot      Medications:  Current Outpatient Medications   Medication Sig Dispense Refill    amLODIPine (NORVASC) 5 MG tablet Take 1 tablet (5 mg) by mouth daily 90 tablet 2    fluconazole (DIFLUCAN) 200 MG tablet Take 1 tablet (200 mg) by mouth daily as needed. Take 1 tablet by mouth daily. 30 tablet 3    ketoconazole (NIZORAL) 2 % external cream Apply topically daily as needed for itching. 15 g 5    lisinopril (ZESTRIL) 20 MG tablet Take 1 tablet (20 mg) by mouth daily 90 tablet 2    STRIBILD [phone] mg tab Take 1 tablet by mouth daily with food. 30 tablet 5       Exam:  There were no vitals taken for this visit.    Physical Exam   Constitutional: He is well-developed, well-nourished, and in no distress. No difficulty breathing or speaking.  No cough  Neurological: He is alert. No gross FNDs  Psychiatric: Affect  and judgment normal.   RS: CTAB  CVS: S1 S2 normal, RRR, No RMG  Skin: No obvious rashes    T Cell Subset:  Absolute CD4   Date Value Ref Range Status   05/18/2021 1,176 441 - 2,156 cells/uL Final     Absolute CD4, [place] T Cells   Date Value Ref Range Status   11/05/2024 1,017 441 - 2,156 cells/uL Final     CD4 [place] T   Date Value Ref Range Status   05/18/2021 37 28 - 63 % Final     CD4% [place] T Cells   Date Value Ref Range Status   11/05/2024 34 28 - 63 % Final     CD8 Suppressor T   Date Value Ref Range Status   05/18/2021 39 10 - 40 % Final     CD8% Suppressor T Cells   Date Value Ref Range Status   11/05/2024 46 (H) 10 - 40 % Final     CD3 Mature T   Date Value Ref Range Status   05/18/2021 77 49 - 84 % Final     CD3% Total T Cells   Date Value Ref Range Status   11/05/2024 81 49 - 84 % Final     CD4:CD8 Ratio   Date Value Ref Range Status   11/05/2024 0.73 (L) 1.40 - 2.60 Final   05/18/2021 0.95 (L) 1.40 - 2.60 Final     WBC   Date Value Ref Range Status   05/18/2021 7.1 4.0 - 11.0 10e9/L Final     WBC Count   Date Value Ref Range Status   11/05/2024 9.4 4.0 - 11.0 10e3/uL Final     % Lymphocytes   Date Value Ref Range Status   11/05/2024 29 % Final   05/18/2021 42.7 % Final     Absolute CD3   Date Value Ref Range Status   05/18/2021 2,483 603 - 2,990 cells/uL Final     Absolute CD3, Total T Cells   Date Value Ref Range Status   11/05/2024 2,457 603 - 2,990 cells/uL Final     Absolute CD8   Date Value Ref Range Status   05/18/2021 1,267 125 - 1,312 cells/uL Final     Absolute CD8, Suppressor T Cells   Date Value Ref Range Status   11/05/2024 1,393 (H) 125 - 1,312 cells/uL Final       HIV-1 RNA Quantitative:  HIV-1 RNA Quant Result   Date Value Ref Range Status   05/18/2021 21 (A) HIVND^HIV-1 RNA Not Detected [Copies]/mL Final     Comment:     The ANAT AmpliPrep/ANAT TaqMan HIV-1 test is an FDA-approved in vitro   nucleic acid amplification test for the quantitation of HIV-1 RNA in human   plasma (EDTA  plasma) using the ANAT AmpliPrep instrument for automated viral   nucleic acid extraction and the ANAT TaqMan Analyzer or ANAT TaqMan for   automated Real Time PCR amplification and detection of the viral nucleic acid   target.  Titer results are reported in copies/ml. This assay is intended for use in   conjunction with clinical presentation and other laboratory markers of disease   prognosis and for use as an aid in assessing viral response to antiretroviral   treatment as measured by changes in plasma HIV-1 RNA levels. This test should   not be used as a donor screening test to confirm the presence of HIV-1   infection.       HIV-1 RNA copies/mL   Date Value Ref Range Status   12/18/2024 Not Detected Not Detected Copies/mL Final   04/19/2023 Not Detected Not Detected Copies/mL Final     HIV-1 RNA Copies/mL, Instrument   Date Value Ref Range Status   11/05/2024 574 (H) Not Detected copies/mL Final   04/12/2022 38 (H) <1 copies/mL Final     HIV RNA QT Log   Date Value Ref Range Status   05/18/2021 1.3 (H) <1.3 [Log_copies]/mL Final     HIV-1 log   Date Value Ref Range Status   11/05/2024 2.8  Final   04/12/2022 1.6  Final       Lab Results   Component Value Date    WBC 7.1 08/24/2021    WBC 7.1 05/18/2021     Lab Results   Component Value Date    RBC 5.26 08/24/2021    RBC 5.14 05/18/2021     Lab Results   Component Value Date    HGB 17.1 08/24/2021    HGB 16.4 05/18/2021     Lab Results   Component Value Date    HCT 50.0 08/24/2021    HCT 48.3 05/18/2021     No components found for: MCT  Lab Results   Component Value Date    MCV 95 08/24/2021    MCV 94 05/18/2021     Lab Results   Component Value Date    MCH 32.5 08/24/2021    MCH 31.9 05/18/2021     Lab Results   Component Value Date    MCHC 34.2 08/24/2021    MCHC 34.0 05/18/2021     Lab Results   Component Value Date    RDW 12.4 08/24/2021    RDW 12.8 05/18/2021     Lab Results   Component Value Date     08/24/2021     05/18/2021       Last  Comprehensive Metabolic Panel:  Sodium   Date Value Ref Range Status   11/05/2024 133 (L) 135 - 145 mmol/L Final   05/18/2021 133 133 - 144 mmol/L Final     Potassium   Date Value Ref Range Status   11/05/2024 4.2 3.4 - 5.3 mmol/L Final   10/04/2022 4.3 3.4 - 5.3 mmol/L Final   05/18/2021 4.4 3.4 - 5.3 mmol/L Final     Chloride   Date Value Ref Range Status   11/05/2024 98 98 - 107 mmol/L Final   10/04/2022 101 94 - 109 mmol/L Final   05/18/2021 100 94 - 109 mmol/L Final     Carbon Dioxide   Date Value Ref Range Status   05/18/2021 27 20 - 32 mmol/L Final     Carbon Dioxide (CO2)   Date Value Ref Range Status   11/05/2024 24 22 - 29 mmol/L Final   10/04/2022 28 20 - 32 mmol/L Final     Anion Gap   Date Value Ref Range Status   11/05/2024 11 7 - 15 mmol/L Final   10/04/2022 5 3 - 14 mmol/L Final   05/18/2021 6 3 - 14 mmol/L Final     Glucose   Date Value Ref Range Status   11/05/2024 91 70 - 99 mg/dL Final   10/04/2022 89 70 - 99 mg/dL Final   05/18/2021 89 70 - 99 mg/dL Final     Urea Nitrogen   Date Value Ref Range Status   11/05/2024 12.1 8.0 - 23.0 mg/dL Final   10/04/2022 13 7 - 30 mg/dL Final   05/18/2021 12 7 - 30 mg/dL Final     Creatinine   Date Value Ref Range Status   11/05/2024 0.92 0.67 - 1.17 mg/dL Final   05/18/2021 0.88 0.66 - 1.25 mg/dL Final     GFR Estimate   Date Value Ref Range Status   11/05/2024 87 >60 mL/min/1.73m2 Final     Comment:     eGFR calculated using 2021 CKD-EPI equation.   05/18/2021 86 >60 mL/min/[1.73_m2] Final     Comment:     Non  GFR Calc  Starting 12/18/2018, serum creatinine based estimated GFR (eGFR) will be   calculated using the Chronic Kidney Disease Epidemiology Collaboration   (CKD-EPI) equation.       Calcium   Date Value Ref Range Status   11/05/2024 9.0 8.8 - 10.4 mg/dL Final     Comment:     Reference intervals for this test were updated on 7/16/2024 to reflect our healthy population more accurately. There may be differences in the flagging of prior  results with similar values performed with this method. Those prior results can be interpreted in the context of the updated reference intervals.   05/18/2021 9.3 8.5 - 10.1 mg/dL Final     Bilirubin Total   Date Value Ref Range Status   11/05/2024 0.8 <=1.2 mg/dL Final   05/18/2021 0.6 0.2 - 1.3 mg/dL Final     Alkaline Phosphatase   Date Value Ref Range Status   11/05/2024 78 40 - 150 U/L Final   05/18/2021 88 40 - 150 U/L Final     ALT   Date Value Ref Range Status   11/05/2024 16 0 - 70 U/L Final   05/18/2021 20 0 - 70 U/L Final     AST   Date Value Ref Range Status   11/05/2024 28 0 - 45 U/L Final   05/18/2021 21 0 - 45 U/L Final       Assessment and Plan:  Zac Rosenthal is a 74 year old male who is here for followup on HIV treatment.    #HIV: Was well controlled on Stribild. No missed doses. Had some blips in viral load in 2/2021 to 188, since then undetectable in May 2021, 8/2021,10/22, 10/23. CD4 1393 in 10/23. In 5/2024 had another Viral load with a Blip to 89, still considered undetectable (<200), planned to monitor every 3 months. Labs 11/5/2024 at 574. CD4 reassuringly 1017. Ordered phenosense add on at the time but appeared  to not be done. At last visit in 11/2024 we reordered this - Resistance test returned canceled as it showed virus level was too low to do resistance in their test, as we had anticipated. This was reassuring.     He is 100% compliant, uses a pill box. Had a new med - amlodipine - stribild increases its level but no effect on Stribild levels on my review. We created an MTM referral. No new food/multivitamins. We planned to repeat in our lab (some reported lab issues lately - he got at [facility] lab) 4 weeks from last, and  closely follow up. Planned to consider proviral DNA as next step is remains an issue. He was not sexually active, discussed no longer undetectable and advised condom use w sexual activity. December viral load(at our lab) was excellently undetectable. At this point no  concerns - we can go to 6 monthly VL checks and 1 year routine labs as usual.    #Leucocytosis.10/23 labs with elevated WBC. No localizing symptoms on ROS. Subsequent checks normal    #HTN: The patient follows up with his PCP to address his elevated BP. On lisinopril and new amlodipine. Home Bps have been always <130, DBP 60s(115/70s at home). Today at 140/80s too. 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 Subsequent follow-up again in Feb 2019 showed no dysplasia.  At follow up with CRS in 9/2021: No evidence of high grade dysplasia. 8/22, LGSIL(AIN1), 3/23 LGSIL 9/23 HGSIL, 12/24 CRS HRA negative for Intraepithelial Lesion or Malignancy (NILM), CRS following    #Colonic tubular adenoma polyp in April 2017.  Colonoscopy recommended after 5 years from then. Repeat colonoscopy w 2 tubular adenomas in 7/22. Follow up per GI    #Low sodium, resolved.  On review, remained a bit low and stable since at least 2018, determination was he likely just has a chronically low level,plan to watch. Had diarrhea now improved after adjustign diet, NA now normal. 129 in 5.2024, advised primary follow up, 131 and 133 snce.     #Chronic dermatitis - He uses fluconazole and keotconazole as needed, 2-3 times per year when fungal dermatitis near buttocks flare up, per primary. Denies active issues currently      Preventative Medicine  Cardiovascular Fidel:                        Lipids: normal in 4/23, primary plans to follow per pt                       Blood Pressure: as above  Cancer Screening:                       Colon: As above, follow up per gi                       Anal: CRS following  Immunizations:                       Hepatitis A: 1/21/2000, 6/25/1999                       Hepatitis B: 7/13/2012, 1/21/2000, 7/28/1999, 6/25/1999                        Influenza: Done 2024                       Pneumovax/Prevnar  PPSV 23 11/20/2014, 2/6/2009, 1/21/2000                       PCV 13 8/18/2017, 7/24/2013                          - discussed PCV20 but based on shared decision making decided against, immunizations can be considered complete for pneumococcus                       Tdap: 2008, 8/2017                       Td 1998                       Meningococcus: 9/21/2015, 7/23/2015                       Covid: 3 doses done, booster done 2024  Bone Health: No screening indicated at this time  Renal Health: Cr: normal  Sexually Transmitted Infection Risk and Screening:                       Gonorrhea and Chlamydia: declined today                       Syphilis: declined today        Recs  - VL in 6 months  - Labs in 1 year, follow up in 1 year - no lipids, but CD4, Vl, CBC w diff, cmp  - Continue Stribild     Total time on day of visit including chart review, visit, counseling, documentation and coordination of care: 36 minutes                Carlos Emerson   of Medicine  Division of Infectious Diseases

## 2025-01-27 NOTE — NURSING NOTE
"Chief Complaint   Patient presents with    RECHECK     Follow up with B20     BP (!) 149/81   Pulse 68   Temp 97.8  F (36.6  C) (Oral)   Ht 1.753 m (5' 9\")   Wt 94.3 kg (207 lb 14.4 oz)   BMI 30.70 kg/m      Belkis Salamanca, Lead CMA  1/27/2025 1:45 PM    "

## 2025-03-09 ENCOUNTER — HEALTH MAINTENANCE LETTER (OUTPATIENT)
Age: 75
End: 2025-03-09

## 2025-04-03 ENCOUNTER — LAB (OUTPATIENT)
Dept: LAB | Facility: CLINIC | Age: 75
End: 2025-04-03
Payer: COMMERCIAL

## 2025-04-03 DIAGNOSIS — I10 ESSENTIAL HYPERTENSION: ICD-10-CM

## 2025-04-03 DIAGNOSIS — Z13.220 SCREENING CHOLESTEROL LEVEL: ICD-10-CM

## 2025-04-03 LAB
ANION GAP SERPL CALCULATED.3IONS-SCNC: 9 MMOL/L (ref 7–15)
BUN SERPL-MCNC: 10.4 MG/DL (ref 8–23)
CALCIUM SERPL-MCNC: 9.4 MG/DL (ref 8.8–10.4)
CHLORIDE SERPL-SCNC: 95 MMOL/L (ref 98–107)
CHOLEST SERPL-MCNC: 128 MG/DL
CREAT SERPL-MCNC: 0.87 MG/DL (ref 0.67–1.17)
EGFRCR SERPLBLD CKD-EPI 2021: 90 ML/MIN/1.73M2
FASTING STATUS PATIENT QL REPORTED: YES
FASTING STATUS PATIENT QL REPORTED: YES
GLUCOSE SERPL-MCNC: 89 MG/DL (ref 70–99)
HCO3 SERPL-SCNC: 28 MMOL/L (ref 22–29)
HDLC SERPL-MCNC: 50 MG/DL
LDLC SERPL CALC-MCNC: 62 MG/DL
NONHDLC SERPL-MCNC: 78 MG/DL
POTASSIUM SERPL-SCNC: 4.5 MMOL/L (ref 3.4–5.3)
SODIUM SERPL-SCNC: 132 MMOL/L (ref 135–145)
TRIGL SERPL-MCNC: 79 MG/DL

## 2025-04-23 ENCOUNTER — MYC MEDICAL ADVICE (OUTPATIENT)
Dept: INFECTIOUS DISEASES | Facility: CLINIC | Age: 75
End: 2025-04-23
Payer: COMMERCIAL

## 2025-04-23 DIAGNOSIS — Z21 ASYMPTOMATIC HUMAN IMMUNODEFICIENCY VIRUS (HIV) INFECTION STATUS (H): Primary | ICD-10-CM

## 2025-04-23 RX ORDER — ELVITEGRAVIR, COBICISTAT, EMTRICITABINE, AND TENOFOVIR DISOPROXIL FUMARATE 150; 150; 200; 300 MG/1; MG/1; MG/1; MG/1
1 TABLET, FILM COATED ORAL
Qty: 30 TABLET | Refills: 5 | Status: SHIPPED | OUTPATIENT
Start: 2025-04-23

## 2025-04-23 NOTE — TELEPHONE ENCOUNTER
"Medication Refill                                                     Refill request receive for:   STRIBILD [phone] mg tab          Sig: Take 1 tablet by mouth daily with food.    Disp: 30 tablet    Refills: 5     HIV-Q- Undetected 12/8/24    Last refill: 11/20/24    Last Office Visit 1/27/2025    Future appt scheduled?  No    POC for medication if indicated from last note including duration of treatment if applicable:   \"HIV: Was well controlled on Stribild. No missed doses. Had some blips in viral load in 2/2021 to 188, since then undetectable in May 2021, 8/2021,10/22, 10/23. CD4 1393 in 10/23. In 5/2024 had another Viral load with a Blip to 89, still considered undetectable (<200), planned to monitor every 3 months. Labs 11/5/2024 at 574. CD4 reassuringly 1017. Ordered phenosense add on at the time but appeared  to not be done. At last visit in 11/2024 we reordered this - Resistance test returned canceled as it showed virus level was too low to do resistance in their test, as we had anticipated.\"      RECENT LABS/VITALS                                                        Lab Results   Component Value Date    AST 28 11/05/2024    AST 21 05/18/2021     Lab Results   Component Value Date    ALT 16 11/05/2024    ALT 20 05/18/2021     Creatinine   Date Value Ref Range Status   04/03/2025 0.87 0.67 - 1.17 mg/dL Final   05/18/2021 0.88 0.66 - 1.25 mg/dL Final   ]  Alkaline Phosphatase   Date/Time Value Ref Range Status   11/05/2024 02:23 PM 78 40 - 150 U/L Final   05/18/2021 02:49 PM 88 40 - 150 U/L Final     No results found for: \"LABAPCBCDIFF\"                    "

## 2025-04-23 NOTE — TELEPHONE ENCOUNTER
Received message from patient requesting monthly refills be sent to [facility] at [address] [phone]

## 2025-05-19 ENCOUNTER — VIRTUAL VISIT (OUTPATIENT)
Dept: FAMILY MEDICINE | Facility: CLINIC | Age: 75
End: 2025-05-19
Payer: COMMERCIAL

## 2025-05-19 DIAGNOSIS — B36.9 DERMATITIS FUNGAL: ICD-10-CM

## 2025-05-19 DIAGNOSIS — B20 HUMAN IMMUNODEFICIENCY VIRUS (HIV) DISEASE (H): ICD-10-CM

## 2025-05-19 DIAGNOSIS — I10 ESSENTIAL HYPERTENSION: Primary | ICD-10-CM

## 2025-05-19 PROCEDURE — 1126F AMNT PAIN NOTED NONE PRSNT: CPT | Mod: 95 | Performed by: FAMILY MEDICINE

## 2025-05-19 PROCEDURE — 98006 SYNCH AUDIO-VIDEO EST MOD 30: CPT | Performed by: FAMILY MEDICINE

## 2025-05-19 RX ORDER — LOSARTAN POTASSIUM 25 MG/1
25 TABLET ORAL DAILY
Qty: 90 TABLET | Refills: 3 | Status: SHIPPED | OUTPATIENT
Start: 2025-05-19

## 2025-05-19 RX ORDER — AMLODIPINE BESYLATE 5 MG/1
5 TABLET ORAL DAILY
Qty: 90 TABLET | Refills: 3 | Status: SHIPPED | OUTPATIENT
Start: 2025-05-19

## 2025-05-19 NOTE — PROGRESS NOTES
Jorge Alberto is a 74 year old who is being evaluated via a billable video visit.    How would you like to obtain your AVS? MyChart  If the video visit is dropped, the invitation should be resent by: Text to cell phone: [phone]  Will anyone else be joining your video visit? No      Assessment & Plan     Essential hypertension  I recommended he continue taking amlodipine at 5 mg dose which does not seem to cause all of the swelling side effects that the 10 mg dose does.  Since his systolic blood pressure is still running a little on the high side we decided to add losartan 25 mg daily.  He is going to continue monitoring his BP closely at home.  He is also planning to come in to have labs rechecked in the next few weeks.  - losartan (COZAAR) 25 MG tablet; Take 1 tablet (25 mg) by mouth daily.  - amLODIPine (NORVASC) 5 MG tablet; Take 1 tablet (5 mg) by mouth daily.    Human immunodeficiency virus (HIV) disease (H)  This issue is stable on Stribild. He is following with infectious disease and is viral load was negative on the most recent check.     Dermatitis fungal  Stable with antifungals as needed.       The longitudinal plan of care for the diagnosis(es)/condition(s) as documented were addressed during this visit. Due to the added complexity in care, I will continue to support Jorge Alberto in the subsequent management and with ongoing continuity of care.          Subjective   Jorge Alberto is a 74 year old, presenting for the following health issues:  Recheck Medication (amlodipine) and Hypertension        5/19/2025     8:53 AM   Additional Questions   Roomed by CHARLES Chaparro   Accompanied by N/A         5/19/2025     8:53 AM   Patient Reported Additional Medications   Patient reports taking the following new medications amlodipine 5 mg once daily       Video Start Time: 10:35 AM    History of Present Illness       Hypertension: He presents for follow up of hypertension.  He does check blood pressure  regularly outside of the clinic.  "Outpatient blood pressures have not been over 140/90. He does not follow a low salt diet.     He eats 2-3 servings of fruits and vegetables daily.He consumes 2 sweetened beverage(s) daily.He exercises with enough effort to increase his heart rate 30 to 60 minutes per day.  He exercises with enough effort to increase his heart rate 5 days per week.   He is taking medications regularly.      Pt reports diastolic BP is \"consistently in the 60's\" and systolic BP is \"increasing to 140's\". Pt reports decreased amlodipine to 5 mg once daily. Pt verbalized would like to stop taking amlodipine and find alternative.     He has a medical history significant for hypertension, HIV, fungal dermatitis, hyponatremia and anal dysplasia.  He gets labs checked every 6 months through his infectious disease specialist. His HIV viral load was undetectable on the most recent check. He is doing well on Stribild.             Review of Systems  Constitutional, HEENT, cardiovascular, pulmonary, GI, , musculoskeletal, neuro, skin, endocrine and psych systems are negative, except as otherwise noted.      Objective    Vitals - Patient Reported  Systolic (Patient Reported): 133  Diastolic (Patient Reported): 67  Pain Score: No Pain (0)      Vitals:  No vitals were obtained today due to virtual visit.    Physical Exam   GENERAL: alert and no distress  EYES: Eyes grossly normal to inspection.  No discharge or erythema, or obvious scleral/conjunctival abnormalities.  RESP: No audible wheeze, cough, or visible cyanosis.    SKIN: Visible skin clear. No significant rash, abnormal pigmentation or lesions.  NEURO: Cranial nerves grossly intact.  Mentation and speech appropriate for age.  PSYCH: Appropriate affect, tone, and pace of words          Video-Visit Details    Type of service:  Video Visit   Video End Time:10:56 AM  Originating Location (pt. Location): Home    Distant Location (provider location):  On-site  Platform used for Video Visit: " Bakari  Signed Electronically by: Alex Manzo, DO

## 2025-07-24 ENCOUNTER — LAB (OUTPATIENT)
Dept: LAB | Facility: CLINIC | Age: 75
End: 2025-07-24
Payer: COMMERCIAL

## 2025-07-24 DIAGNOSIS — I10 ESSENTIAL HYPERTENSION: ICD-10-CM

## 2025-07-24 LAB
ANION GAP SERPL CALCULATED.3IONS-SCNC: 9 MMOL/L (ref 7–15)
BUN SERPL-MCNC: 11.5 MG/DL (ref 8–23)
CALCIUM SERPL-MCNC: 9.4 MG/DL (ref 8.8–10.4)
CHLORIDE SERPL-SCNC: 101 MMOL/L (ref 98–107)
CREAT SERPL-MCNC: 0.92 MG/DL (ref 0.67–1.17)
EGFRCR SERPLBLD CKD-EPI 2021: 87 ML/MIN/1.73M2
GLUCOSE SERPL-MCNC: 93 MG/DL (ref 70–99)
HCO3 SERPL-SCNC: 28 MMOL/L (ref 22–29)
POTASSIUM SERPL-SCNC: 4.2 MMOL/L (ref 3.4–5.3)
SODIUM SERPL-SCNC: 138 MMOL/L (ref 135–145)

## 2025-07-28 ENCOUNTER — TELEPHONE (OUTPATIENT)
Dept: INFECTIOUS DISEASES | Facility: CLINIC | Age: 75
End: 2025-07-28

## 2025-07-28 ENCOUNTER — LAB (OUTPATIENT)
Dept: LAB | Facility: CLINIC | Age: 75
End: 2025-07-28
Attending: STUDENT IN AN ORGANIZED HEALTH CARE EDUCATION/TRAINING PROGRAM
Payer: COMMERCIAL

## 2025-07-28 DIAGNOSIS — Z21 HUMAN IMMUNODEFICIENCY VIRUS I INFECTION (H): ICD-10-CM

## 2025-07-28 PROCEDURE — 36415 COLL VENOUS BLD VENIPUNCTURE: CPT | Performed by: PATHOLOGY

## 2025-07-28 PROCEDURE — 99000 SPECIMEN HANDLING OFFICE-LAB: CPT | Performed by: PATHOLOGY

## 2025-07-28 PROCEDURE — 87536 HIV-1 QUANT&REVRSE TRNSCRPJ: CPT | Performed by: STUDENT IN AN ORGANIZED HEALTH CARE EDUCATION/TRAINING PROGRAM

## 2025-07-28 NOTE — TELEPHONE ENCOUNTER
EP called 7/28 to sched a 1 year follow up with Dr. Emerson in 1/2026 per checkout notes from 1/27.

## 2025-07-29 LAB — HIV1 RNA # PLAS NAA DL=20: NOT DETECTED COPIES/ML

## (undated) DEVICE — TUBING SUCTION 12"X1/4" N612

## (undated) DEVICE — SOL WATER IRRIG 500ML BOTTLE 2F7113

## (undated) DEVICE — SPECIMEN CONTAINER 3OZ W/FORMALIN 59901

## (undated) DEVICE — SUCTION MANIFOLD NEPTUNE 2 SYS 1 PORT 702-025-000

## (undated) DEVICE — GOWN IMPERVIOUS 2XL BLUE

## (undated) DEVICE — ENDO SNARE POLYPECTOMY OVAL 10MM LOOP SD-240U-10

## (undated) DEVICE — SNARE CAPIVATOR ROUND COLD SNR BX10 M00561101

## (undated) DEVICE — ENDO TRAP POLYP E-TRAP 00711099

## (undated) DEVICE — ENDO FORCEP BX CAPTURA PRO SPIKE G50696

## (undated) DEVICE — KIT ENDO TURNOVER/PROCEDURE CARRY-ON 101822

## (undated) DEVICE — ENDO FORCEP SPIKED SERRATED SHAFT JUMBO 239CM G56998

## (undated) RX ORDER — ACETIC ACID 5 %
LIQUID (ML) MISCELLANEOUS
Status: DISPENSED
Start: 2018-08-09

## (undated) RX ORDER — FERRIC SUBSULFATE 0.21 G/G
LIQUID TOPICAL
Status: DISPENSED
Start: 2019-09-19

## (undated) RX ORDER — LIDOCAINE HYDROCHLORIDE AND EPINEPHRINE 10; 10 MG/ML; UG/ML
INJECTION, SOLUTION INFILTRATION; PERINEURAL
Status: DISPENSED
Start: 2017-04-13

## (undated) RX ORDER — LIDOCAINE HYDROCHLORIDE AND EPINEPHRINE 10; 10 MG/ML; UG/ML
INJECTION, SOLUTION INFILTRATION; PERINEURAL
Status: DISPENSED
Start: 2017-10-12

## (undated) RX ORDER — FERRIC SUBSULFATE 0.21 G/G
LIQUID TOPICAL
Status: DISPENSED
Start: 2017-04-13

## (undated) RX ORDER — ACETIC ACID 5 %
LIQUID (ML) MISCELLANEOUS
Status: DISPENSED
Start: 2021-09-09

## (undated) RX ORDER — FERRIC SUBSULFATE 0.21 G/G
LIQUID TOPICAL
Status: DISPENSED
Start: 2020-03-12

## (undated) RX ORDER — LIDOCAINE HYDROCHLORIDE AND EPINEPHRINE 10; 10 MG/ML; UG/ML
INJECTION, SOLUTION INFILTRATION; PERINEURAL
Status: DISPENSED
Start: 2021-04-15

## (undated) RX ORDER — FERRIC SUBSULFATE 0.21 G/G
LIQUID TOPICAL
Status: DISPENSED
Start: 2022-05-19

## (undated) RX ORDER — ACETIC ACID 5 %
LIQUID (ML) MISCELLANEOUS
Status: DISPENSED
Start: 2021-04-15

## (undated) RX ORDER — ACETIC ACID 5 %
LIQUID (ML) MISCELLANEOUS
Status: DISPENSED
Start: 2023-12-01

## (undated) RX ORDER — FERRIC SUBSULFATE 0.21 G/G
LIQUID TOPICAL
Status: DISPENSED
Start: 2021-09-09

## (undated) RX ORDER — FERRIC SUBSULFATE 0.21 G/G
LIQUID TOPICAL
Status: DISPENSED
Start: 2019-02-14

## (undated) RX ORDER — LIDOCAINE HYDROCHLORIDE AND EPINEPHRINE 10; 10 MG/ML; UG/ML
INJECTION, SOLUTION INFILTRATION; PERINEURAL
Status: DISPENSED
Start: 2022-08-26

## (undated) RX ORDER — FERRIC SUBSULFATE 0.21 G/G
LIQUID TOPICAL
Status: DISPENSED
Start: 2023-09-29

## (undated) RX ORDER — ACETIC ACID 5 %
LIQUID (ML) MISCELLANEOUS
Status: DISPENSED
Start: 2022-08-26

## (undated) RX ORDER — ACETIC ACID 5 %
LIQUID (ML) MISCELLANEOUS
Status: DISPENSED
Start: 2017-04-13

## (undated) RX ORDER — LIDOCAINE HYDROCHLORIDE 20 MG/ML
JELLY TOPICAL
Status: DISPENSED
Start: 2023-03-10

## (undated) RX ORDER — ACETIC ACID 5 %
LIQUID (ML) MISCELLANEOUS
Status: DISPENSED
Start: 2023-03-10

## (undated) RX ORDER — ACETIC ACID 5 %
LIQUID (ML) MISCELLANEOUS
Status: DISPENSED
Start: 2023-09-29

## (undated) RX ORDER — ACETIC ACID 5 %
LIQUID (ML) MISCELLANEOUS
Status: DISPENSED
Start: 2022-05-19

## (undated) RX ORDER — LIDOCAINE HYDROCHLORIDE AND EPINEPHRINE 10; 10 MG/ML; UG/ML
INJECTION, SOLUTION INFILTRATION; PERINEURAL
Status: DISPENSED
Start: 2019-09-19

## (undated) RX ORDER — FERRIC SUBSULFATE 0.21 G/G
LIQUID TOPICAL
Status: DISPENSED
Start: 2018-08-09

## (undated) RX ORDER — FERRIC SUBSULFATE 0.21 G/G
LIQUID TOPICAL
Status: DISPENSED
Start: 2021-04-15

## (undated) RX ORDER — LIDOCAINE HYDROCHLORIDE AND EPINEPHRINE 10; 10 MG/ML; UG/ML
INJECTION, SOLUTION INFILTRATION; PERINEURAL
Status: DISPENSED
Start: 2021-09-09

## (undated) RX ORDER — ACETIC ACID 5 %
LIQUID (ML) MISCELLANEOUS
Status: DISPENSED
Start: 2018-05-03

## (undated) RX ORDER — ACETIC ACID 5 %
LIQUID (ML) MISCELLANEOUS
Status: DISPENSED
Start: 2019-09-19

## (undated) RX ORDER — LIDOCAINE HYDROCHLORIDE 20 MG/ML
JELLY TOPICAL
Status: DISPENSED
Start: 2023-09-29

## (undated) RX ORDER — ACETIC ACID 5 %
LIQUID (ML) MISCELLANEOUS
Status: DISPENSED
Start: 2020-03-12

## (undated) RX ORDER — LIDOCAINE HYDROCHLORIDE AND EPINEPHRINE 10; 10 MG/ML; UG/ML
INJECTION, SOLUTION INFILTRATION; PERINEURAL
Status: DISPENSED
Start: 2020-03-12

## (undated) RX ORDER — LIDOCAINE HYDROCHLORIDE AND EPINEPHRINE 10; 10 MG/ML; UG/ML
INJECTION, SOLUTION INFILTRATION; PERINEURAL
Status: DISPENSED
Start: 2019-02-14

## (undated) RX ORDER — ACETIC ACID 5 %
LIQUID (ML) MISCELLANEOUS
Status: DISPENSED
Start: 2017-10-12

## (undated) RX ORDER — LIDOCAINE HYDROCHLORIDE 20 MG/ML
JELLY TOPICAL
Status: DISPENSED
Start: 2023-12-01

## (undated) RX ORDER — FERRIC SUBSULFATE 0.21 G/G
LIQUID TOPICAL
Status: DISPENSED
Start: 2017-10-12

## (undated) RX ORDER — LIDOCAINE HYDROCHLORIDE AND EPINEPHRINE 10; 10 MG/ML; UG/ML
INJECTION, SOLUTION INFILTRATION; PERINEURAL
Status: DISPENSED
Start: 2018-05-03

## (undated) RX ORDER — ACETIC ACID 5 %
LIQUID (ML) MISCELLANEOUS
Status: DISPENSED
Start: 2019-02-14

## (undated) RX ORDER — FERRIC SUBSULFATE 0.21 G/G
LIQUID TOPICAL
Status: DISPENSED
Start: 2018-05-03

## (undated) RX ORDER — LIDOCAINE HYDROCHLORIDE AND EPINEPHRINE 15; 5 MG/ML; UG/ML
INJECTION, SOLUTION EPIDURAL
Status: DISPENSED
Start: 2017-10-12